# Patient Record
Sex: FEMALE | Race: BLACK OR AFRICAN AMERICAN | Employment: FULL TIME | URBAN - METROPOLITAN AREA
[De-identification: names, ages, dates, MRNs, and addresses within clinical notes are randomized per-mention and may not be internally consistent; named-entity substitution may affect disease eponyms.]

---

## 2017-03-15 NOTE — PATIENT DISCUSSION
(H16.222) Keratoconjunct sicca, not specified as Sjogren's, left eye - Assesment : Examination revealed Dry Eye Syndrome WITH IRRITATION DUE TO SHAMPOO GETTING IN EYE TODAY - Plan : SYSTANE BALANCE OS TID / PRN

## 2017-03-15 NOTE — PATIENT DISCUSSION
(H92.208) Vitreous degeneration, bilateral - Assesment : Examination revealed PVD - Plan : Monitor for changes. Advised patient to call our office with decreased vision or an increase in flashes and/or floaters.

## 2017-03-15 NOTE — PATIENT DISCUSSION
(H35.373) Puckering of macula, bilateral - Assesment : Examination revealed ERM OD>OS. - Plan : Monitor. Advised patient to call our office with decreased vision or increased symptoms.

## 2017-03-15 NOTE — PATIENT DISCUSSION
(H52.856) Regular astigmatism, bilateral - Assesment : Refractive testing reveals astigmatism.  - Plan : OBSERVATION

## 2017-03-15 NOTE — PATIENT DISCUSSION
(H25.13) Age-related nuclear cataract, bilateral - Assesment : Examination revealed cataract. **H/O 8 INCISION RK WITH AK OU. - Plan : Monitor for changes. Advised patient to call our office with decreased vision or increased symptoms.  UPDATE MRX  PATIENT TO CALL IF WISHES TO PROCEED WITH CAT SX BEFORE 1 YEAR FOLLOW UP  1 YEAR EXAM

## 2017-12-01 ENCOUNTER — OFFICE VISIT (OUTPATIENT)
Dept: ONCOLOGY | Age: 32
End: 2017-12-01

## 2017-12-01 ENCOUNTER — HOSPITAL ENCOUNTER (OUTPATIENT)
Dept: ONCOLOGY | Age: 32
Discharge: HOME | End: 2017-12-01

## 2017-12-01 VITALS
DIASTOLIC BLOOD PRESSURE: 62 MMHG | WEIGHT: 144.4 LBS | BODY MASS INDEX: 27.28 KG/M2 | HEART RATE: 59 BPM | SYSTOLIC BLOOD PRESSURE: 100 MMHG | TEMPERATURE: 98.1 F

## 2017-12-01 DIAGNOSIS — D75.1 ERYTHROCYTOSIS: ICD-10-CM

## 2017-12-01 DIAGNOSIS — D72.9 NEUTROPHILIC LEUKOCYTOSIS: ICD-10-CM

## 2017-12-01 DIAGNOSIS — D72.9 NEUTROPHILIC LEUKOCYTOSIS: Primary | ICD-10-CM

## 2017-12-01 LAB
BASO+EOS+MONOS # BLD AUTO: 0.6 K/UL (ref 0–2.3)
BASO+EOS+MONOS # BLD AUTO: 9 % (ref 0.1–17)
DIFFERENTIAL METHOD BLD: ABNORMAL
ERYTHROCYTE [DISTWIDTH] IN BLOOD BY AUTOMATED COUNT: 13.7 % (ref 11.5–14.5)
HCT VFR BLD AUTO: 42 % (ref 36–48)
HGB BLD-MCNC: 12.9 G/DL (ref 12–16)
LYMPHOCYTES # BLD: 2.9 K/UL (ref 1.1–5.9)
LYMPHOCYTES NFR BLD: 43 % (ref 14–44)
MCH RBC QN AUTO: 22.8 PG (ref 25–35)
MCHC RBC AUTO-ENTMCNC: 30.7 G/DL (ref 31–37)
MCV RBC AUTO: 74.2 FL (ref 78–102)
NEUTS SEG # BLD: 3.3 K/UL (ref 1.8–9.5)
NEUTS SEG NFR BLD: 48 % (ref 40–70)
PLATELET # BLD AUTO: 293 K/UL (ref 140–440)
RBC # BLD AUTO: 5.66 M/UL (ref 4.1–5.1)
WBC # BLD AUTO: 6.8 K/UL (ref 4.5–13)

## 2017-12-01 RX ORDER — MELOXICAM 15 MG/1
TABLET ORAL
Refills: 1 | COMMUNITY
Start: 2017-09-18 | End: 2021-02-09

## 2017-12-01 NOTE — PROGRESS NOTES
Hematology/Oncology Consultation Note    Name: Eduardo Christie  Date: 2017  : 1985    PCP: Lonnie Mckeon MD       Ms. Jose George  is a 27 y.o. -American woman who is referred for an evaluation of an elevated RBC count    Subjective:     Chief complaint: Elevated red blood cell count    History of present illness:  Ms. Jose George is a 75-year-old -American woman who states that recently she was seen by her new primary care physician who did blood tests. The CBC revealed that she had an elevated RBC count of 5.68 on 2017. Her WBC count was normal at 8.2 with a hemoglobin of 13 g/dL, hematocrit of 41.5%, and a platelet count of 630,938. On reviewing her clinical record on 10/29/2014 she had an elevated WBC count of 16 and was mildly anemic with a hemoglobin of 11.2, hematocrit of 35.1, and a normal platelet of 775,518. The patient has other complaints of some weakness in her right leg. She is being seen by both neurologist and a rheumatologist for these problems. She is here primarily for assessment of her abnormal RBC count. Past Medical History:   Diagnosis Date    Anemia     Hemorrhoids     Joint pain     Muscle pain        No Known Allergies    Past Surgical History:   Procedure Laterality Date    HX GYN      iud inserted and removed    HX HYSTERECTOMY      HX OTHER SURGICAL  Lap in     HX TUBAL LIGATION         Social History     Social History    Marital status: UNKNOWN     Spouse name: N/A    Number of children: N/A    Years of education: N/A     Occupational History    Not on file.      Social History Main Topics    Smoking status: Never Smoker    Smokeless tobacco: Never Used    Alcohol use No    Drug use: No    Sexual activity: Yes     Partners: Male     Other Topics Concern    Not on file     Social History Narrative       Family History   Problem Relation Age of Onset    Diabetes Mother     Hypertension Mother     Stroke Maternal Grandfather     Breast Cancer Paternal Aunt     Hypertension Father     Depression Father        Current Outpatient Prescriptions   Medication Sig Dispense Refill    meloxicam (MOBIC) 15 mg tablet TAKE 1 TAB BY MOUTH DAILY AS NEEDED FOR PAIN. TAKE WITH FOOD  1    Cholecalciferol, Vitamin D3, (VITAMIN D3) 2,000 unit cap capsule Take  by mouth two (2) times a day.  oxycodone-acetaminophen (PERCOCET) 5-325 mg per tablet Take 1-2 tablets by mouth every six (6) hours as needed for Pain. 40 tablet 0    ibuprofen (MOTRIN) 600 mg tablet Take  by mouth. Indications: PAIN       Review of Systems    General ROS:The patient has no complaints and there is no physical distress evident. Psychological ROS: patient denies having any psychological symptoms such as hallucinations, depression or anxiety. Ophthalmic ROS:the patient denies having any visual impairment or eye discomfort. ENT ROS: there are no abnormalities reported. Allergy and Immunology ROS:the patient denies having any seasonal allergies or allergies to medications other than those already outlined above. Hematological and Lymphatic ROS: the patient denies having any bruising, bleeding or lymphadenopathy. Endocrine ROS: the patient denies having any heat or cold intolerance. There is no history of diabetes or thyroid disorders. Breast ROS: the patient denies having any history of breast mass, nipple discharge, or lumps. Respiratory ROS:the patient denies having any cough, shortness of breath, or dyspnea on exertion. Cardiovascular ROS: there are no complaints of chest pain, palpitations, chest pounding, or dyspnea on exertion. Gastrointestinal ROS: the patient denies having nausea, emesis, diarrhea, constipation, or blood in the stool. Genito-Urinary ROS: the patient denies having urinary urgency, frequency, or dysuria. Musculoskeletal ROS: The patient has some complaints of weakness in the right leg.   Neurological ROS: the patient denies having any numbness, tingling, or neurologic deficits. Dermatological ROS:patient denies having any unexplained rash, skin ulcerations, or hives. Objective:     Visit Vitals    /62 (BP 1 Location: Left arm, BP Patient Position: Sitting)    Pulse (!) 59    Temp 98.1 °F (36.7 °C) (Oral)    Wt 65.5 kg (144 lb 6.4 oz)    LMP 10/06/2014    BMI 27.28 kg/m2        Physical Exam:   Gen. Appearance: the patient is in no acute distress. Skin: There is no evidence of bruise or rash. HEENT: The head is normocephalic and atraumatic. The conjunctiva and sclera are clear. Pupils are equal, round, reactive to light, and accommodation. The extraocular movements are intact. ENT reveals no oral mucosal lesions or ulcerations. Neck: Supple without lymphadenopathy or thyromegaly. Lungs: Clear to auscultation and percussion; there are no wheezes or rhonchi. Heart: Regular rate and rhythm; there are no murmurs, gallops, or rubs. Abdomen: Bowel sounds are present and normal.  There is no guarding, tenderness, or hepatosplenomegaly. Extremities: There is no clubbing, cyanosis, or edema. Neurologic: There are no focal neurologic deficits. Lymphatics: There is no palpable peripheral lymphadenopathy. Lab data: The CBC dated 7/21/2017 showed a WBC count of 8.2, hemoglobin 13 g/dL, hematocrit 41.5%, and her platelet count was 036,122. The CBC dated 10/29/2014 showed a WBC count of 16, hemoglobin 11.2 g/dL, hematocrit 35.1%, and the platelet count was 932,162. Assessment:   Erythrocytosis: Have explained to the patient that she likely has a reactive erythrocytosis versus early evidence of an evolving myeloproliferative disorder primarily affecting erythrocyte production. Neutrophilic leukocytosis: The CBC dated October 29, 2014 showed that she had an unexplained neutrophilic leukocytosis. As of July 2017 her WBC count was normal.      Plan:   Erythrocytosis:  At this time I will order a comprehensive metabolic panel, CBC, and JA K2 mutation analysis to assess for any evidence of a possible evolving myeloproliferative disorder. Neutrophilic leukocytosis: The patient does have an antecedent history of elevated leukocytes on multiple occasions. Flow cytometry will be obtained to rule out any evidence of a slowly evolving immunophenotypic abnormality which could potentially affect leukocyte populations and erythroid populations. Follow-up in 2 weeks to review test results and to discuss options of management. Orders Placed This Encounter    METABOLIC PANEL, COMPREHENSIVE     Standing Status:   Future     Number of Occurrences:   1     Standing Expiration Date:   12/2/2018    IMMUNOPHENOTYPING PROFILE     Standing Status:   Future     Number of Occurrences:   1     Standing Expiration Date:   12/2/2018     Order Specific Question:   Specimen type     Answer:   Blood [2]    JAK2 MUTATION ANALYSIS     Standing Status:   Future     Number of Occurrences:   1     Standing Expiration Date:   12/2/2018    meloxicam (MOBIC) 15 mg tablet     Sig: TAKE 1 TAB BY MOUTH DAILY AS NEEDED FOR PAIN. TAKE WITH FOOD     Refill:  1906 Yaniv Hopkins MD  12/1/2017      Please note: This document has been produced using voice recognition software. Unrecognized errors in transcription may be present.

## 2017-12-01 NOTE — PATIENT INSTRUCTIONS
Complete Blood Count (CBC): About This Test  What is it? A complete blood count (CBC) is a blood test that gives important information about your blood cells, especially red blood cells, white blood cells, and platelets. Why is this test done? A CBC may be done as part of a regular physical exam. There are many other reasons that a doctor may want this blood test, including to:  · Find the cause of symptoms such as fatigue, weakness, fever, bruising, or weight loss. · Find anemia or an infection. · See how much blood has been lost if there is bleeding. · Diagnose diseases of the blood, such as leukemia or polycythemia. How can you prepare for the test?  You do not need to do anything before having this test.  What happens during the test?  The health professional taking a sample of your blood will:  · Wrap an elastic band around your upper arm. This makes the veins below the band larger so it is easier to put a needle into the vein. · Clean the needle site with alcohol. · Put the needle into the vein. · Attach a tube to the needle to fill it with blood. · Remove the band from your arm when enough blood is collected. · Put a gauze pad or cotton ball over the needle site as the needle is removed. · Put pressure on the site and then put on a bandage. If this blood test is done on a baby, a heel stick may be done instead of a blood draw from a vein. What happens after the test?  · You will probably be able to go home right away. · You can go back to your usual activities right away. Follow-up care is a key part of your treatment and safety. Be sure to make and go to all appointments, and call your doctor if you are having problems. It's also a good idea to keep a list of the medicines you take. Ask your doctor when you can expect to have your test results. Where can you learn more? Go to http://maxi-dominic.info/.   Enter K536 in the search box to learn more about \"Complete Blood Count (CBC): About This Test.\"  Current as of: October 14, 2016  Content Version: 11.4  © 8302-2239 Healthwise, Incorporated. Care instructions adapted under license by [employer] (which disclaims liability or warranty for this information). If you have questions about a medical condition or this instruction, always ask your healthcare professional. [place] [address] any warranty or liability for your use of this information.

## 2017-12-01 NOTE — MR AVS SNAPSHOT
Visit Information Date & Time Provider Department Dept. Phone Encounter #  
 12/1/2017  9:30 AM Janie London MD [facility] Office [phone] [id number] Follow-up Instructions Return in about 2 weeks (around 12/15/2017). Your Appointments 12/14/2017 11:45 AM  
Office Visit with Janie London MD  
[facility] ([facility]) Appt Note: 2 wk fu  
 [address] [facility] [address] [address] [phone] [facility] [address] [address] Upcoming Health Maintenance Date Due DTaP/Tdap/Td series (1 - Tdap) 2/12/2004 PAP AKA CERVICAL CYTOLOGY 8/17/2014 Influenza Age 5 to Adult 8/1/2017 Allergies as of 12/1/2017  Review Complete On: 12/1/2017 By: Aj Arguelles  
 No Known Allergies Current Immunizations  Never Reviewed No immunizations on file. Not reviewed this visit You Were Diagnosed With   
  
 Codes Comments Neutrophilic leukocytosis    -  Primary ICD-10-CM: D72.9 ICD-9-CM: 288.8 Erythrocytosis     ICD-10-CM: D75.1 ICD-9-CM: 289. 0 Vitals BP Pulse Temp Weight(growth percentile) LMP BMI  
 100/62 (BP 1 Location: Left arm, BP Patient Position: Sitting) (!) 59 98.1 °F (36.7 °C) (Oral) 144 lb 6.4 oz (65.5 kg) 10/06/2014 27.28 kg/m2 OB Status Smoking Status Hysterectomy Never Smoker BMI and BSA Data Body Mass Index Body Surface Area  
 27.28 kg/m 2 1.68 m 2 Preferred Pharmacy Pharmacy Name Phone [id number], [address] [facility] [phone] Your Updated Medication List  
  
   
This list is accurate as of: 12/1/17 10:23 AM.  Always use your most recent med list.  
  
  
  
  
 Cholecalciferol (Vitamin D3) 2,000 unit Cap capsule Commonly known as:  VITAMIN D3 Take  by mouth two (2) times a day. ibuprofen 600 mg tablet Commonly known as:  MOTRIN  
 Take  by mouth. Indications: PAIN  
  
 meloxicam 15 mg tablet Commonly known as:  MOBIC  
TAKE 1 TAB BY MOUTH DAILY AS NEEDED FOR PAIN. TAKE WITH FOOD  
  
 oxyCODONE-acetaminophen 5-325 mg per tablet Commonly known as:  PERCOCET Take 1-2 tablets by mouth every six (6) hours as needed for Pain. Follow-up Instructions Return in about 2 weeks (around 12/15/2017). To-Do List   
 12/01/2017 Lab:  IMMUNOPHENOTYPING PROFILE   
  
 12/01/2017 Lab:  JAK2 MUTATION ANALYSIS   
  
 12/01/2017 Lab:  METABOLIC PANEL, COMPREHENSIVE Patient Instructions Complete Blood Count (CBC): About This Test 
What is it? A complete blood count (CBC) is a blood test that gives important information about your blood cells, especially red blood cells, white blood cells, and platelets. Why is this test done? A CBC may be done as part of a regular physical exam. There are many other reasons that a doctor may want this blood test, including to: · Find the cause of symptoms such as fatigue, weakness, fever, bruising, or weight loss. · Find anemia or an infection. · See how much blood has been lost if there is bleeding. · Diagnose diseases of the blood, such as leukemia or polycythemia. How can you prepare for the test? 
You do not need to do anything before having this test. 
What happens during the test? 
The health professional taking a sample of your blood will: · Wrap an elastic band around your upper arm. This makes the veins below the band larger so it is easier to put a needle into the vein. · Clean the needle site with alcohol. · Put the needle into the vein. · Attach a tube to the needle to fill it with blood. · Remove the band from your arm when enough blood is collected. · Put a gauze pad or cotton ball over the needle site as the needle is removed. · Put pressure on the site and then put on a bandage. If this blood test is done on a baby, a heel stick may be done instead of a blood draw from a vein. What happens after the test? 
· You will probably be able to go home right away. · You can go back to your usual activities right away. Follow-up care is a key part of your treatment and safety. Be sure to make and go to all appointments, and call your doctor if you are having problems. It's also a good idea to keep a list of the medicines you take. Ask your doctor when you can expect to have your test results. Where can you learn more? Go to http://maxi-dominic.info/. Enter K946 in the search box to learn more about \"Complete Blood Count (CBC): About This Test.\" Current as of: October 14, 2016 Content Version: 11.4 © 4152-9090 Healthwise, Incorporated. Care instructions adapted under license by [employer] (which disclaims liability or warranty for this information). If you have questions about a medical condition or this instruction, always ask your healthcare professional. [address] any warranty or liability for your use of this information. Introducing \[facility]\"" & HEALTH SERVICES! Dear Lillie Figueroa: Thank you for requesting a Dropcam account. Our records indicate that you already have an active Dropcam account. You can access your account anytime at [email or web address] Did you know that you can access your hospital and ER discharge instructions at any time in Dropcam? You can also review all of your test results from your hospital stay or ER visit. Additional Information If you have questions, please visit the Frequently Asked Questions section of the Dropcam website at [email or web address] Remember, Dropcam is NOT to be used for urgent needs. For medical emergencies, dial 911. Now available from your iPhone and Android! Please provide this summary of care documentation to your next provider. Your primary care clinician is listed as Jocelyne Espinal. If you have any questions after today's visit, please call [phone].

## 2017-12-02 LAB
A-G RATIO,AGRAT: 1.6 RATIO (ref 1.1–2.6)
ALBUMIN SERPL-MCNC: 4.5 G/DL (ref 3.5–5)
ALP SERPL-CCNC: 75 U/L (ref 25–115)
ALT SERPL-CCNC: 14 U/L (ref 5–40)
ANION GAP SERPL CALC-SCNC: 14 MMOL/L
AST SERPL W P-5'-P-CCNC: 11 U/L (ref 10–37)
BILIRUB SERPL-MCNC: 0.7 MG/DL (ref 0.2–1.2)
BUN SERPL-MCNC: 8 MG/DL (ref 6–22)
CALCIUM SERPL-MCNC: 9.3 MG/DL (ref 8.4–10.5)
CHLORIDE SERPL-SCNC: 97 MMOL/L (ref 98–110)
CO2 SERPL-SCNC: 23 MMOL/L (ref 20–32)
CREAT SERPL-MCNC: 0.7 MG/DL (ref 0.5–1.2)
GFRAA, 66117: >60
GFRNA, 66118: >60
GLOBULIN,GLOB: 2.9 G/DL (ref 2–4)
GLUCOSE SERPL-MCNC: 100 MG/DL (ref 70–99)
POTASSIUM SERPL-SCNC: 4.2 MMOL/L (ref 3.5–5.5)
PROT SERPL-MCNC: 7.4 G/DL (ref 6.4–8.3)
SODIUM SERPL-SCNC: 134 MMOL/L (ref 133–145)

## 2017-12-05 LAB
CLINICAL INFORMATION, 18161: NORMAL
COMMENT FLOW, 438: NORMAL
FLOW CYTOMETRY REPORT, 67174: NORMAL
FLOW INTERPRETATION: NORMAL
IMMUNOMARKER INTERPRETATION, 441: NORMAL
IMMUNOMARKERS OBTAINED, 440: NORMAL
LDT STATEMENT: NORMAL
MORPHOLOGIC DESCRIPTION: NORMAL

## 2017-12-07 LAB — JAK2 MUTATION: NEGATIVE

## 2017-12-14 ENCOUNTER — OFFICE VISIT (OUTPATIENT)
Dept: ONCOLOGY | Age: 32
End: 2017-12-14

## 2017-12-14 VITALS
BODY MASS INDEX: 28.15 KG/M2 | HEART RATE: 82 BPM | SYSTOLIC BLOOD PRESSURE: 119 MMHG | DIASTOLIC BLOOD PRESSURE: 72 MMHG | WEIGHT: 149 LBS | TEMPERATURE: 98.7 F

## 2017-12-14 DIAGNOSIS — D75.1 ERYTHROCYTOSIS: ICD-10-CM

## 2017-12-14 DIAGNOSIS — D72.9 NEUTROPHILIC LEUKOCYTOSIS: Primary | ICD-10-CM

## 2017-12-14 NOTE — PROGRESS NOTES
Hematology/medical oncology progress note    12/14/2017  Ronda Degroot  YOB: 1985    PCP: Dr. Minoo Coronado    Diagnosis: Reactive erythrocytosis and neutrophilic leukocytosis    Ms. Tho Degroot is a 69-year-old woman who is referred for an evaluation of unexplained erythrocytosis and she was also found to have an elevation of her neutrophilic leukocytes. I have explained to the patient that the [address] [place] [address] mutation analysis for myeloproliferative disorder was negative. Additionally flow cytometry of her peripheral blood revealed no definitive immunophenotypic evidence of a myeloid neoplasia or lymphoproliferative disorder. Therefore the slight elevation in her erythrocytes and neutrophils appeared to be reactive process. There was no evidence of leukemia, lymphoma, malignancy on the flow cytometry. No additional tests are required are recommended. The patient had her questions answered to her satisfaction. I will recheck her cell count again in about 6 months. Total time 25 minutes, greater than 50% of the time was in counseling and coordination of care. Klaus Boo MD, [address]

## 2017-12-14 NOTE — PATIENT INSTRUCTIONS
Complete Blood Count (CBC): About This Test  What is it? A complete blood count (CBC) is a blood test that gives important information about your blood cells, especially red blood cells, white blood cells, and platelets. Why is this test done? A CBC may be done as part of a regular physical exam. There are many other reasons that a doctor may want this blood test, including to:  · Find the cause of symptoms such as fatigue, weakness, fever, bruising, or weight loss. · Find anemia or an infection. · See how much blood has been lost if there is bleeding. · Diagnose diseases of the blood, such as leukemia or polycythemia. How can you prepare for the test?  You do not need to do anything before having this test.  What happens during the test?  The health professional taking a sample of your blood will:  · Wrap an elastic band around your upper arm. This makes the veins below the band larger so it is easier to put a needle into the vein. · Clean the needle site with alcohol. · Put the needle into the vein. · Attach a tube to the needle to fill it with blood. · Remove the band from your arm when enough blood is collected. · Put a gauze pad or cotton ball over the needle site as the needle is removed. · Put pressure on the site and then put on a bandage. If this blood test is done on a baby, a heel stick may be done instead of a blood draw from a vein. What happens after the test?  · You will probably be able to go home right away. · You can go back to your usual activities right away. Follow-up care is a key part of your treatment and safety. Be sure to make and go to all appointments, and call your doctor if you are having problems. It's also a good idea to keep a list of the medicines you take. Ask your doctor when you can expect to have your test results. Where can you learn more? Go to http://maxi-dominic.info/.   Enter E988 in the search box to learn more about \"Complete Blood Count (CBC): About This Test.\"  Current as of: October 14, 2016  Content Version: 11.4  © 3625-0582 Healthwise, Incorporated. Care instructions adapted under license by [employer] (which disclaims liability or warranty for this information). If you have questions about a medical condition or this instruction, always ask your healthcare professional. [place] [address] any warranty or liability for your use of this information.

## 2017-12-14 NOTE — MR AVS SNAPSHOT
Visit Information Date & Time Provider Department Dept. Phone Encounter #  
 12/14/2017 12:15 PM Mariano Snyder MD [facility] Office [phone] [id number] Follow-up Instructions Return in about 6 months (around 6/14/2018). Your Appointments 6/14/2018 10:00 AM  
Office Visit with Mariano Snyder MD  
[facility] [address] [facility]) Appt Note: 6 MO  [facility] [address] [address]  
[address]  
  
   
 640 [facility] [address] [address] Upcoming Health Maintenance Date Due Pneumococcal 19-64 Highest Risk (1 of 3 - PCV13) 2/12/2002 DTaP/Tdap/Td series (1 - Tdap) 2/12/2004 PAP AKA CERVICAL CYTOLOGY 8/17/2014 Influenza Age 5 to Adult 8/1/2017 Allergies as of 12/14/2017  Review Complete On: 12/1/2017 By: Lori Arguelles  
 No Known Allergies Current Immunizations  Never Reviewed No immunizations on file. Not reviewed this visit You Were Diagnosed With   
  
 Codes Comments Neutrophilic leukocytosis    -  Primary ICD-10-CM: D72.9 ICD-9-CM: 288.8 Erythrocytosis     ICD-10-CM: D75.1 ICD-9-CM: 593. 0 Vitals BP Pulse Temp Weight(growth percentile) LMP BMI  
 119/72 82 98.7 °F (37.1 °C) 149 lb (67.6 kg) 10/06/2014 28.15 kg/m2 OB Status Smoking Status Hysterectomy Never Smoker BMI and BSA Data Body Mass Index Body Surface Area  
 28.15 kg/m 2 1.71 m 2 Preferred Pharmacy Pharmacy Name Phone [id number] [place] [place], [address] [facility] [phone] Your Updated Medication List  
  
   
This list is accurate as of: 12/14/17 12:29 PM.  Always use your most recent med list.  
  
  
  
  
 Cholecalciferol (Vitamin D3) 2,000 unit Cap capsule Commonly known as:  VITAMIN D3 Take  by mouth two (2) times a day. ibuprofen 600 mg tablet Commonly known as:  MOTRIN  
 Take  by mouth. Indications: PAIN  
  
 meloxicam 15 mg tablet Commonly known as:  MOBIC  
TAKE 1 TAB BY MOUTH DAILY AS NEEDED FOR PAIN. TAKE WITH FOOD  
  
 oxyCODONE-acetaminophen 5-325 mg per tablet Commonly known as:  PERCOCET Take 1-2 tablets by mouth every six (6) hours as needed for Pain. Follow-up Instructions Return in about 6 months (around 6/14/2018). Patient Instructions Complete Blood Count (CBC): About This Test 
What is it? A complete blood count (CBC) is a blood test that gives important information about your blood cells, especially red blood cells, white blood cells, and platelets. Why is this test done? A CBC may be done as part of a regular physical exam. There are many other reasons that a doctor may want this blood test, including to: · Find the cause of symptoms such as fatigue, weakness, fever, bruising, or weight loss. · Find anemia or an infection. · See how much blood has been lost if there is bleeding. · Diagnose diseases of the blood, such as leukemia or polycythemia. How can you prepare for the test? 
You do not need to do anything before having this test. 
What happens during the test? 
The health professional taking a sample of your blood will: · Wrap an elastic band around your upper arm. This makes the veins below the band larger so it is easier to put a needle into the vein. · Clean the needle site with alcohol. · Put the needle into the vein. · Attach a tube to the needle to fill it with blood. · Remove the band from your arm when enough blood is collected. · Put a gauze pad or cotton ball over the needle site as the needle is removed. · Put pressure on the site and then put on a bandage. If this blood test is done on a baby, a heel stick may be done instead of a blood draw from a vein. What happens after the test? 
· You will probably be able to go home right away. · You can go back to your usual activities right away. Follow-up care is a key part of your treatment and safety. Be sure to make and go to all appointments, and call your doctor if you are having problems. It's also a good idea to keep a list of the medicines you take. Ask your doctor when you can expect to have your test results. Where can you learn more? Go to http://maxi-dominic.info/. Enter U164 in the search box to learn more about \"Complete Blood Count (CBC): About This Test.\" Current as of: October 14, 2016 Content Version: 11.4 © 0583-9849 [employer]. Care instructions adapted under license by [employer] (which disclaims liability or warranty for this information). If you have questions about a medical condition or this instruction, always ask your healthcare professional. [address] any warranty or liability for your use of this information. Introducing [facility] & HE[facility]! Dear Deandre Hernández: Thank you for requesting a [employer] account. Our records indicate that you already have an active [employer] account. You can access your account anytime at [email or web address]„¢[email or web address] Did you know that you can access your hospital and ER discharge instructions at any time in [employer]Link? You can also review all of your test results from your hospital stay or ER visit. Additional Information If you have questions, please visit the Frequently Asked Questions section of the [employer] website at [email or web address] Remember, TraceLink is NOT to be used for urgent needs. For medical emergencies, dial 911. Now available from your iPhone and Android! Please provide this summary of care documentation to your next provider. Your primary care clinician is listed as Jocelyne Espinal. If you have any questions after today's visit, please call [phone].

## 2018-07-25 ENCOUNTER — HOSPITAL ENCOUNTER (OUTPATIENT)
Dept: ONCOLOGY | Age: 33
Discharge: HOME | End: 2018-07-25

## 2018-07-25 ENCOUNTER — OFFICE VISIT (OUTPATIENT)
Dept: ONCOLOGY | Age: 33
End: 2018-07-25

## 2018-07-25 VITALS
RESPIRATION RATE: 16 BRPM | HEART RATE: 70 BPM | SYSTOLIC BLOOD PRESSURE: 113 MMHG | TEMPERATURE: 98.8 F | WEIGHT: 145 LBS | DIASTOLIC BLOOD PRESSURE: 71 MMHG | BODY MASS INDEX: 27.4 KG/M2

## 2018-07-25 DIAGNOSIS — D75.1 ERYTHROCYTOSIS: ICD-10-CM

## 2018-07-25 DIAGNOSIS — D72.9 NEUTROPHILIC LEUKOCYTOSIS: ICD-10-CM

## 2018-07-25 DIAGNOSIS — D72.9 NEUTROPHILIC LEUKOCYTOSIS: Primary | ICD-10-CM

## 2018-07-25 LAB
BASO+EOS+MONOS # BLD AUTO: 0.6 K/UL (ref 0–2.3)
BASO+EOS+MONOS # BLD AUTO: 9 % (ref 0.1–17)
DIFFERENTIAL METHOD BLD: ABNORMAL
ERYTHROCYTE [DISTWIDTH] IN BLOOD BY AUTOMATED COUNT: 13.9 % (ref 11.5–14.5)
HCT VFR BLD AUTO: 41.2 % (ref 36–48)
HGB BLD-MCNC: 12.4 G/DL (ref 12–16)
LYMPHOCYTES # BLD: 2.8 K/UL (ref 1.1–5.9)
LYMPHOCYTES NFR BLD: 38 % (ref 14–44)
MCH RBC QN AUTO: 22.9 PG (ref 25–35)
MCHC RBC AUTO-ENTMCNC: 30.1 G/DL (ref 31–37)
MCV RBC AUTO: 76 FL (ref 78–102)
NEUTS SEG # BLD: 4 K/UL (ref 1.8–9.5)
NEUTS SEG NFR BLD: 53 % (ref 40–70)
PLATELET # BLD AUTO: 312 K/UL (ref 140–440)
RBC # BLD AUTO: 5.42 M/UL (ref 4.1–5.1)
WBC # BLD AUTO: 7.4 K/UL (ref 4.5–13)

## 2018-07-25 NOTE — PATIENT INSTRUCTIONS
Polycythemia: Care Instructions  Your Care Instructions    Polycythemia (say \"paw-olya-sy-THEE-marialuisa-uh) is an abnormal increase in red blood cells. It happens when the tissue inside your bones (bone marrow) makes too much blood. It also can occur if your blood does not have enough liquid, or plasma. This can make the number of red blood cells seem higher than normal. The extra red blood cells make your blood thicker than normal. This may raise your risk for blood clots that can cause heart attacks or strokes. Clots can form in the deep veins of the body, a condition called deep vein thrombosis. Or, a clot can travel through the blood to a lung (a pulmonary embolism). Your doctor may treat you by taking out some of your blood (phlebotomy). The process is like donating blood. Your doctor may even recommend that you donate blood. You may take pills to stop your body from making red blood cells. You also will get treatment for any other conditions that may cause your body to make too many red blood cells. Follow-up care is a key part of your treatment and safety. Be sure to make and go to all appointments, and call your doctor if you are having problems. It's also a good idea to know your test results and keep a list of the medicines you take. How can you care for yourself at home? · Be safe with medicines. Take your medicines exactly as prescribed. Call your doctor if you think you are having a problem with your medicine. · Drink plenty of fluids, enough so that your urine is light yellow or clear like water, before and after you have blood removed. If you have kidney, heart, or liver disease and have to limit fluids, talk with your doctor before you increase the amount of fluids you drink. · Take it easy after you have had blood removed. Do not do vigorous exercise. · If your doctor recommends aspirin, take it exactly as prescribed.  Call your doctor if you think you are having a problem with your medicine. · Do not smoke. Smoking increases the risk of blood clots and may reduce the amount of oxygen in your blood. If you need help quitting, talk to your doctor about stop-smoking programs and medicines. These can increase your chances of quitting for good. · Take an antihistamine, such as a nondrowsy one like loratadine (Claritin) or one that might make you sleepy like diphenhydramine (Benadryl), if your skin is itchy. Some people who have this condition have itching. · Wear medical alert jewelry that lists your clotting problem. You can buy this at most drugstores. When should you call for help? Call 911 anytime you think you may need emergency care. For example, call if:    · You have sudden chest pain and shortness of breath, or you cough up blood.     · You have symptoms of a stroke. These may include:  ¨ Sudden numbness, tingling, weakness, or loss of movement in your face, arm, or leg, especially on only one side of your body. ¨ Sudden vision changes. ¨ Sudden trouble speaking. ¨ Sudden confusion or trouble understanding simple statements. ¨ Sudden problems with walking or balance. ¨ A sudden, severe headache that is different from past headaches.     · You have symptoms of a heart attack. These may include:  ¨ Chest pain or pressure, or a strange feeling in the chest.  ¨ Sweating. ¨ Shortness of breath. ¨ Nausea or vomiting. ¨ Pain, pressure, or a strange feeling in the back, neck, jaw, or upper belly or in one or both shoulders or arms. ¨ Lightheadedness or sudden weakness. ¨ A fast or irregular heartbeat. After you call 911, the  may tell you to chew 1 adult-strength or 2 to 4 low-dose aspirin. Wait for an ambulance. Do not try to drive yourself.    Call your doctor now or seek immediate medical care if:    · You have signs of a blood clot, such as:  ¨ Pain in your calf, back of knee, thigh, or groin.   ¨ Redness and swelling in your leg or groin.    Watch closely for changes in your health, and be sure to contact your doctor if you have any problems. Where can you learn more? Go to http://maxi-dominic.info/. Enter T940 in the search box to learn more about \"Polycythemia: Care Instructions. \"  Current as of: October 9, 2017  Content Version: 11.7  © 0527-2188 [employer]. Care instructions adapted under license by [employer] (which disclaims liability or warranty for this information). If you have questions about a medical condition or this instruction, always ask your healthcare professional. [address] any warranty or liability for your use of this information.

## 2018-07-25 NOTE — MR AVS SNAPSHOT
[facility] 
 
 
 640 [facility] [address] [address] 
[phone] Patient: Bernie Hernandez MRN: [id number] :1985 Visit Information Date & Time Provider Department Dept. Phone Encounter #  
 2018  1:30 PM MD [facility] 115 [phone] Your Appointments 10/18/2018 11:15 AM  
Office Visit with MD [facility] 115 ([facility]) Appt Note: [address]  [facility] [address] [address]  
[phone]  
  
   
 640 [facility] [address] [address] Upcoming Health Maintenance Date Due Pneumococcal 19-64 Highest Risk (1 of 3 - PCV13) 2002 DTaP/Tdap/Td series (1 - Tdap) 2004 PAP AKA CERVICAL CYTOLOGY 2014 Influenza Age 5 to Adult 2018 Allergies as of 2018  Review Complete On: 2018 By: Shruthi Ritchie MD  
 No Known Allergies Current Immunizations  Never Reviewed No immunizations on file. Not reviewed this visit You Were Diagnosed With   
  
 Codes Comments Neutrophilic leukocytosis    -  Primary ICD-10-CM: D72.9 ICD-9-CM: 288.8 Erythrocytosis     ICD-10-CM: D75.1 ICD-9-CM: 927. 0 Vitals BP Pulse Temp Resp Weight(growth percentile) LMP  
 113/71 (BP 1 Location: Left arm, BP Patient Position: Sitting) 70 98.8 °F (37.1 °C) (Oral) 16 145 lb (65.8 kg) 10/06/2014 BMI OB Status Smoking Status 27.4 kg/m2 Hysterectomy Never Smoker BMI and BSA Data Body Mass Index Body Surface Area  
 27.4 kg/m 2 1.68 m 2 Preferred Pharmacy Pharmacy Name Phone [id number] [address], [address] [place] [address] [phone] Your Updated Medication List  
  
   
 This list is accurate as of 7/25/18  2:26 PM.  Always use your most recent med list.  
  
  
  
  
 Cholecalciferol (Vitamin D3) 2,000 unit Cap capsule Commonly known as:  VITAMIN D3 Take  by mouth two (2) times a day. ibuprofen 600 mg tablet Commonly known as:  MOTRIN Take  by mouth. Indications: PAIN  
  
 meloxicam 15 mg tablet Commonly known as:  MOBIC  
TAKE 1 TAB BY MOUTH DAILY AS NEEDED FOR PAIN. TAKE WITH FOOD  
  
 oxyCODONE-acetaminophen 5-325 mg per tablet Commonly known as:  PERCOCET Take 1-2 tablets by mouth every six (6) hours as needed for Pain. We Performed the Following COMPLETE CBC & AUTO DIFF WBC [94015 CPT(R)] METABOLIC PANEL, COMPREHENSIVE [05782 CPT(R)] VITAMIN B12 & FOLATE [71087 CPT(R)] VITAMIN D, 25 HYDROXY [id number] CPT(R)] To-Do List   
 07/25/2018 Lab:  CBC WITH 3 PART DIFF   
  
 07/25/2018 Lab:  VITAMIN B12 & FOLATE   
  
 07/25/2018 Lab:  VITAMIN D, 25 HYDROXY   
  
 07/26/2018 Lab:  METABOLIC PANEL, COMPREHENSIVE Introducing [facility] & HEALTH SERVICES! Dear Iraida Rader: Thank you for requesting a [facility] account. Our records indicate that you already have an active [facility] account. You can access your account anytime at [email or web address] Did you know that you can access your hospital and ER discharge instructions at any time in [facility]? You can also review all of your test results from your hospital stay or ER visit. Additional Information If you have questions, please visit the Frequently Asked Questions section of the [facility] website at [email or web address] Remember, [facility] is NOT to be used for urgent needs. For medical emergencies, dial 911. Now available from your iPhone and Android! Please provide this summary of care documentation to your next provider. Your primary care clinician is listed as Jocelyne Espinal.  If you have any questions after today's visit, please call [phone].

## 2018-07-25 NOTE — PROGRESS NOTES
Hematology/Oncology  Progress Note    Name: Mayra Mccall  Date: 2018  : 1985    Madi Lucas MD     Ms. Javier Manley is a 26y.o. year old female who was seen for erythrocytosis. Subjective:     Mrs. Javier Manley was recently evaluated for elevated RBC's. There was no abnormalities found in the work up. She is here today for a 6 month f/u. Patient reports that she is doing well. She has no complaints to report. Patient states that she takes a oral Vitamin D weekly, but denies the use of any other vitamin supplementation. Patient states that she lives an active lifestyle and follows a well balanced diet. She denies any know history of hematologic disorders. Past medical history, family history, and social history: these were reviewed and remains unchanged. Past Medical History:   Diagnosis Date    Anemia     Hemorrhoids     Joint pain     Muscle pain      Past Surgical History:   Procedure Laterality Date    HX GYN      iud inserted and removed    HX HYSTERECTOMY      HX OTHER SURGICAL  Lap in     HX TUBAL LIGATION       Social History     Social History    Marital status: UNKNOWN     Spouse name: N/A    Number of children: N/A    Years of education: N/A     Occupational History    Not on file. Social History Main Topics    Smoking status: Never Smoker    Smokeless tobacco: Never Used    Alcohol use No    Drug use: No    Sexual activity: Yes     Partners: Male     Other Topics Concern    Not on file     Social History Narrative     Family History   Problem Relation Age of Onset    Diabetes Mother     Hypertension Mother     Stroke Maternal Grandfather     Breast Cancer Paternal Aunt     Hypertension Father     Depression Father      Current Outpatient Prescriptions   Medication Sig Dispense Refill    meloxicam (MOBIC) 15 mg tablet TAKE 1 TAB BY MOUTH DAILY AS NEEDED FOR PAIN.  TAKE WITH FOOD  1    Cholecalciferol, Vitamin D3, (VITAMIN D3) 2,000 unit cap capsule Take  by mouth two (2) times a day.  oxycodone-acetaminophen (PERCOCET) 5-325 mg per tablet Take 1-2 tablets by mouth every six (6) hours as needed for Pain. 40 tablet 0    ibuprofen (MOTRIN) 600 mg tablet Take  by mouth. Indications: PAIN         Review of Systems  Constitutional: The patient has no acute distress or discomfort. HEENT: The patient denies recent head trauma, eye pain, blurred vision,  hearing deficit, oropharyngeal mucosal pain or lesions, and the patient denies throat pain or discomfort. Lymphatics: The patient denies palpable peripheral lymphadenopathy. Hematologic: The patient denies having bruising, bleeding, or progressive fatigue. Respiratory: Patient denies having shortness of breath, cough, sputum production, fever, or dyspnea on exertion. Cardiovascular: The patient denies having leg pain, leg swelling, heart palpitations, chest permit, chest pain, or lightheadedness. The patient denies having dyspnea on exertion. Gastrointestinal: The patient denies having nausea, emesis, or diarrhea. The patient denies having any hematemesis or blood in the stool. Genitourinary: Patient denies having urinary urgency, frequency, or dysuria. The patient denies having blood in the urine. Psychological: The patient denies having symptoms of nervousness, anxiety, depression, or thoughts of harming himself some of this. Skin: Patient denies having skin rashes, skin, ulcerations, or unexplained itching or pruritus. Musculoskeletal: The patient denies having pain in the joints or bones. Objective:     Visit Vitals    /71 (BP 1 Location: Left arm, BP Patient Position: Sitting)    Pulse 70    Temp 98.8 °F (37.1 °C) (Oral)    Resp 16    Wt 65.8 kg (145 lb)    LMP 10/06/2014    BMI 27.4 kg/m2     ECOG PS=0  Physical Exam:   Gen. Appearance: The patient is in no acute distress. Skin: There is no bruise or rash. HEENT: The exam is unremarkable.   Neck: Supple without lymphadenopathy or thyromegaly. Lungs: Clear to auscultation and percussion; there are no wheezes or rhonchi. Heart: Regular rate and rhythm; there are no murmurs, gallops, or rubs. Abdomen: Bowel sounds are present and normal.  There is no guarding, tenderness, or hepatosplenomegaly. Extremities: There is no clubbing, cyanosis, or edema. Neurologic: There are no focal neurologic deficits. Lymphatics: There is no palpable peripheral lymphadenopathy. Musculoskeletal: The patient has full range of motion at all joints. There is no evidence of joint deformity or effusions. There is no focal joint tenderness. Psychological/psychiatric: There is no clinical evidence of anxiety, depression, or melancholy. Lab data:      Results for orders placed or performed during the hospital encounter of 07/25/18   CBC WITH 3 PART DIFF     Status: Abnormal   Result Value Ref Range Status    WBC 7.4 4.5 - 13.0 K/uL Final    RBC 5.42 (H) 4.10 - 5.10 M/uL Final    HGB 12.4 12.0 - 16.0 g/dL Final    HCT 41.2 36 - 48 % Final    MCV 76.0 (L) 78 - 102 FL Final    MCH 22.9 (L) 25.0 - 35.0 PG Final    MCHC 30.1 (L) 31 - 37 g/dL Final    RDW 13.9 11.5 - 14.5 % Final    PLATELET 244 638 - 209 K/uL Final    NEUTROPHILS 53 40 - 70 % Final    MIXED CELLS 9 0.1 - 17 % Final    LYMPHOCYTES 38 14 - 44 % Final    ABS. NEUTROPHILS 4.0 1.8 - 9.5 K/UL Final    ABS. MIXED CELLS 0.6 0.0 - 2.3 K/uL Final    ABS. LYMPHOCYTES 2.8 1.1 - 5.9 K/UL Final     Comment: Test performed at [place] [address] Location. Results Reviewed by Medical Director. DF AUTOMATED   Final           Assessment:     1. Neutrophilic leukocytosis    2. Erythrocytosis          Plan:     Neutrophilic Leukocytosis: CBC from today revealed normal WBC of 7.4. ANC was also normal at 4.0. Total neutrophil population was 53%. We will continue to monitor these values at 3-6 month intervals. A CMP will also be orderd.      Erythrocytosis: Total RBC;s today was 5.42, which showed at slight decrease from 5 months ago which was 5.66. Her H/H was normal at 12.4 and 41.2. Her MCV was declined at 76.0. At this time an CMP, Vitamin D, and B12, and folate will be obtained. The patient was advised to avoid any iron supplements at this time. Return to clinic in 12 weeks or sooner if indicated. Orders Placed This Encounter    COMPLETE CBC & AUTO DIFF WBC    InHouse CBC ([employer])     Standing Status:   Future     Number of Occurrences:   1     Standing Expiration Date:   8/1/2018    VITAMIN B12 & FOLATE     Standing Status:   Future     Number of Occurrences:   1     Standing Expiration Date:   7/26/2019    VITAMIN D, 25 HYDROXY     Standing Status:   Future     Number of Occurrences:   1     Standing Expiration Date:   6/76/7414    METABOLIC PANEL, COMPREHENSIVE     Standing Status:   Future     Number of Occurrences:   1     Standing Expiration Date:   7/26/2019       Jessica Nurse, NP  7/25/2018       I have assessed the patient independently and  agree with the full assessment as outlined. Laurita Price MD, FACP      Please note: This document has been produced using voice recognition software. Unrecognized errors in transcription may be present.

## 2018-07-26 LAB
25(OH)D3 SERPL-MCNC: 24.5 NG/ML (ref 32–100)
A-G RATIO,AGRAT: 1.4 RATIO (ref 1.1–2.6)
ALBUMIN SERPL-MCNC: 4.4 G/DL (ref 3.5–5)
ALP SERPL-CCNC: 82 U/L (ref 25–115)
ALT SERPL-CCNC: 18 U/L (ref 5–40)
ANION GAP SERPL CALC-SCNC: 19 MMOL/L
AST SERPL W P-5'-P-CCNC: 10 U/L (ref 10–37)
BILIRUB SERPL-MCNC: 0.5 MG/DL (ref 0.2–1.2)
BUN SERPL-MCNC: 8 MG/DL (ref 6–22)
CALCIUM SERPL-MCNC: 9.7 MG/DL (ref 8.4–10.5)
CHLORIDE SERPL-SCNC: 97 MMOL/L (ref 98–110)
CO2 SERPL-SCNC: 23 MMOL/L (ref 20–32)
CREAT SERPL-MCNC: 0.7 MG/DL (ref 0.5–1.2)
FOLATE,FOL: 12.73 NG/ML
GFRAA, 66117: >60
GFRNA, 66118: >60
GLOBULIN,GLOB: 3.1 G/DL (ref 2–4)
GLUCOSE SERPL-MCNC: 86 MG/DL (ref 70–99)
POTASSIUM SERPL-SCNC: 4.2 MMOL/L (ref 3.5–5.5)
PROT SERPL-MCNC: 7.5 G/DL (ref 6.4–8.3)
SODIUM SERPL-SCNC: 139 MMOL/L (ref 133–145)
VIT B12 SERPL-MCNC: 415 PG/ML (ref 211–911)

## 2018-07-27 DIAGNOSIS — E55.9 VITAMIN D DEFICIENCY: Primary | ICD-10-CM

## 2018-07-27 RX ORDER — ERGOCALCIFEROL 1.25 MG/1
50000 CAPSULE ORAL
Qty: 12 CAP | Refills: 0 | Status: SHIPPED | OUTPATIENT
Start: 2018-07-27 | End: 2019-03-08 | Stop reason: SDUPTHER

## 2018-10-25 ENCOUNTER — HOSPITAL ENCOUNTER (OUTPATIENT)
Dept: ONCOLOGY | Age: 33
Discharge: HOME | End: 2018-10-25

## 2018-10-25 ENCOUNTER — OFFICE VISIT (OUTPATIENT)
Dept: ONCOLOGY | Age: 33
End: 2018-10-25

## 2018-10-25 VITALS
HEART RATE: 74 BPM | TEMPERATURE: 98.2 F | DIASTOLIC BLOOD PRESSURE: 58 MMHG | BODY MASS INDEX: 27.6 KG/M2 | HEIGHT: 61 IN | WEIGHT: 146.2 LBS | SYSTOLIC BLOOD PRESSURE: 98 MMHG | RESPIRATION RATE: 18 BRPM

## 2018-10-25 DIAGNOSIS — D72.9 NEUTROPHILIC LEUKOCYTOSIS: ICD-10-CM

## 2018-10-25 DIAGNOSIS — D72.9 NEUTROPHILIC LEUKOCYTOSIS: Primary | ICD-10-CM

## 2018-10-25 DIAGNOSIS — D75.1 ERYTHROCYTOSIS: ICD-10-CM

## 2018-10-25 LAB
BASO+EOS+MONOS # BLD AUTO: 0.6 K/UL (ref 0–2.3)
BASO+EOS+MONOS # BLD AUTO: 7 % (ref 0.1–17)
DIFFERENTIAL METHOD BLD: ABNORMAL
ERYTHROCYTE [DISTWIDTH] IN BLOOD BY AUTOMATED COUNT: 14.9 % (ref 11.5–14.5)
HCT VFR BLD AUTO: 41.3 % (ref 36–48)
HGB BLD-MCNC: 13 G/DL (ref 12–16)
LYMPHOCYTES # BLD: 3.6 K/UL (ref 1.1–5.9)
LYMPHOCYTES NFR BLD: 43 % (ref 14–44)
MCH RBC QN AUTO: 23.6 PG (ref 25–35)
MCHC RBC AUTO-ENTMCNC: 31.5 G/DL (ref 31–37)
MCV RBC AUTO: 75 FL (ref 78–102)
NEUTS SEG # BLD: 4.3 K/UL (ref 1.8–9.5)
NEUTS SEG NFR BLD: 51 % (ref 40–70)
PLATELET # BLD AUTO: 314 K/UL (ref 140–440)
RBC # BLD AUTO: 5.51 M/UL (ref 4.1–5.1)
WBC # BLD AUTO: 8.5 K/UL (ref 4.5–13)

## 2018-10-25 NOTE — PATIENT INSTRUCTIONS
Complete Blood Count (CBC): About This Test  What is it? A complete blood count (CBC) is a blood test that gives important information about your blood cells, especially red blood cells, white blood cells, and platelets. Why is this test done? A CBC may be done as part of a regular physical exam. There are many other reasons that a doctor may want this blood test, including to:  · Find the cause of symptoms such as fatigue, weakness, fever, bruising, or weight loss. · Find anemia or an infection. · See how much blood has been lost if there is bleeding. · Diagnose diseases of the blood, such as leukemia or polycythemia. How can you prepare for the test?  You do not need to do anything before having this test.  What happens during the test?  The health professional taking a sample of your blood will:  · Wrap an elastic band around your upper arm. This makes the veins below the band larger so it is easier to put a needle into the vein. · Clean the needle site with alcohol. · Put the needle into the vein. · Attach a tube to the needle to fill it with blood. · Remove the band from your arm when enough blood is collected. · Put a gauze pad or cotton ball over the needle site as the needle is removed. · Put pressure on the site and then put on a bandage. If this blood test is done on a baby, a heel stick may be done instead of a blood draw from a vein. What happens after the test?  · You will probably be able to go home right away. · You can go back to your usual activities right away. Follow-up care is a key part of your treatment and safety. Be sure to make and go to all appointments, and call your doctor if you are having problems. It's also a good idea to keep a list of the medicines you take. Ask your doctor when you can expect to have your test results. Where can you learn more? Go to http://maxi-dominic.info/.   Enter U883 in the search box to learn more about \"Complete Blood Count (CBC): About This Test.\"  Current as of: June 26, 2018  Content Version: 11.8  © 7351-2252 [employer]wise[employer]. Care instructions adapted under license by [employer] (which disclaims liability or warranty for this information). If you have questions about a medical condition or this instruction, always ask your healthcare professional. [address] any warranty or liability for your use of this information.

## 2018-10-25 NOTE — PROGRESS NOTES
Hematology/Oncology  Progress Note    Name: Denise Kaur  Date: 10/25/2018  : 1985    PCP: Myra Aly MD     Ms. Micah Stover is a 26 y.o.  female who was seen for erythrocytosis. Subjective:     Mrs. Micah Stover is a 76-year-old -American woman who was previously evaluated for elevated RBC's. There was no abnormalities found in the work up. She is here today for a 6 month f/u. Patient reports that she is doing well. She has no complaints to report. Patient states that she takes a oral Vitamin D weekly, but denies the use of any other vitamin supplementation. Patient states that she lives an active lifestyle and follows a well balanced diet. She denies any know history of hematologic disorders. Past medical history, family history, and social history: these were reviewed and remains unchanged.     Past Medical History:   Diagnosis Date    Anemia     Hemorrhoids     Joint pain     Muscle pain      Past Surgical History:   Procedure Laterality Date    HX GYN      iud inserted and removed    HX HYSTERECTOMY      HX OTHER SURGICAL  Lap in     HX TUBAL LIGATION       Social History     Socioeconomic History    Marital status: UNKNOWN     Spouse name: Not on file    Number of children: Not on file    Years of education: Not on file    Highest education level: Not on file   Social Needs    Financial resource strain: Not on file    Food insecurity - worry: Not on file    Food insecurity - inability: Not on file   [employer] needs - medical: Not on file   [employer] needs - non-medical: Not on file   Occupational History    Not on file   Tobacco Use    Smoking status: Never Smoker    Smokeless tobacco: Never Used   Substance and Sexual Activity    Alcohol use: No    Drug use: No    Sexual activity: Yes     Partners: Male   Other Topics Concern    Not on file   Social History Narrative    Not on file     Family History   Problem Relation Age of Onset    Diabetes Mother     Hypertension Mother     Stroke Maternal Grandfather     Breast Cancer Paternal Aunt     Hypertension Father     Depression Father      Current Outpatient Medications   Medication Sig Dispense Refill    ergocalciferol (ERGOCALCIFEROL) 50,000 unit capsule Take 1 Cap by mouth every seven (7) days. 12 Cap 0    meloxicam (MOBIC) 15 mg tablet TAKE 1 TAB BY MOUTH DAILY AS NEEDED FOR PAIN. TAKE WITH FOOD  1    Cholecalciferol, Vitamin D3, (VITAMIN D3) 2,000 unit cap capsule Take  by mouth two (2) times a day.  oxycodone-acetaminophen (PERCOCET) 5-325 mg per tablet Take 1-2 tablets by mouth every six (6) hours as needed for Pain. 40 tablet 0    ibuprofen (MOTRIN) 600 mg tablet Take  by mouth. Indications: PAIN         Review of Systems  Constitutional: The patient has no acute distress or discomfort. HEENT: The patient denies recent head trauma, eye pain, blurred vision,  hearing deficit, oropharyngeal mucosal pain or lesions, and the patient denies throat pain or discomfort. Lymphatics: The patient denies palpable peripheral lymphadenopathy. Hematologic: The patient denies having bruising, bleeding, or progressive fatigue. Respiratory: Patient denies having shortness of breath, cough, sputum production, fever, or dyspnea on exertion. Cardiovascular: The patient denies having leg pain, leg swelling, heart palpitations, chest permit, chest pain, or lightheadedness. The patient denies having dyspnea on exertion. Gastrointestinal: The patient denies having nausea, emesis, or diarrhea. The patient denies having any hematemesis or blood in the stool. Genitourinary: Patient denies having urinary urgency, frequency, or dysuria. The patient denies having blood in the urine. Psychological: The patient denies having symptoms of nervousness, anxiety, depression, or thoughts of harming himself some of this.   Skin: Patient denies having skin rashes, skin, ulcerations, or unexplained itching or pruritus. Musculoskeletal: The patient denies having pain in the joints or bones. Objective:     Visit Vitals  BP 98/58 (BP 1 Location: Left arm, BP Patient Position: Sitting)   Pulse 74   Temp 98.2 °F (36.8 °C) (Oral)   Resp 18   Ht 5' 1\" (1.549 m)   Wt 66.3 kg (146 lb 3.2 oz)   LMP 10/06/2014   BMI 27.62 kg/m²     ECOG PS=0  Physical Exam:   Gen. Appearance: The patient is in no acute distress. Skin: There is no bruise or rash. HEENT: The exam is unremarkable. Neck: Supple without lymphadenopathy or thyromegaly. Lungs: Clear to auscultation and percussion; there are no wheezes or rhonchi. Heart: Regular rate and rhythm; there are no murmurs, gallops, or rubs. Abdomen: Bowel sounds are present and normal.  There is no guarding, tenderness, or hepatosplenomegaly. Extremities: There is no clubbing, cyanosis, or edema. Neurologic: There are no focal neurologic deficits. Lymphatics: There is no palpable peripheral lymphadenopathy. Musculoskeletal: The patient has full range of motion at all joints. There is no evidence of joint deformity or effusions. There is no focal joint tenderness. Psychological/psychiatric: There is no clinical evidence of anxiety, depression, or melancholy. Lab data:      Results for orders placed or performed during the hospital encounter of 10/25/18   CBC WITH 3 PART DIFF     Status: Abnormal   Result Value Ref Range Status    WBC 8.5 4.5 - 13.0 K/uL Final    RBC 5.51 (H) 4.10 - 5.10 M/uL Final    HGB 13.0 12.0 - 16.0 g/dL Final    HCT 41.3 36 - 48 % Final    MCV 75.0 (L) 78 - 102 FL Final    MCH 23.6 (L) 25.0 - 35.0 PG Final    MCHC 31.5 31 - 37 g/dL Final    RDW 14.9 (H) 11.5 - 14.5 % Final    PLATELET 276 587 - 380 K/uL Final    NEUTROPHILS 51 40 - 70 % Final    MIXED CELLS 7 0.1 - 17 % Final    LYMPHOCYTES 43 14 - 44 % Final    ABS. NEUTROPHILS 4.3 1.8 - 9.5 K/UL Final    ABS. MIXED CELLS 0.6 0.0 - 2.3 K/uL Final    ABS.  LYMPHOCYTES 3.6 1.1 - 5.9 K/UL Final Comment: Test performed at [place] [address] Location. Results Reviewed by Medical Director. DF AUTOMATED   Final           Assessment:     1. Neutrophilic leukocytosis    2. Erythrocytosis          Plan:     Neutrophilic Leukocytosis: CBC from today revealed normal WBC of 8.5, the absolute neutrophil count is 4.3 and the absolute lymphocyte count is 3.6. Neutrophils represent 51% of the total WBC count. We will continue to monitor these values at 3-6 month intervals. A CMP will also be orderd. Erythrocytosis: Total RBC;s today was 5.51, which showed at slight decrease from 5 months ago which was 5.66. Her H/H was normal at 13 g/dL and 41.3% respectively. Her MCV was declined at 75 .0. At this time an CMP, Vitamin D, and B12, and folate will be obtained. The patient was advised to avoid any iron supplements at this time. Return to clinic in 12 weeks or sooner if indicated. Orders Placed This Encounter    COMPLETE CBC & AUTO DIFF WBC    InHouse CBC ([employer])     Standing Status:   Future     Number of Occurrences:   1     Standing Expiration Date:   11/1/2018       Saurabh Coleman MD  10/25/2018       I have assessed the patient independently and  agree with the full assessment as outlined. Violeta Xiong MD, FACP      Please note: This document has been produced using voice recognition software. Unrecognized errors in transcription may be present.

## 2018-10-26 LAB
A-G RATIO,AGRAT: 1.4 RATIO (ref 1.1–2.6)
ALBUMIN SERPL-MCNC: 4.5 G/DL (ref 3.5–5)
ALP SERPL-CCNC: 79 U/L (ref 25–115)
ALT SERPL-CCNC: 15 U/L (ref 5–40)
ANION GAP SERPL CALC-SCNC: 21 MMOL/L
AST SERPL W P-5'-P-CCNC: 13 U/L (ref 10–37)
BILIRUB SERPL-MCNC: 0.7 MG/DL (ref 0.2–1.2)
BUN SERPL-MCNC: 8 MG/DL (ref 6–22)
CALCIUM SERPL-MCNC: 9.3 MG/DL (ref 8.4–10.5)
CHLORIDE SERPL-SCNC: 100 MMOL/L (ref 98–110)
CO2 SERPL-SCNC: 20 MMOL/L (ref 20–32)
CREAT SERPL-MCNC: 0.6 MG/DL (ref 0.5–1.2)
GFRAA, 66117: >60
GFRNA, 66118: >60
GLOBULIN,GLOB: 3.3 G/DL (ref 2–4)
GLUCOSE SERPL-MCNC: 124 MG/DL (ref 70–99)
POTASSIUM SERPL-SCNC: 4 MMOL/L (ref 3.5–5.5)
PROT SERPL-MCNC: 7.8 G/DL (ref 6.4–8.3)
SODIUM SERPL-SCNC: 141 MMOL/L (ref 133–145)

## 2018-12-12 ENCOUNTER — OFFICE VISIT (OUTPATIENT)
Dept: ONCOLOGY | Age: 33
End: 2018-12-12

## 2018-12-12 ENCOUNTER — HOSPITAL ENCOUNTER (OUTPATIENT)
Dept: ONCOLOGY | Age: 33
Discharge: HOME | End: 2018-12-12

## 2018-12-12 VITALS
TEMPERATURE: 97.6 F | SYSTOLIC BLOOD PRESSURE: 119 MMHG | RESPIRATION RATE: 16 BRPM | HEART RATE: 76 BPM | BODY MASS INDEX: 27.78 KG/M2 | WEIGHT: 147 LBS | DIASTOLIC BLOOD PRESSURE: 70 MMHG

## 2018-12-12 DIAGNOSIS — D75.1 ERYTHROCYTOSIS: ICD-10-CM

## 2018-12-12 DIAGNOSIS — D72.9 NEUTROPHILIC LEUKOCYTOSIS: ICD-10-CM

## 2018-12-12 DIAGNOSIS — D72.9 NEUTROPHILIC LEUKOCYTOSIS: Primary | ICD-10-CM

## 2018-12-12 LAB
BASO+EOS+MONOS # BLD AUTO: 0.6 K/UL (ref 0–2.3)
BASO+EOS+MONOS # BLD AUTO: 7 % (ref 0.1–17)
DIFFERENTIAL METHOD BLD: ABNORMAL
ERYTHROCYTE [DISTWIDTH] IN BLOOD BY AUTOMATED COUNT: 14.2 % (ref 11.5–14.5)
HCT VFR BLD AUTO: 40.8 % (ref 36–48)
HGB BLD-MCNC: 12.5 G/DL (ref 12–16)
LYMPHOCYTES # BLD: 3.8 K/UL (ref 1.1–5.9)
LYMPHOCYTES NFR BLD: 45 % (ref 14–44)
MCH RBC QN AUTO: 23.1 PG (ref 25–35)
MCHC RBC AUTO-ENTMCNC: 30.6 G/DL (ref 31–37)
MCV RBC AUTO: 75.3 FL (ref 78–102)
NEUTS SEG # BLD: 3.9 K/UL (ref 1.8–9.5)
NEUTS SEG NFR BLD: 48 % (ref 40–70)
PLATELET # BLD AUTO: 333 K/UL (ref 140–440)
RBC # BLD AUTO: 5.42 M/UL (ref 4.1–5.1)
WBC # BLD AUTO: 8.3 K/UL (ref 4.5–13)

## 2018-12-12 NOTE — PROGRESS NOTES
Hematology/Oncology  Progress Note    Name: Dinesh Lees  Date: 2018  : 1985    PCP: Angeline Bowen MD     Ms. Cintia Almanzar is a 26 y.o.  female who was seen for erythrocytosis. Current therapy: Active surveillance; therapeutic phlebotomy will be provided if the hematocrit exceeds 45%. Subjective:     Mrs. Cintia Almanzar is a 68-year-old -American woman who was previously evaluated for elevated RBC's. There was no abnormalities found in the work up. She is here today for a 6 month f/u. Patient reports that she is doing well. She has no complaints to report. Patient states that she takes a oral Vitamin D weekly, but denies the use of any other vitamin supplementation. Patient states that she lives an active lifestyle and follows a well balanced diet. She denies any know history of hematologic disorders. Past medical history, family history, and social history: these were reviewed and remains unchanged.     Past Medical History:   Diagnosis Date    Anemia     Hemorrhoids     Joint pain     Muscle pain      Past Surgical History:   Procedure Laterality Date    HX GYN      iud inserted and removed    HX HYSTERECTOMY      HX OTHER SURGICAL  Lap in     HX TUBAL LIGATION       Social History     Socioeconomic History    Marital status: UNKNOWN     Spouse name: Not on file    Number of children: Not on file    Years of education: Not on file    Highest education level: Not on file   Social Needs    Financial resource strain: Not on file    Food insecurity - worry: Not on file    Food insecurity - inability: Not on file   [employer] needs - medical: Not on file   [employer] needs - non-medical: Not on file   Occupational History    Not on file   Tobacco Use    Smoking status: Never Smoker    Smokeless tobacco: Never Used   Substance and Sexual Activity    Alcohol use: No    Drug use: No    Sexual activity: Yes     Partners: Male   Other Topics Concern    Not on file   Social History Narrative    Not on file     Family History   Problem Relation Age of Onset    Diabetes Mother     Hypertension Mother     Stroke Maternal Grandfather     Breast Cancer Paternal Aunt     Hypertension Father     Depression Father      Current Outpatient Medications   Medication Sig Dispense Refill    ergocalciferol (ERGOCALCIFEROL) 50,000 unit capsule Take 1 Cap by mouth every seven (7) days. 12 Cap 0    meloxicam (MOBIC) 15 mg tablet TAKE 1 TAB BY MOUTH DAILY AS NEEDED FOR PAIN. TAKE WITH FOOD  1    Cholecalciferol, Vitamin D3, (VITAMIN D3) 2,000 unit cap capsule Take  by mouth two (2) times a day.  oxycodone-acetaminophen (PERCOCET) 5-325 mg per tablet Take 1-2 tablets by mouth every six (6) hours as needed for Pain. 40 tablet 0    ibuprofen (MOTRIN) 600 mg tablet Take  by mouth. Indications: PAIN         Review of Systems  Constitutional: The patient has no acute distress or discomfort. HEENT: The patient denies recent head trauma, eye pain, blurred vision,  hearing deficit, oropharyngeal mucosal pain or lesions, and the patient denies throat pain or discomfort. Lymphatics: The patient denies palpable peripheral lymphadenopathy. Hematologic: The patient denies having bruising, bleeding, or progressive fatigue. Respiratory: Patient denies having shortness of breath, cough, sputum production, fever, or dyspnea on exertion. Cardiovascular: The patient denies having leg pain, leg swelling, heart palpitations, chest permit, chest pain, or lightheadedness. The patient denies having dyspnea on exertion. Gastrointestinal: The patient denies having nausea, emesis, or diarrhea. The patient denies having any hematemesis or blood in the stool. Genitourinary: Patient denies having urinary urgency, frequency, or dysuria. The patient denies having blood in the urine.   Psychological: The patient denies having symptoms of nervousness, anxiety, depression, or thoughts of harming himself some of this. Skin: Patient denies having skin rashes, skin, ulcerations, or unexplained itching or pruritus. Musculoskeletal: The patient denies having pain in the joints or bones. Objective:     Visit Vitals  /70   Pulse 76   Temp 97.6 °F (36.4 °C) (Oral)   Resp 16   Wt 66.7 kg (147 lb)   LMP 10/06/2014   BMI 27.78 kg/m²     ECOG PS=0  Physical Exam:   Gen. Appearance: The patient is in no acute distress. Skin: There is no bruise or rash. HEENT: The exam is unremarkable. Neck: Supple without lymphadenopathy or thyromegaly. Lungs: Clear to auscultation and percussion; there are no wheezes or rhonchi. Heart: Regular rate and rhythm; there are no murmurs, gallops, or rubs. Abdomen: Bowel sounds are present and normal.  There is no guarding, tenderness, or hepatosplenomegaly. Extremities: There is no clubbing, cyanosis, or edema. Neurologic: There are no focal neurologic deficits. Lymphatics: There is no palpable peripheral lymphadenopathy. Musculoskeletal: The patient has full range of motion at all joints. There is no evidence of joint deformity or effusions. There is no focal joint tenderness. Psychological/psychiatric: There is no clinical evidence of anxiety, depression, or melancholy. Lab data:      Results for orders placed or performed during the hospital encounter of 12/12/18   CBC WITH 3 PART DIFF     Status: Abnormal   Result Value Ref Range Status    WBC 8.3 4.5 - 13.0 K/uL Final    RBC 5.42 (H) 4.10 - 5.10 M/uL Final    HGB 12.5 12.0 - 16.0 g/dL Final    HCT 40.8 36 - 48 % Final    MCV 75.3 (L) 78 - 102 FL Final    MCH 23.1 (L) 25.0 - 35.0 PG Final    MCHC 30.6 (L) 31 - 37 g/dL Final    RDW 14.2 11.5 - 14.5 % Final    PLATELET 775 451 - 025 K/uL Final    NEUTROPHILS 48 40 - 70 % Final    MIXED CELLS 7 0.1 - 17 % Final    LYMPHOCYTES 45 (H) 14 - 44 % Final    ABS. NEUTROPHILS 3.9 1.8 - 9.5 K/UL Final    ABS. MIXED CELLS 0.6 0.0 - 2.3 K/uL Final    ABS.  LYMPHOCYTES 3.8 1.1 - 5.9 K/UL Final     Comment: Test performed at [place] [address] Location. Results Reviewed by Medical Director. DF AUTOMATED   Final           Assessment:     1. Neutrophilic leukocytosis    2. Erythrocytosis          Plan:     Neutrophilic Leukocytosis: CBC from today revealed normal WBC of 8.3, the absolute neutrophil count is 3.9 with an absolute lymphocyte count of 3.8. The lymphocytes represent 45% of the total WBC count in the neutrophils of 48% of the total WBC count. We will continue to monitor these values at 3-6 month intervals. A CMP will also be orderd. Erythrocytosis: Total RBC;s today was 5.42, which showed at slight decrease from 5 months ago which was 5.66. Her hemoglobin and hematocrit are normal at 12.5 g/dL and 40.8% respectively. Her MCV was declined at 75 .0. At this time an CMP, Vitamin D, and B12, and folate will be obtained. The patient was advised to avoid any iron supplements at this time. Return to clinic in 12 weeks or sooner if indicated. Orders Placed This Encounter    COMPLETE CBC & AUTO DIFF WBC    InHouse CBC ([employer])     Standing Status:   Future     Number of Occurrences:   1     Standing Expiration Date:   85/56/0851    METABOLIC PANEL, COMPREHENSIVE     Standing Status:   Future     Number of Occurrences:   1     Standing Expiration Date:   12/13/2019    IRON PROFILE     Standing Status:   Future     Number of Occurrences:   1     Standing Expiration Date:   12/13/2019    FERRITIN     Standing Status:   Future     Number of Occurrences:   1     Standing Expiration Date:   12/13/2019       Reinier Isidro MD  12/12/2018       I have assessed the patient independently and  agree with the full assessment as outlined. Henrik Dugan MD, FACP      Please note: This document has been produced using voice recognition software. Unrecognized errors in transcription may be present.

## 2018-12-12 NOTE — PATIENT INSTRUCTIONS
Polycythemia: Care Instructions  Your Care Instructions    Polycythemia (say \"paw-olya-sy-THEE-marialuisa-uh) is an abnormal increase in red blood cells. It happens when the tissue inside your bones (bone marrow) makes too much blood. It also can occur if your blood does not have enough liquid, or plasma. This can make the number of red blood cells seem higher than normal. The extra red blood cells make your blood thicker than normal. This may raise your risk for blood clots that can cause heart attacks or strokes. Clots can form in the deep veins of the body, a condition called deep vein thrombosis. Or, a clot can travel through the blood to a lung (a pulmonary embolism). Your doctor may treat you by taking out some of your blood (phlebotomy). The process is like donating blood. Your doctor may even recommend that you donate blood. You may take pills to stop your body from making red blood cells. You also will get treatment for any other conditions that may cause your body to make too many red blood cells. Follow-up care is a key part of your treatment and safety. Be sure to make and go to all appointments, and call your doctor if you are having problems. It's also a good idea to know your test results and keep a list of the medicines you take. How can you care for yourself at home? · Be safe with medicines. Take your medicines exactly as prescribed. Call your doctor if you think you are having a problem with your medicine. · Drink plenty of fluids, enough so that your urine is light yellow or clear like water, before and after you have blood removed. If you have kidney, heart, or liver disease and have to limit fluids, talk with your doctor before you increase the amount of fluids you drink. · Take it easy after you have had blood removed. Do not do vigorous exercise. · If your doctor recommends aspirin, take it exactly as prescribed.  Call your doctor if you think you are having a problem with your medicine. · Do not smoke. Smoking increases the risk of blood clots and may reduce the amount of oxygen in your blood. If you need help quitting, talk to your doctor about stop-smoking programs and medicines. These can increase your chances of quitting for good. · Take an antihistamine, such as a nondrowsy one like loratadine (Claritin) or one that might make you sleepy like diphenhydramine (Benadryl), if your skin is itchy. Some people who have this condition have itching. · Wear medical alert jewelry that lists your clotting problem. You can buy this at most [employer]. When should you call for help? Call 911 anytime you think you may need emergency care. For example, call if:    · You have sudden chest pain and shortness of breath, or you cough up blood.     · You have symptoms of a stroke. These may include:  ? Sudden numbness, tingling, weakness, or loss of movement in your face, arm, or leg, especially on only one side of your body. ? Sudden vision changes. ? Sudden trouble speaking. ? Sudden confusion or trouble understanding simple statements. ? Sudden problems with walking or balance. ? A sudden, severe headache that is different from past headaches.     · You have symptoms of a heart attack. These may include:  ? Chest pain or pressure, or a strange feeling in the chest.  ? Sweating. ? Shortness of breath. ? Nausea or vomiting. ? Pain, pressure, or a strange feeling in the back, neck, jaw, or upper belly or in one or both shoulders or arms. ? Lightheadedness or sudden weakness. ? A fast or irregular heartbeat. After you call 911, the  may tell you to chew 1 adult-strength or 2 to 4 low-dose aspirin. Wait for an ambulance. Do not try to drive yourself.    Call your doctor now or seek immediate medical care if:    · You have signs of a blood clot, such as:  ? Pain in your calf, back of knee, thigh, or groin. ?  Redness and swelling in your leg or groin.    Watch closely for changes in your health, and be sure to contact your doctor if you have any problems. Where can you learn more? Go to http://maxi-dominic.info/. Enter Z331 in the search box to learn more about \"Polycythemia: Care Instructions. \"  Current as of: May 7, 2018  Content Version: 11.8  © 7064-1824 [employer]. Care instructions adapted under license by [employer] (which disclaims liability or warranty for this information). If you have questions about a medical condition or this instruction, always ask your healthcare professional. [address] any warranty or liability for your use of this information.

## 2018-12-13 LAB
A-G RATIO,AGRAT: 1.3 RATIO (ref 1.1–2.6)
ALBUMIN SERPL-MCNC: 4.4 G/DL (ref 3.5–5)
ALP SERPL-CCNC: 85 U/L (ref 25–115)
ALT SERPL-CCNC: 11 U/L (ref 5–40)
ANION GAP SERPL CALC-SCNC: 15 MMOL/L
AST SERPL W P-5'-P-CCNC: 10 U/L (ref 10–37)
BILIRUB SERPL-MCNC: 0.4 MG/DL (ref 0.2–1.2)
BUN SERPL-MCNC: 8 MG/DL (ref 6–22)
CALCIUM SERPL-MCNC: 9.6 MG/DL (ref 8.4–10.5)
CHLORIDE SERPL-SCNC: 99 MMOL/L (ref 98–110)
CO2 SERPL-SCNC: 25 MMOL/L (ref 20–32)
CREAT SERPL-MCNC: 0.7 MG/DL (ref 0.5–1.2)
FE % SATURATION,PSAT: 22 % (ref 20–50)
FERRITIN SERPL-MCNC: 176 NG/ML (ref 10–291)
GFRAA, 66117: >60
GFRNA, 66118: >60
GLOBULIN,GLOB: 3.4 G/DL (ref 2–4)
GLUCOSE SERPL-MCNC: 126 MG/DL (ref 70–99)
IRON,IRN: 69 MCG/DL (ref 30–160)
POTASSIUM SERPL-SCNC: 4.1 MMOL/L (ref 3.5–5.5)
PROT SERPL-MCNC: 7.8 G/DL (ref 6.4–8.3)
SODIUM SERPL-SCNC: 139 MMOL/L (ref 133–145)
TIBC,TIBC: 315 MCG/DL (ref 228–428)
UIBC SERPL-MCNC: 246 MCG/DL (ref 110–370)

## 2019-03-06 ENCOUNTER — OFFICE VISIT (OUTPATIENT)
Dept: ONCOLOGY | Age: 34
End: 2019-03-06

## 2019-03-06 ENCOUNTER — HOSPITAL ENCOUNTER (OUTPATIENT)
Dept: ONCOLOGY | Age: 34
Discharge: HOME | End: 2019-03-06

## 2019-03-06 VITALS
TEMPERATURE: 97.7 F | HEART RATE: 63 BPM | OXYGEN SATURATION: 100 % | SYSTOLIC BLOOD PRESSURE: 119 MMHG | WEIGHT: 141 LBS | BODY MASS INDEX: 26.62 KG/M2 | RESPIRATION RATE: 16 BRPM | DIASTOLIC BLOOD PRESSURE: 80 MMHG | HEIGHT: 61 IN

## 2019-03-06 DIAGNOSIS — D72.9 NEUTROPHILIC LEUKOCYTOSIS: ICD-10-CM

## 2019-03-06 DIAGNOSIS — D75.1 ERYTHROCYTOSIS: ICD-10-CM

## 2019-03-06 DIAGNOSIS — D72.9 NEUTROPHILIC LEUKOCYTOSIS: Primary | ICD-10-CM

## 2019-03-06 LAB
BASO+EOS+MONOS # BLD AUTO: 0.8 K/UL (ref 0–2.3)
BASO+EOS+MONOS NFR BLD AUTO: 9 % (ref 0.1–17)
DIFFERENTIAL METHOD BLD: ABNORMAL
ERYTHROCYTE [DISTWIDTH] IN BLOOD BY AUTOMATED COUNT: 14.2 % (ref 11.5–14.5)
HCT VFR BLD AUTO: 40.6 % (ref 36–48)
HGB BLD-MCNC: 12.8 G/DL (ref 12–16)
LYMPHOCYTES # BLD: 3.2 K/UL (ref 1.1–5.9)
LYMPHOCYTES NFR BLD: 35 % (ref 14–44)
MCH RBC QN AUTO: 24 PG (ref 25–35)
MCHC RBC AUTO-ENTMCNC: 31.5 G/DL (ref 31–37)
MCV RBC AUTO: 76.2 FL (ref 78–102)
NEUTS SEG # BLD: 5.2 K/UL (ref 1.8–9.5)
NEUTS SEG NFR BLD: 56 % (ref 40–70)
PLATELET # BLD AUTO: 329 K/UL (ref 140–440)
RBC # BLD AUTO: 5.33 M/UL (ref 4.1–5.1)
WBC # BLD AUTO: 9.2 K/UL (ref 4.5–13)

## 2019-03-06 NOTE — PATIENT INSTRUCTIONS
Polycythemia: Care Instructions  Your Care Instructions    Polycythemia (say \"paw-olya-sy-THEE-marialuisa-uh) is an abnormal increase in red blood cells. It happens when the tissue inside your bones (bone marrow) makes too much blood. It also can occur if your blood does not have enough liquid, or plasma. This can make the number of red blood cells seem higher than normal. The extra red blood cells make your blood thicker than normal. This may raise your risk for blood clots that can cause heart attacks or strokes. Clots can form in the deep veins of the body, a condition called deep vein thrombosis. Or, a clot can travel through the blood to a lung (a pulmonary embolism). Your doctor may treat you by taking out some of your blood (phlebotomy). The process is like donating blood. Your doctor may even recommend that you donate blood. You may take pills to stop your body from making red blood cells. You also will get treatment for any other conditions that may cause your body to make too many red blood cells. Follow-up care is a key part of your treatment and safety. Be sure to make and go to all appointments, and call your doctor if you are having problems. It's also a good idea to know your test results and keep a list of the medicines you take. How can you care for yourself at home? · Be safe with medicines. Take your medicines exactly as prescribed. Call your doctor if you think you are having a problem with your medicine. · Drink plenty of fluids, enough so that your urine is light yellow or clear like water, before and after you have blood removed. If you have kidney, heart, or liver disease and have to limit fluids, talk with your doctor before you increase the amount of fluids you drink. · Take it easy after you have had blood removed. Do not do vigorous exercise. · If your doctor recommends aspirin, take it exactly as prescribed.  Call your doctor if you think you are having a problem with your medicine. · Do not smoke. Smoking increases the risk of blood clots and may reduce the amount of oxygen in your blood. If you need help quitting, talk to your doctor about stop-smoking programs and medicines. These can increase your chances of quitting for good. · Take an antihistamine, such as a nondrowsy one like loratadine (Claritin) or one that might make you sleepy like diphenhydramine (Benadryl), if your skin is itchy. Some people who have this condition have itching. · Wear medical alert jewelry that lists your clotting problem. You can buy this at most [employer]. When should you call for help? Call 911 anytime you think you may need emergency care. For example, call if:    · You have sudden chest pain and shortness of breath, or you cough up blood.     · You have symptoms of a stroke. These may include:  ? Sudden numbness, tingling, weakness, or loss of movement in your face, arm, or leg, especially on only one side of your body. ? Sudden vision changes. ? Sudden trouble speaking. ? Sudden confusion or trouble understanding simple statements. ? Sudden problems with walking or balance. ? A sudden, severe headache that is different from past headaches.     · You have symptoms of a heart attack. These may include:  ? Chest pain or pressure, or a strange feeling in the chest.  ? Sweating. ? Shortness of breath. ? Nausea or vomiting. ? Pain, pressure, or a strange feeling in the back, neck, jaw, or upper belly or in one or both shoulders or arms. ? Lightheadedness or sudden weakness. ? A fast or irregular heartbeat. After you call 911, the  may tell you to chew 1 adult-strength or 2 to 4 low-dose aspirin. Wait for an ambulance. Do not try to drive yourself.    Call your doctor now or seek immediate medical care if:    · You have signs of a blood clot, such as:  ? Pain in your calf, back of knee, thigh, or groin. ?  Redness and swelling in your leg or groin.    Watch closely for changes in your health, and be sure to contact your doctor if you have any problems. Where can you learn more? Go to http://maxi-dominic.info/. Enter M404 in the search box to learn more about \"Polycythemia: Care Instructions. \"  Current as of: May 6, 2018  Content Version: 11.9  © 4589-8676 [employer], [employer]. Care instructions adapted under license by [employer] (which disclaims liability or warranty for this information). If you have questions about a medical condition or this instruction, always ask your healthcare professional. [address] any warranty or liability for your use of this information.

## 2019-03-06 NOTE — PROGRESS NOTES
Hematology/Oncology  Progress Note    Name: Francisco Mathew  Date: 3/6/2019  : 1985    PCP: Elizabeth Washington MD     Ms. Susan Dewey is a 37 y.o.  female who was seen for erythrocytosis. Current therapy: Active surveillance; therapeutic phlebotomy will be provided if the hematocrit exceeds 45%. Subjective:     Mrs. Susan Dewey is a 38-year-old -American woman who was previously evaluated for elevated RBC's. There was no abnormalities found in the work up. She is here today for a 6 month f/u. Patient reports that she is doing well. She has no complaints to report. Patient states that she takes a oral Vitamin D weekly, but denies the use of any other vitamin supplementation. Patient states that she lives an active lifestyle and follows a well balanced diet. She denies any know history of hematologic disorders. Past medical history, family history, and social history: these were reviewed and remains unchanged.     Past Medical History:   Diagnosis Date    Anemia     Hemorrhoids     Joint pain     Muscle pain      Past Surgical History:   Procedure Laterality Date    HX GYN      iud inserted and removed    HX HYSTERECTOMY      HX OTHER SURGICAL  Lap in     HX TUBAL LIGATION       Social History     Socioeconomic History    Marital status: UNKNOWN     Spouse name: Not on file    Number of children: Not on file    Years of education: Not on file    Highest education level: Not on file   Social Needs    Financial resource strain: Not on file    Food insecurity - worry: Not on file    Food insecurity - inability: Not on file   [employer] needs - medical: Not on file   [employer] needs - non-medical: Not on file   Occupational History    Not on file   Tobacco Use    Smoking status: Never Smoker    Smokeless tobacco: Never Used   Substance and Sexual Activity    Alcohol use: No    Drug use: No    Sexual activity: Yes     Partners: Male   Other Topics Concern    Not on file   Social History Narrative    Not on file     Family History   Problem Relation Age of Onset    Diabetes Mother     Hypertension Mother     Stroke Maternal Grandfather     Breast Cancer Paternal Aunt     Hypertension Father     Depression Father      Current Outpatient Medications   Medication Sig Dispense Refill    ergocalciferol (ERGOCALCIFEROL) 50,000 unit capsule Take 1 Cap by mouth every seven (7) days. 12 Cap 0    meloxicam (MOBIC) 15 mg tablet TAKE 1 TAB BY MOUTH DAILY AS NEEDED FOR PAIN. TAKE WITH FOOD  1    Cholecalciferol, Vitamin D3, (VITAMIN D3) 2,000 unit cap capsule Take  by mouth two (2) times a day.  oxycodone-acetaminophen (PERCOCET) 5-325 mg per tablet Take 1-2 tablets by mouth every six (6) hours as needed for Pain. 40 tablet 0    ibuprofen (MOTRIN) 600 mg tablet Take  by mouth. Indications: PAIN         Review of Systems  Constitutional: The patient has no acute distress or discomfort. HEENT: The patient denies recent head trauma, eye pain, blurred vision,  hearing deficit, oropharyngeal mucosal pain or lesions, and the patient denies throat pain or discomfort. Lymphatics: The patient denies palpable peripheral lymphadenopathy. Hematologic: The patient denies having bruising, bleeding, or progressive fatigue. Respiratory: Patient denies having shortness of breath, cough, sputum production, fever, or dyspnea on exertion. Cardiovascular: The patient denies having leg pain, leg swelling, heart palpitations, chest permit, chest pain, or lightheadedness. The patient denies having dyspnea on exertion. Gastrointestinal: The patient denies having nausea, emesis, or diarrhea. The patient denies having any hematemesis or blood in the stool. Genitourinary: Patient denies having urinary urgency, frequency, or dysuria. The patient denies having blood in the urine.   Psychological: The patient denies having symptoms of nervousness, anxiety, depression, or thoughts of harming himself some of this. Skin: Patient denies having skin rashes, skin, ulcerations, or unexplained itching or pruritus. Musculoskeletal: The patient denies having pain in the joints or bones. Objective:     Visit Vitals  /80   Pulse 63   Temp 97.7 °F (36.5 °C) (Oral)   Resp 16   Ht 5' 1\" (1.549 m)   Wt 64 kg (141 lb)   LMP 10/06/2014   SpO2 100%   BMI 26.64 kg/m²     ECOG PS=0  Physical Exam:   Gen. Appearance: The patient is in no acute distress. Skin: There is no bruise or rash. HEENT: The exam is unremarkable. Neck: Supple without lymphadenopathy or thyromegaly. Lungs: Clear to auscultation and percussion; there are no wheezes or rhonchi. Heart: Regular rate and rhythm; there are no murmurs, gallops, or rubs. Abdomen: Bowel sounds are present and normal.  There is no guarding, tenderness, or hepatosplenomegaly. Extremities: There is no clubbing, cyanosis, or edema. Neurologic: There are no focal neurologic deficits. Lymphatics: There is no palpable peripheral lymphadenopathy. Musculoskeletal: The patient has full range of motion at all joints. There is no evidence of joint deformity or effusions. There is no focal joint tenderness. Psychological/psychiatric: There is no clinical evidence of anxiety, depression, or melancholy. Lab data:      Results for orders placed or performed during the hospital encounter of 03/06/19   CBC WITH 3 PART DIFF     Status: Abnormal   Result Value Ref Range Status    WBC 9.2 4.5 - 13.0 K/uL Final    RBC 5.33 (H) 4.10 - 5.10 M/uL Final    HGB 12.8 12.0 - 16.0 g/dL Final    HCT 40.6 36 - 48 % Final    MCV 76.2 (L) 78 - 102 FL Final    MCH 24.0 (L) 25.0 - 35.0 PG Final    MCHC 31.5 31 - 37 g/dL Final    RDW 14.2 11.5 - 14.5 % Final    PLATELET 987 463 - 033 K/uL Final    NEUTROPHILS 56 40 - 70 % Final    MIXED CELLS 9 0.1 - 17 % Final    LYMPHOCYTES 35 14 - 44 % Final    ABS. NEUTROPHILS 5.2 1.8 - 9.5 K/UL Final    ABS.  MIXED CELLS 0.8 0.0 - 2.3 K/uL Final ABS. LYMPHOCYTES 3.2 1.1 - 5.9 K/UL Final     Comment: Test performed at [address] [place] [address] or Outpatient Infusion Center Location. Reviewed by Medical Director. DF AUTOMATED   Final           Assessment:     1. Neutrophilic leukocytosis    2. Erythrocytosis          Plan:     Neutrophilic Leukocytosis: CBC from today revealed normal WBC of 9.2, the absolute neutrophil count is 5.2 and the neutrophils represent 56% of the total WBC count. We will continue to monitor these values at 3-6 month intervals. A CMP will also be orderd. Erythrocytosis: Total RBC;s today was 5.33, which showed at slight decrease from 5 months ago which was 5.66. Her hemoglobin and hematocrit are normal at 12.8 g/dL and 40.6% respectively. Her MCV is currently 76.2. At this time an CMP, Vitamin D, and B12, and folate will be obtained. The patient was advised to avoid any iron supplements at this time. Return to clinic in 12 weeks. Orders Placed This Encounter    COMPLETE CBC & AUTO DIFF WBC    InHouse CBC ([email or web address])     Standing Status:   Future     Number of Occurrences:   1     Standing Expiration Date:   2/21/0009    METABOLIC PANEL, COMPREHENSIVE     Standing Status:   Future     Standing Expiration Date:   3/6/2020    IRON PROFILE     Standing Status:   Future     Standing Expiration Date:   3/6/2020    FERRITIN     Standing Status:   Future     Standing Expiration Date:   3/6/2020       Augie Medina MD  3/6/2019       I have assessed the patient independently and  agree with the full assessment as outlined. Marc Kinsey MD, FACP      Please note: This document has been produced using voice recognition software. Unrecognized errors in transcription may be present.

## 2019-03-07 LAB
25(OH)D3 SERPL-MCNC: 19.9 NG/ML (ref 32–100)
A-G RATIO,AGRAT: 1.5 RATIO (ref 1.1–2.6)
ALBUMIN SERPL-MCNC: 4.4 G/DL (ref 3.5–5)
ALP SERPL-CCNC: 77 U/L (ref 25–115)
ALT SERPL-CCNC: 13 U/L (ref 5–40)
ANION GAP SERPL CALC-SCNC: 16 MMOL/L
AST SERPL W P-5'-P-CCNC: 9 U/L (ref 10–37)
BILIRUB SERPL-MCNC: 0.3 MG/DL (ref 0.2–1.2)
BUN SERPL-MCNC: 7 MG/DL (ref 6–22)
CALCIUM SERPL-MCNC: 9.7 MG/DL (ref 8.4–10.5)
CHLORIDE SERPL-SCNC: 98 MMOL/L (ref 98–110)
CO2 SERPL-SCNC: 25 MMOL/L (ref 20–32)
CREAT SERPL-MCNC: 0.6 MG/DL (ref 0.5–1.2)
FE % SATURATION,PSAT: 25 % (ref 20–50)
FERRITIN SERPL-MCNC: 128 NG/ML (ref 10–291)
FOLATE,FOL: 8.19 NG/ML
GFRAA, 66117: >60
GFRNA, 66118: >60
GLOBULIN,GLOB: 2.9 G/DL (ref 2–4)
GLUCOSE SERPL-MCNC: 84 MG/DL (ref 70–99)
IRON,IRN: 74 MCG/DL (ref 30–160)
POTASSIUM SERPL-SCNC: 4.4 MMOL/L (ref 3.5–5.5)
PROT SERPL-MCNC: 7.3 G/DL (ref 6.4–8.3)
SODIUM SERPL-SCNC: 139 MMOL/L (ref 133–145)
TIBC,TIBC: 292 MCG/DL (ref 228–428)
UIBC SERPL-MCNC: 218 MCG/DL (ref 110–370)
VIT B12 SERPL-MCNC: 313 PG/ML (ref 211–911)

## 2019-03-08 DIAGNOSIS — E55.9 VITAMIN D DEFICIENCY: ICD-10-CM

## 2019-03-08 RX ORDER — ERGOCALCIFEROL 1.25 MG/1
50000 CAPSULE ORAL
Qty: 12 CAP | Refills: 0 | Status: SHIPPED | OUTPATIENT
Start: 2019-03-08

## 2019-05-30 ENCOUNTER — OFFICE VISIT (OUTPATIENT)
Dept: ONCOLOGY | Age: 34
End: 2019-05-30

## 2019-05-30 ENCOUNTER — HOSPITAL ENCOUNTER (OUTPATIENT)
Dept: ONCOLOGY | Age: 34
Discharge: HOME | End: 2019-05-30

## 2019-05-30 DIAGNOSIS — D72.9 NEUTROPHILIC LEUKOCYTOSIS: ICD-10-CM

## 2019-05-30 DIAGNOSIS — D72.9 NEUTROPHILIC LEUKOCYTOSIS: Primary | ICD-10-CM

## 2019-06-05 RX ORDER — SERTRALINE HYDROCHLORIDE 50 MG/1
25 TABLET, FILM COATED ORAL DAILY
COMMUNITY
End: 2021-01-05

## 2019-06-11 ENCOUNTER — HOSPITAL ENCOUNTER (OUTPATIENT)
Age: 34
Setting detail: OUTPATIENT SURGERY
Discharge: HOME | End: 2019-06-11
Attending: INTERNAL MEDICINE | Admitting: INTERNAL MEDICINE
Payer: MEDICAID

## 2019-06-11 VITALS
HEIGHT: 61 IN | TEMPERATURE: 98.3 F | RESPIRATION RATE: 17 BRPM | WEIGHT: 134 LBS | DIASTOLIC BLOOD PRESSURE: 52 MMHG | SYSTOLIC BLOOD PRESSURE: 98 MMHG | OXYGEN SATURATION: 100 % | BODY MASS INDEX: 25.3 KG/M2 | HEART RATE: 74 BPM

## 2019-06-11 PROCEDURE — 76040000019: Performed by: INTERNAL MEDICINE

## 2019-06-11 RX ORDER — EPINEPHRINE 0.1 MG/ML
1 INJECTION INTRACARDIAC; INTRAVENOUS
Status: DISCONTINUED | OUTPATIENT
Start: 2019-06-11 | End: 2019-06-11

## 2019-06-11 RX ORDER — NALOXONE HYDROCHLORIDE 0.4 MG/ML
0.4 INJECTION, SOLUTION INTRAMUSCULAR; INTRAVENOUS; SUBCUTANEOUS
Status: DISCONTINUED | OUTPATIENT
Start: 2019-06-11 | End: 2019-06-11

## 2019-06-11 RX ORDER — SODIUM CHLORIDE, SODIUM LACTATE, POTASSIUM CHLORIDE, CALCIUM CHLORIDE 600; 310; 30; 20 MG/100ML; MG/100ML; MG/100ML; MG/100ML
75 INJECTION, SOLUTION INTRAVENOUS CONTINUOUS
Status: DISCONTINUED | OUTPATIENT
Start: 2019-06-12 | End: 2019-06-11

## 2019-06-11 RX ORDER — SODIUM CHLORIDE 0.9 % (FLUSH) 0.9 %
5-40 SYRINGE (ML) INJECTION EVERY 8 HOURS
Status: DISCONTINUED | OUTPATIENT
Start: 2019-06-11 | End: 2019-06-11 | Stop reason: HOSPADM

## 2019-06-11 RX ORDER — DEXTROMETHORPHAN/PSEUDOEPHED 2.5-7.5/.8
1.2 DROPS ORAL
Status: DISCONTINUED | OUTPATIENT
Start: 2019-06-11 | End: 2019-06-11

## 2019-06-11 RX ORDER — ATROPINE SULFATE 0.1 MG/ML
0.5 INJECTION INTRAVENOUS
Status: DISCONTINUED | OUTPATIENT
Start: 2019-06-11 | End: 2019-06-11

## 2019-06-11 RX ORDER — FLUMAZENIL 0.1 MG/ML
0.2 INJECTION INTRAVENOUS
Status: DISCONTINUED | OUTPATIENT
Start: 2019-06-11 | End: 2019-06-11

## 2019-06-11 RX ORDER — SODIUM CHLORIDE 0.9 % (FLUSH) 0.9 %
5-40 SYRINGE (ML) INJECTION AS NEEDED
Status: DISCONTINUED | OUTPATIENT
Start: 2019-06-11 | End: 2019-06-11 | Stop reason: HOSPADM

## 2019-06-11 NOTE — PROCEDURES
[facility]  [facility], [address]      Brief Procedure Note    Beth Zamora  1985  [id number]    Date of Procedure: 6/11/2019    Preoperative diagnosis: 789.04 - R10.32,  Left lower quadrant pain  564.09 - K59.00,  Constipation, chronic  569.3 - K62.5,  Rectal bleeding    Postoperative diagnosis:  Normal    Type of Anesthesia: MAC (monitered anesthesia care)    Description of Findings: same as post op dx    Procedure: Procedure(s):  SIGMOIDOSCOPY FLEXIBLE    :  Dr. Dayron Hayward MD    Assistant(s): [unfilled]    Type of Anesthesia:MAC     EBL:None    Specimens: * No specimens in log *    Findings: See printed and scanned procedure note    Complications: None    Dr. Dayrno Hayward MD  6/11/2019  3:31 PM

## 2019-06-11 NOTE — H&P
[facility]   [email or web address]      Impression:   1. LLQ pain  2. Rectal bleeding      Plan:     1. Flex sig with MAC      Chief Complaint: Rectal bleeding      HPI:  Mayra Mccall is a 37 y.o. female who is being seen preop for rectal bleeding and 2 months of LLQ pain.     PMH:   Past Medical History:   Diagnosis Date    Anemia     Hemorrhoids     Joint pain     Muscle pain        PSH:   Past Surgical History:   Procedure Laterality Date    HX GYN      iud inserted and removed    HX HYSTERECTOMY  2014    HX OTHER SURGICAL  Lap in 2000    HX TUBAL LIGATION         Social HX:   Social History     Socioeconomic History    Marital status: UNKNOWN     Spouse name: Not on file    Number of children: Not on file    Years of education: Not on file    Highest education level: Not on file   Occupational History    Not on file   Social Needs    Financial resource strain: Not on file    Food insecurity:     Worry: Not on file     Inability: Not on file    Transportation needs:     Medical: Not on file     Non-medical: Not on file   Tobacco Use    Smoking status: Never Smoker    Smokeless tobacco: Never Used   Substance and Sexual Activity    Alcohol use: No    Drug use: No    Sexual activity: Yes     Partners: Male   Lifestyle    Physical activity:     Days per week: Not on file     Minutes per session: Not on file    Stress: Not on file   Relationships    Social connections:     Talks on phone: Not on file     Gets together: Not on file     Attends [employer] service: Not on file     Active member of club or organization: Not on file     Attends meetings of clubs or organizations: Not on file     Relationship status: Not on file    Intimate partner violence:     Fear of current or ex partner: Not on file     Emotionally abused: Not on file     Physically abused: Not on file     Forced sexual activity: Not on file   Other Topics Concern    Not on file   Social History Narrative    Not on file       FHX:   Family History   Problem Relation Age of Onset    Diabetes Mother     Hypertension Mother     Stroke Maternal Grandfather     Breast Cancer Paternal Aunt     Hypertension Father     Depression Father        Allergy:   No Known Allergies    Home Medications:     Medications Prior to Admission   Medication Sig    sertraline (ZOLOFT) 50 mg tablet Take 25 mg by mouth daily.  ergocalciferol (ERGOCALCIFEROL) 50,000 unit capsule Take 1 Cap by mouth every seven (7) days.  meloxicam (MOBIC) 15 mg tablet TAKE 1 TAB BY MOUTH DAILY AS NEEDED FOR PAIN. TAKE WITH FOOD    Cholecalciferol, Vitamin D3, (VITAMIN D3) 2,000 unit cap capsule Take  by mouth two (2) times a day.  oxycodone-acetaminophen (PERCOCET) 5-325 mg per tablet Take 1-2 tablets by mouth every six (6) hours as needed for Pain.  ibuprofen (MOTRIN) 600 mg tablet Take  by mouth. Indications: PAIN       Review of Systems:     Constitutional: No fevers, chills, weight loss, fatigue. Cardiovascular: No chest pain, heart palpitations. Respiratory: No cough, SOB, wheezing, chest discomfort, orthopnea. Gastrointestinal: LLQ pain and rectal blood. Musculoskeletal: No weakness, arthralgias, wasting. Allergies: As noted. Visit Vitals  Ht 5' 1\" (1.549 m)   Wt 63 kg (139 lb)   LMP 10/06/2014   BMI 26.26 kg/m²       Physical Assessment:     constitutional: appearance: well developed, well nourished, normal habitus, no deformities, in no acute distress. ENMT: mouth: normal oral mucosa,lips and gums; good dentition. oropharynx: normal tongue, hard and soft palate; posterior pharynx without erithema, exudate or lesions. respiratory: effort: normal chest excursion; no intercostal retraction or accessory muscle use. cardiovascular: abdominal aorta: normal size and position; no bruits.  palpation: PMI of normal size and position; normal rhythm; no thrill or murmurs. abdominal: abdomen: normal consistency; no tenderness or masses. hernias: no hernias appreciated. liver: normal size and consistency. spleen: not palpable. rectal: hemoccult/guaiac: not performed. musculoskeletal: digits and nails: no clubbing, cyanosis, petechiae or other inflammatory conditions. psychiatric: orientation: oriented to time, space and person. Karla Wheatley MD, MMamieD. [facility], [address] [place] [address]  Pager [phone]  [email or web address]

## 2019-06-11 NOTE — DISCHARGE INSTRUCTIONS
Patient Education        Sigmoidoscopy: What to Expect at [address]    A sigmoidoscopy lets your doctor look inside the lower part of your large intestine. This is also called the colon. The doctor uses a lighted tube called a sigmoidoscope (or scope). This test let the doctor look for small growths (called polyps), cancer, bleeding, hemorrhoids, or other problems. The doctor also may have used the scope to remove polyps. Or he or she may have used it to take tissue samples that need to be tested. You shouldn't have any pain after the procedure. But it is normal to pass gas. You may have mild discomfort from having gas. If your doctor removed polyps, you will likely need to schedule a colonoscopy to look at the whole colon. This care sheet gives you a general idea about how long it will take for you to recover. But each person recovers at a different pace. Follow the steps below to get better as quickly as possible. How can you care for yourself at home? Activity    · Most people are able to return to work right away unless they have had a sedative during the procedure.     · You may need someone to drive you home if you have had a sedative. In most cases, you can drive yourself home. Diet    · You can eat your normal diet. If your stomach is upset, try bland, low-fat foods like plain rice, broiled chicken, toast, and yogurt.     · Be sure to drink plenty of liquids to replace those you have lost during the preparation for the procedure. Exercise    · You can return to normal exercise right away.    Medicine    · Your doctor will tell you if and when you can restart your medicines. He or she will also give you instructions about taking any new medicines.     · If you take blood thinners, such as warfarin (Coumadin), clopidogrel (Plavix), or aspirin, be sure to talk to your doctor. He or she will tell you if and when to start taking those medicines again.  Make sure that you understand exactly what your doctor wants you to do. Follow-up care is a key part of your treatment and safety. Be sure to make and go to all appointments, and call your doctor if you are having problems. It's also a good idea to know your test results and keep a list of the medicines you take. When should you call for help? Call your doctor now or seek immediate medical care if:    · You have new or worse belly pain.     · You have blood in your stools.    Watch closely for changes in your health, and be sure to contact your doctor if you have any problems. Where can you learn more? Go to http://maxi-[facility].info/. Enter Y026 in the search box to learn more about \"Sigmoidoscopy: What to Expect at Home. \"  Current as of: March 27, 2018  Content Version: 11.9  © 4325-1820 [employer]. Care instructions adapted under license by [employer] (which disclaims liability or warranty for this information). If you have questions about a medical condition or this instruction, always ask your healthcare professional. [address] any warranty or liability for your use of this information.

## 2019-06-13 ENCOUNTER — OFFICE VISIT (OUTPATIENT)
Dept: ONCOLOGY | Age: 34
End: 2019-06-13

## 2019-06-13 ENCOUNTER — HOSPITAL ENCOUNTER (OUTPATIENT)
Dept: ONCOLOGY | Age: 34
Discharge: HOME | End: 2019-06-13

## 2019-06-13 VITALS
SYSTOLIC BLOOD PRESSURE: 109 MMHG | DIASTOLIC BLOOD PRESSURE: 68 MMHG | HEIGHT: 61 IN | RESPIRATION RATE: 18 BRPM | WEIGHT: 138 LBS | HEART RATE: 66 BPM | BODY MASS INDEX: 26.06 KG/M2 | TEMPERATURE: 99.1 F | OXYGEN SATURATION: 99 %

## 2019-06-13 DIAGNOSIS — D72.9 NEUTROPHILIC LEUKOCYTOSIS: ICD-10-CM

## 2019-06-13 DIAGNOSIS — D72.9 NEUTROPHILIC LEUKOCYTOSIS: Primary | ICD-10-CM

## 2019-06-13 DIAGNOSIS — D75.1 ERYTHROCYTOSIS: ICD-10-CM

## 2019-06-13 LAB
BASO+EOS+MONOS # BLD AUTO: 0.7 K/UL (ref 0–2.3)
BASO+EOS+MONOS NFR BLD AUTO: 10 % (ref 0.1–17)
DIFFERENTIAL METHOD BLD: ABNORMAL
ERYTHROCYTE [DISTWIDTH] IN BLOOD BY AUTOMATED COUNT: 14.2 % (ref 11.5–14.5)
HCT VFR BLD AUTO: 40 % (ref 36–48)
HGB BLD-MCNC: 12.4 G/DL (ref 12–16)
LYMPHOCYTES # BLD: 3.1 K/UL (ref 1.1–5.9)
LYMPHOCYTES NFR BLD: 45 % (ref 14–44)
MCH RBC QN AUTO: 23.4 PG (ref 25–35)
MCHC RBC AUTO-ENTMCNC: 31 G/DL (ref 31–37)
MCV RBC AUTO: 75.3 FL (ref 78–102)
NEUTS SEG # BLD: 3.1 K/UL (ref 1.8–9.5)
NEUTS SEG NFR BLD: 46 % (ref 40–70)
PLATELET # BLD AUTO: 319 K/UL (ref 140–440)
RBC # BLD AUTO: 5.31 M/UL (ref 4.1–5.1)
WBC # BLD AUTO: 6.9 K/UL (ref 4.5–13)

## 2019-06-13 NOTE — PROGRESS NOTES
Hematology/Oncology  Progress Note    Name: Jerel Li  Date: 2019  : 1985    PCP: Margarito Schroeder MD     Ms. Eric Lara is a 37 y.o.  female who was seen for erythrocytosis. Current therapy: Active surveillance; therapeutic phlebotomy will be provided if the hematocrit exceeds 45%. Subjective:     Mrs. Eric Lara is a 47-year-old -American woman who was previously evaluated for elevated RBC's. There was no abnormalities found in the work up. She is here today for a 6 month f/u. The patient reports that she is doing well. She has no complaints to report. Patient states that she takes a oral Vitamin D weekly, but denies the use of any other vitamin supplementation. Patient states that she lives an active lifestyle and follows a well balanced diet. She denies any know history of hematologic disorders. Past medical history, family history, and social history: these were reviewed and remains unchanged.     Past Medical History:   Diagnosis Date    Anemia     Hemorrhoids     Joint pain     Muscle pain      Past Surgical History:   Procedure Laterality Date    FLEXIBLE SIGMOIDOSCOPY N/A 2019    SIGMOIDOSCOPY FLEXIBLE performed by Joyce Cee MD at [facility] HX GYN      iud inserted and removed    HX HYSTERECTOMY      HX OTHER SURGICAL  Lap in     HX TUBAL LIGATION       Social History     Socioeconomic History    Marital status: UNKNOWN     Spouse name: Not on file    Number of children: Not on file    Years of education: Not on file    Highest education level: Not on file   Occupational History    Not on file   Social Needs    Financial resource strain: Not on file    Food insecurity:     Worry: Not on file     Inability: Not on file    Transportation needs:     Medical: Not on file     Non-medical: Not on file   Tobacco Use    Smoking status: Never Smoker    Smokeless tobacco: Never Used   Substance and Sexual Activity    Alcohol use: No    Drug use: No    Sexual activity: Yes     Partners: Male   Lifestyle    Physical activity:     Days per week: Not on file     Minutes per session: Not on file    Stress: Not on file   Relationships    Social connections:     Talks on phone: Not on file     Gets together: Not on file     Attends [employer] service: Not on file     Active member of club or organization: Not on file     Attends meetings of clubs or organizations: Not on file     Relationship status: Not on file    Intimate partner violence:     Fear of current or ex partner: Not on file     Emotionally abused: Not on file     Physically abused: Not on file     Forced sexual activity: Not on file   Other Topics Concern    Not on file   Social History Narrative    Not on file     Family History   Problem Relation Age of Onset    Diabetes Mother     Hypertension Mother     Stroke Maternal Grandfather     Breast Cancer Paternal Aunt     Hypertension Father     Depression Father      Current Outpatient Medications   Medication Sig Dispense Refill    sertraline (ZOLOFT) 50 mg tablet Take 25 mg by mouth daily.  ergocalciferol (ERGOCALCIFEROL) 50,000 unit capsule Take 1 Cap by mouth every seven (7) days. 12 Cap 0    meloxicam (MOBIC) 15 mg tablet TAKE 1 TAB BY MOUTH DAILY AS NEEDED FOR PAIN. TAKE WITH FOOD  1    Cholecalciferol, Vitamin D3, (VITAMIN D3) 2,000 unit cap capsule Take  by mouth two (2) times a day.  oxycodone-acetaminophen (PERCOCET) 5-325 mg per tablet Take 1-2 tablets by mouth every six (6) hours as needed for Pain. 40 tablet 0    ibuprofen (MOTRIN) 600 mg tablet Take  by mouth. Indications: PAIN         Review of Systems  Constitutional: The patient has no acute distress or discomfort. HEENT: The patient denies recent head trauma, eye pain, blurred vision,  hearing deficit, oropharyngeal mucosal pain or lesions, and the patient denies throat pain or discomfort. Lymphatics:  The patient denies palpable peripheral lymphadenopathy. Hematologic: The patient denies having bruising, bleeding, or progressive fatigue. Respiratory: Patient denies having shortness of breath, cough, sputum production, fever, or dyspnea on exertion. Cardiovascular: The patient denies having leg pain, leg swelling, heart palpitations, chest permit, chest pain, or lightheadedness. The patient denies having dyspnea on exertion. Gastrointestinal: The patient denies having nausea, emesis, or diarrhea. The patient denies having any hematemesis or blood in the stool. Genitourinary: Patient denies having urinary urgency, frequency, or dysuria. The patient denies having blood in the urine. Psychological: The patient denies having symptoms of nervousness, anxiety, depression, or thoughts of harming himself some of this. Skin: Patient denies having skin rashes, skin, ulcerations, or unexplained itching or pruritus. Musculoskeletal: The patient denies having pain in the joints or bones. Objective:     Visit Vitals  /68   Pulse 66   Temp 99.1 °F (37.3 °C) (Oral)   Resp 18   Ht 5' 1\" (1.549 m)   Wt 62.6 kg (138 lb)   LMP 10/06/2014   SpO2 99%   BMI 26.07 kg/m²     ECOG PS=0  Physical Exam:   Gen. Appearance: The patient is in no acute distress. Skin: There is no bruise or rash. HEENT: The exam is unremarkable. Neck: Supple without lymphadenopathy or thyromegaly. Lungs: Clear to auscultation and percussion; there are no wheezes or rhonchi. Heart: Regular rate and rhythm; there are no murmurs, gallops, or rubs. Abdomen: Bowel sounds are present and normal.  There is no guarding, tenderness, or hepatosplenomegaly. Extremities: There is no clubbing, cyanosis, or edema. Neurologic: There are no focal neurologic deficits. Lymphatics: There is no palpable peripheral lymphadenopathy. Musculoskeletal: The patient has full range of motion at all joints. There is no evidence of joint deformity or effusions.   There is no focal joint tenderness. Psychological/psychiatric: There is no clinical evidence of anxiety, depression, or melancholy. Lab data:      Results for orders placed or performed during the hospital encounter of 06/13/19   CBC WITH 3 PART DIFF     Status: Abnormal   Result Value Ref Range Status    WBC 6.9 4.5 - 13.0 K/uL Final    RBC 5.31 (H) 4.10 - 5.10 M/uL Final    HGB 12.4 12.0 - 16.0 g/dL Final    HCT 40.0 36 - 48 % Final    MCV 75.3 (L) 78 - 102 FL Final    MCH 23.4 (L) 25.0 - 35.0 PG Final    MCHC 31.0 31 - 37 g/dL Final    RDW 14.2 11.5 - 14.5 % Final    PLATELET 541 501 - 128 K/uL Final    NEUTROPHILS 46 40 - 70 % Final    MIXED CELLS 10 0.1 - 17 % Final    LYMPHOCYTES 45 (H) 14 - 44 % Final    ABS. NEUTROPHILS 3.1 1.8 - 9.5 K/UL Final    ABS. MIXED CELLS 0.7 0.0 - 2.3 K/uL Final    ABS. LYMPHOCYTES 3.1 1.1 - 5.9 K/UL Final     Comment: Test performed at [address] [place] [address] or Outpatient Infusion Center Location. Reviewed by Medical Director. DF AUTOMATED   Final           Assessment:     1. Neutrophilic leukocytosis    2. Erythrocytosis          Plan:     Neutrophilic Leukocytosis: CBC from today revealed normal WBC of 6.9, the absolute neutrophil count is 3.1 and the neutrophils represent 45 % of the total WBC count. We will continue to monitor these values at 3-6 month intervals. A CMP will also be orderd. Erythrocytosis: Total RBC;s today was 5.31, which showed at slight decrease from 5 months ago which was 5.66. Her hemoglobin and hematocrit are normal at 12.4 g/dL with hematocrit of 40%  respectively. Her MCV is currently 75.3. At this time an CMP, Vitamin D, and B12, and folate will be obtained. The patient was advised to avoid any iron supplements at this time. Return to clinic in 12 weeks.   Orders Placed This Encounter    COMPLETE CBC & AUTO DIFF WBC    InHouse CBC ([employer])     Standing Status:   Future     Number of Occurrences:   1     Standing Expiration Date:   2/11/0292    METABOLIC PANEL, COMPREHENSIVE     Standing Status:   Future     Standing Expiration Date:   6/13/2020    IRON PROFILE     Standing Status:   Future     Standing Expiration Date:   6/13/2020    FERRITIN     Standing Status:   Future     Standing Expiration Date:   6/13/2020       Merlin Grandchild, MD  6/13/2019             Please note: This document has been produced using voice recognition software. Unrecognized errors in transcription may be present.

## 2019-06-13 NOTE — PATIENT INSTRUCTIONS
Polycythemia: Care Instructions Your Care Instructions Polycythemia (say \"paw-olya-sy-THEE-marialuisa-uh) is an abnormal increase in red blood cells. It happens when the tissue inside your bones (bone marrow) makes too much blood. It also can occur if your blood does not have enough liquid, or plasma. This can make the number of red blood cells seem higher than normal. The extra red blood cells make your blood thicker than normal. This may raise your risk for blood clots that can cause heart attacks or strokes. Clots can form in the deep veins of the body, a condition called deep vein thrombosis. Or, a clot can travel through the blood to a lung (a pulmonary embolism). Your doctor may treat you by taking out some of your blood (phlebotomy). The process is like donating blood. Your doctor may even recommend that you donate blood. You may take pills to stop your body from making red blood cells. You also will get treatment for any other conditions that may cause your body to make too many red blood cells. Follow-up care is a key part of your treatment and safety. Be sure to make and go to all appointments, and call your doctor if you are having problems. It's also a good idea to know your test results and keep a list of the medicines you take. How can you care for yourself at home? · Be safe with medicines. Take your medicines exactly as prescribed. Call your doctor if you think you are having a problem with your medicine. · Drink plenty of fluids, enough so that your urine is light yellow or clear like water, before and after you have blood removed. If you have kidney, heart, or liver disease and have to limit fluids, talk with your doctor before you increase the amount of fluids you drink. · Take it easy after you have had blood removed. Do not do vigorous exercise. · If your doctor recommends aspirin, take it exactly as prescribed. Call your doctor if you think you are having a problem with your medicine. · Do not smoke. Smoking increases the risk of blood clots and may reduce the amount of oxygen in your blood. If you need help quitting, talk to your doctor about stop-smoking programs and medicines. These can increase your chances of quitting for good. · Take an antihistamine, such as a nondrowsy one like loratadine (Claritin) or one that might make you sleepy like diphenhydramine (Benadryl), if your skin is itchy. Some people who have this condition have itching. · Wear medical alert jewelry that lists your clotting problem. You can buy this at most [employer]. When should you call for help? Call 911 anytime you think you may need emergency care. For example, call if: 
  · You have sudden chest pain and shortness of breath, or you cough up blood.  
  · You have symptoms of a stroke. These may include: 
? Sudden numbness, tingling, weakness, or loss of movement in your face, arm, or leg, especially on only one side of your body. ? Sudden vision changes. ? Sudden trouble speaking. ? Sudden confusion or trouble understanding simple statements. ? Sudden problems with walking or balance. ? A sudden, severe headache that is different from past headaches.  
  · You have symptoms of a heart attack. These may include: 
? Chest pain or pressure, or a strange feeling in the chest. 
? Sweating. ? Shortness of breath. ? Nausea or vomiting. ? Pain, pressure, or a strange feeling in the back, neck, jaw, or upper belly or in one or both shoulders or arms. ? Lightheadedness or sudden weakness. ? A fast or irregular heartbeat. After you call 911, the  may tell you to chew 1 adult-strength or 2 to 4 low-dose aspirin. Wait for an ambulance. Do not try to drive yourself.  
 Call your doctor now or seek immediate medical care if: 
  · You have signs of a blood clot, such as: 
? Pain in your calf, back of knee, thigh, or groin. ? Redness and swelling in your leg or groin.  Watch closely for changes in your health, and be sure to contact your doctor if you have any problems. Where can you learn more? Go to http://maxi-dominic.info/. Enter W581 in the search box to learn more about \"Polycythemia: Care Instructions. \" Current as of: May 6, 2018 Content Version: 11.9 © 7687-6684 [employer]. Care instructions adapted under license by [employer] (which disclaims liability or warranty for this information). If you have questions about a medical condition or this instruction, always ask your healthcare professional. [address] any warranty or liability for your use of this information.

## 2019-06-14 LAB
A-G RATIO,AGRAT: 1.3 RATIO (ref 1.1–2.6)
ALBUMIN SERPL-MCNC: 4.1 G/DL (ref 3.5–5)
ALP SERPL-CCNC: 70 U/L (ref 25–115)
ALT SERPL-CCNC: 9 U/L (ref 5–40)
ANION GAP SERPL CALC-SCNC: 14 MMOL/L
AST SERPL W P-5'-P-CCNC: 9 U/L (ref 10–37)
BILIRUB SERPL-MCNC: 0.4 MG/DL (ref 0.2–1.2)
BUN SERPL-MCNC: 9 MG/DL (ref 6–22)
CALCIUM SERPL-MCNC: 9.6 MG/DL (ref 8.4–10.5)
CHLORIDE SERPL-SCNC: 101 MMOL/L (ref 98–110)
CO2 SERPL-SCNC: 22 MMOL/L (ref 20–32)
CREAT SERPL-MCNC: 0.8 MG/DL (ref 0.5–1.2)
FE % SATURATION,PSAT: 27 % (ref 20–50)
FERRITIN SERPL-MCNC: 135 NG/ML (ref 10–291)
GFRAA, 66117: >60
GFRNA, 66118: >60
GLOBULIN,GLOB: 3.2 G/DL (ref 2–4)
GLUCOSE SERPL-MCNC: 115 MG/DL (ref 70–99)
IRON,IRN: 82 MCG/DL (ref 30–160)
POTASSIUM SERPL-SCNC: 4.5 MMOL/L (ref 3.5–5.5)
PROT SERPL-MCNC: 7.3 G/DL (ref 6.4–8.3)
SODIUM SERPL-SCNC: 137 MMOL/L (ref 133–145)
TIBC,TIBC: 300 MCG/DL (ref 228–428)
UIBC SERPL-MCNC: 218 MCG/DL (ref 110–370)

## 2019-09-19 ENCOUNTER — OFFICE VISIT (OUTPATIENT)
Dept: ONCOLOGY | Age: 34
End: 2019-09-19

## 2019-09-19 VITALS
DIASTOLIC BLOOD PRESSURE: 74 MMHG | RESPIRATION RATE: 16 BRPM | SYSTOLIC BLOOD PRESSURE: 110 MMHG | OXYGEN SATURATION: 98 % | TEMPERATURE: 98.8 F | WEIGHT: 136 LBS | HEART RATE: 74 BPM | HEIGHT: 61 IN | BODY MASS INDEX: 25.68 KG/M2

## 2019-09-19 DIAGNOSIS — D75.1 ERYTHROCYTOSIS: ICD-10-CM

## 2019-09-19 DIAGNOSIS — D72.9 NEUTROPHILIC LEUKOCYTOSIS: Primary | ICD-10-CM

## 2019-09-19 NOTE — PROGRESS NOTES
Hematology/Oncology  Progress Note    Name: Steph Eckert  Date: 2019  : 1985    PCP: Saud Barboza MD     Ms. Damion Turner is a 37 y.o.  female who was seen for erythrocytosis. Current therapy: Active surveillance; therapeutic phlebotomy will be provided if the hematocrit exceeds 45%. Subjective:     Mrs. Damion Turner is a 29-year-old -American woman who was previously evaluated for elevated RBC's. There was no abnormalities found in the work up. She is here today for a 3 month follow up . The patient reports that she is doing well. She has no physical concerns or complaints to report today. Patient states that she takes a oral Vitamin D weekly. Patient states that she lives an active lifestyle and follows a well balanced diet. She denies any know history of hematologic disorders. Past medical history, family history, and social history: these were reviewed and remains unchanged.     Past Medical History:   Diagnosis Date    Anemia     Hemorrhoids     Joint pain     Muscle pain      Past Surgical History:   Procedure Laterality Date    FLEXIBLE SIGMOIDOSCOPY N/A 2019    SIGMOIDOSCOPY FLEXIBLE performed by Verenice Covarrubias MD at [facility]      iud inserted and removed    HX HYSTERECTOMY      HX OTHER SURGICAL  Lap in     HX TUBAL LIGATION       Social History     Socioeconomic History    Marital status: UNKNOWN     Spouse name: Not on file    Number of children: Not on file    Years of education: Not on file    Highest education level: Not on file   Occupational History    Not on file   Social Needs    Financial resource strain: Not on file    Food insecurity:     Worry: Not on file     Inability: Not on file    Transportation needs:     Medical: Not on file     Non-medical: Not on file   Tobacco Use    Smoking status: Never Smoker    Smokeless tobacco: Never Used   Substance and Sexual Activity    Alcohol use: No    Drug use: No    Sexual activity: Yes     Partners: Male   Lifestyle    Physical activity:     Days per week: Not on file     Minutes per session: Not on file    Stress: Not on file   Relationships    Social connections:     Talks on phone: Not on file     Gets together: Not on file     Attends Jehovah's witness service: Not on file     Active member of club or organization: Not on file     Attends meetings of clubs or organizations: Not on file     Relationship status: Not on file    Intimate partner violence:     Fear of current or ex partner: Not on file     Emotionally abused: Not on file     Physically abused: Not on file     Forced sexual activity: Not on file   Other Topics Concern    Not on file   Social History Narrative    Not on file     Family History   Problem Relation Age of Onset    Diabetes Mother     Hypertension Mother     Stroke Maternal Grandfather     Breast Cancer Paternal Aunt     Hypertension Father     Depression Father      Current Outpatient Medications   Medication Sig Dispense Refill    sertraline (ZOLOFT) 50 mg tablet Take 25 mg by mouth daily.  ergocalciferol (ERGOCALCIFEROL) 50,000 unit capsule Take 1 Cap by mouth every seven (7) days. 12 Cap 0    meloxicam (MOBIC) 15 mg tablet TAKE 1 TAB BY MOUTH DAILY AS NEEDED FOR PAIN. TAKE WITH FOOD  1    Cholecalciferol, Vitamin D3, (VITAMIN D3) 2,000 unit cap capsule Take  by mouth two (2) times a day.  oxycodone-acetaminophen (PERCOCET) 5-325 mg per tablet Take 1-2 tablets by mouth every six (6) hours as needed for Pain. 40 tablet 0    ibuprofen (MOTRIN) 600 mg tablet Take  by mouth. Indications: PAIN         Review of Systems  Constitutional: The patient has no acute distress or discomfort. HEENT: The patient denies recent head trauma, eye pain, blurred vision,  hearing deficit, oropharyngeal mucosal pain or lesions, and the patient denies throat pain or discomfort. Lymphatics:  The patient denies palpable peripheral lymphadenopathy. Hematologic: The patient denies having bruising, bleeding, or progressive fatigue. Respiratory: Patient denies having shortness of breath, cough, sputum production, fever, or dyspnea on exertion. Cardiovascular: The patient denies having leg pain, leg swelling, heart palpitations, chest permit, chest pain, or lightheadedness. The patient denies having dyspnea on exertion. Gastrointestinal: The patient denies having nausea, emesis, or diarrhea. The patient denies having any hematemesis or blood in the stool. Genitourinary: Patient denies having urinary urgency, frequency, or dysuria. The patient denies having blood in the urine. Psychological: The patient denies having symptoms of nervousness, anxiety, depression, or thoughts of harming himself some of this. Skin: Patient denies having skin rashes, skin, ulcerations, or unexplained itching or pruritus. Musculoskeletal: The patient denies having pain in the joints or bones. Objective:     Visit Vitals  Blood Pressure 110/74   Pulse 74   Temperature 98.8 °F (37.1 °C) (Oral)   Respiration 16   Height 5' 1\" (1.549 m)   Weight 61.7 kg (136 lb)   Last Menstrual Period 10/06/2014   Oxygen Saturation 98%   Body Mass Index 25.70 kg/m²     ECOG PS=0, pain= 0/10  Physical Exam:   Gen. Appearance: The patient is in no acute distress. Skin: There is no bruise or rash. HEENT: The exam is unremarkable. Neck: Supple without lymphadenopathy or thyromegaly. Lungs: Clear to auscultation and percussion; there are no wheezes or rhonchi. Heart: Regular rate and rhythm; there are no murmurs, gallops, or rubs. Abdomen: Bowel sounds are present and normal.  There is no guarding, tenderness, or hepatosplenomegaly. Extremities: There is no clubbing, cyanosis, or edema. Neurologic: There are no focal neurologic deficits. Lymphatics: There is no palpable peripheral lymphadenopathy. Musculoskeletal: The patient has full range of motion at all joints. There is no evidence of joint deformity or effusions. There is no focal joint tenderness. Psychological/psychiatric: There is no clinical evidence of anxiety, depression, or melancholy. Lab data:      Results for orders placed or performed during the hospital encounter of 06/13/19   CBC WITH 3 PART DIFF     Status: Abnormal   Result Value Ref Range Status    WBC 6.9 4.5 - 13.0 K/uL Final    RBC 5.31 (H) 4.10 - 5.10 M/uL Final    HGB 12.4 12.0 - 16.0 g/dL Final    HCT 40.0 36 - 48 % Final    MCV 75.3 (L) 78 - 102 FL Final    MCH 23.4 (L) 25.0 - 35.0 PG Final    MCHC 31.0 31 - 37 g/dL Final    RDW 14.2 11.5 - 14.5 % Final    PLATELET 206 239 - 120 K/uL Final    NEUTROPHILS 46 40 - 70 % Final    MIXED CELLS 10 0.1 - 17 % Final    LYMPHOCYTES 45 (H) 14 - 44 % Final    ABS. NEUTROPHILS 3.1 1.8 - 9.5 K/UL Final    ABS. MIXED CELLS 0.7 0.0 - 2.3 K/uL Final    ABS. LYMPHOCYTES 3.1 1.1 - 5.9 K/UL Final     Comment: Test performed at [address] [place] [address] or [facility]patient [facility]fusion [facility] Location. Reviewed by Medical Director. DF AUTOMATED   Final           Assessment:     1. Neutrophilic leukocytosis    2. Erythrocytosis          Plan:     Neutrophilic Leukocytosis: CBC from 6/13/2019 revealed normal WBC of 6.9, the absolute neutrophil count is 3.1 and the neutrophils represent 45 % of the total WBC count. We will continue to monitor these values at 3-6 month intervals. A CMP will also be orderd. Erythrocytosis: Total RBC;s from 6/13/2019 was 5.31, which showed at slight decrease from 5 months ago which was 5.66. Her hemoglobin and hematocrit are normal at 12.4 g/dL with hematocrit of 40%  respectively. Her MCV is currently 75.3. At this time an CMP, will be obtained. The patient was advised to avoid any iron supplements at this time. Return to clinic in 12 weeks.   Orders Placed This Encounter    CBC WITH AUTOMATED DIFF     Standing Status:   Future     Number of Occurrences:   1 Standing Expiration Date:   4/19/9773    METABOLIC PANEL, COMPREHENSIVE     Standing Status:   Future     Number of Occurrences:   1     Standing Expiration Date:   9/19/2020       Isabel Steele NP  9/19/2019     I have assessed the patient independently and  agree with the full assessment as outlined. Romayne Like, MD, FACP            Please note: This document has been produced using voice recognition software. Unrecognized errors in transcription may be present.

## 2019-09-20 LAB
A-G RATIO,AGRAT: 1.5 RATIO (ref 1.1–2.6)
ABSOLUTE LYMPHOCYTE COUNT, 10803: 2.6 K/UL (ref 1–4.8)
ALBUMIN SERPL-MCNC: 4.3 G/DL (ref 3.5–5)
ALP SERPL-CCNC: 76 U/L (ref 25–115)
ALT SERPL-CCNC: 13 U/L (ref 5–40)
ANION GAP SERPL CALC-SCNC: 13 MMOL/L
AST SERPL W P-5'-P-CCNC: 13 U/L (ref 10–37)
BASOPHILS # BLD: 0.1 K/UL (ref 0–0.2)
BASOPHILS NFR BLD: 1 % (ref 0–2)
BILIRUB SERPL-MCNC: 1.1 MG/DL (ref 0.2–1.2)
BUN SERPL-MCNC: 8 MG/DL (ref 6–22)
CALCIUM SERPL-MCNC: 9.5 MG/DL (ref 8.4–10.5)
CHLORIDE SERPL-SCNC: 102 MMOL/L (ref 98–110)
CO2 SERPL-SCNC: 23 MMOL/L (ref 20–32)
CREAT SERPL-MCNC: 0.7 MG/DL (ref 0.5–1.2)
EOSINOPHIL # BLD: 0.1 K/UL (ref 0–0.5)
EOSINOPHIL NFR BLD: 1 % (ref 0–6)
ERYTHROCYTE [DISTWIDTH] IN BLOOD BY AUTOMATED COUNT: 15.3 % (ref 10–15.5)
GFRAA, 66117: >60
GFRNA, 66118: >60
GLOBULIN,GLOB: 2.9 G/DL (ref 2–4)
GLUCOSE SERPL-MCNC: 120 MG/DL (ref 70–99)
GRANULOCYTES,GRANS: 58 % (ref 40–75)
HCT VFR BLD AUTO: 43.4 % (ref 35.1–46.5)
HGB BLD-MCNC: 12.8 G/DL (ref 11.7–15.5)
LYMPHOCYTES, LYMLT: 32 % (ref 20–45)
MCH RBC QN AUTO: 23 PG (ref 26–34)
MCHC RBC AUTO-ENTMCNC: 30 G/DL (ref 31–36)
MCV RBC AUTO: 78 FL (ref 80–95)
MONOCYTES # BLD: 0.6 K/UL (ref 0.1–1)
MONOCYTES NFR BLD: 8 % (ref 3–12)
NEUTROPHILS # BLD AUTO: 4.7 K/UL (ref 1.8–7.7)
PLATELET # BLD AUTO: 317 K/UL (ref 140–440)
PMV BLD AUTO: 10.2 FL (ref 9–13)
POTASSIUM SERPL-SCNC: 4.2 MMOL/L (ref 3.5–5.5)
PROT SERPL-MCNC: 7.2 G/DL (ref 6.4–8.3)
RBC # BLD AUTO: 5.57 M/UL (ref 3.8–5.2)
SODIUM SERPL-SCNC: 138 MMOL/L (ref 133–145)
WBC # BLD AUTO: 8.1 K/UL (ref 4–11)

## 2019-12-19 ENCOUNTER — HOSPITAL ENCOUNTER (OUTPATIENT)
Dept: ONCOLOGY | Age: 34
Discharge: HOME | End: 2019-12-19

## 2019-12-19 ENCOUNTER — OFFICE VISIT (OUTPATIENT)
Dept: ONCOLOGY | Age: 34
End: 2019-12-19

## 2019-12-19 VITALS
RESPIRATION RATE: 16 BRPM | BODY MASS INDEX: 24.92 KG/M2 | HEIGHT: 61 IN | TEMPERATURE: 98.7 F | SYSTOLIC BLOOD PRESSURE: 96 MMHG | HEART RATE: 65 BPM | WEIGHT: 132 LBS | DIASTOLIC BLOOD PRESSURE: 59 MMHG | OXYGEN SATURATION: 92 %

## 2019-12-19 DIAGNOSIS — D72.9 NEUTROPHILIC LEUKOCYTOSIS: ICD-10-CM

## 2019-12-19 DIAGNOSIS — D75.1 ERYTHROCYTOSIS: ICD-10-CM

## 2019-12-19 DIAGNOSIS — D72.9 NEUTROPHILIC LEUKOCYTOSIS: Primary | ICD-10-CM

## 2019-12-19 LAB
BASO+EOS+MONOS # BLD AUTO: 0.5 K/UL (ref 0–2.3)
BASO+EOS+MONOS NFR BLD AUTO: 8 % (ref 0.1–17)
DIFFERENTIAL METHOD BLD: ABNORMAL
ERYTHROCYTE [DISTWIDTH] IN BLOOD BY AUTOMATED COUNT: 14.2 % (ref 11.5–14.5)
HCT VFR BLD AUTO: 41.4 % (ref 36–48)
HGB BLD-MCNC: 12.8 G/DL (ref 12–16)
LYMPHOCYTES # BLD: 2.7 K/UL (ref 1.1–5.9)
LYMPHOCYTES NFR BLD: 41 % (ref 14–44)
MCH RBC QN AUTO: 23.4 PG (ref 25–35)
MCHC RBC AUTO-ENTMCNC: 30.9 G/DL (ref 31–37)
MCV RBC AUTO: 75.8 FL (ref 78–102)
NEUTS SEG # BLD: 3.5 K/UL (ref 1.8–9.5)
NEUTS SEG NFR BLD: 52 % (ref 40–70)
PLATELET # BLD AUTO: 319 K/UL (ref 140–440)
RBC # BLD AUTO: 5.46 M/UL (ref 4.1–5.1)
WBC # BLD AUTO: 6.7 K/UL (ref 4.5–13)

## 2019-12-19 NOTE — PATIENT INSTRUCTIONS
Learning About Abnormal Lab Results  Your Care Instructions    Your lab test result is only one piece of information about your health. Your doctor considers many things when looking at a test result. These things may include your symptoms, age, weight, physical exam, and family history. That's why it's important to talk to your doctor. He or she can give you a clear sense of what your result means for you. Before you do, you may also find it helpful to learn a little about lab results in general.  What can help you understand your test result? Lab test results are expressed in different ways. A test result can be:  · Positive when something is present. One example is the hormone that is a sign of pregnancy. · Negative when something isn't present. An example is a negative strep test.  · Inside or outside of the reference range of what is most common for that test. A reference range is just a guide. It is set by testing large groups of healthy people. It's also possible for a test result to be false-positive or false-negative. · A false-positive result is one that appears to detect something when in fact it is not present. · A false-negative result is one that does not detect what is being tested for even though it is present. What if your result is different than the reference range? It is possible to have a result that is outside the reference range even though nothing is wrong with you. Your doctor may want to repeat the test or order another test to check. Sometimes certain things can affect your test results. Examples include:  · Pregnancy. · A medicine you are taking. · Fasting or eating just before a test.  · Smoking. · Being under stress. Making sense of your lab test involves more than just knowing the numbers. Your doctor can tell you what your test results mean for you and your health. Follow-up care is a key part of your treatment and safety.  Be sure to make and go to all appointments, and call your doctor if you are having problems. It's also a good idea to know your test results and keep a list of the medicines you take. Where can you learn more? Go to http://maxi-dominic.info/. Enter F140 in the search box to learn more about \"Learning About Abnormal Lab Results. \"  Current as of: March 28, 2019  Content Version: 12.2  © 3034-2702 [employer]. Care instructions adapted under license by [employer] (which disclaims liability or warranty for this information). If you have questions about a medical condition or this instruction, always ask your healthcare professional. [address] any warranty or liability for your use of this information.

## 2019-12-19 NOTE — PROGRESS NOTES
Hematology/Oncology  Progress Note    Name: Rhys Torrez  Date: 2019  : 1985    PCP: Fabian Serrano MD     Ms. Swathi Cho is a 37 y.o.  female who was seen for erythrocytosis. Current therapy: Active surveillance; therapeutic phlebotomy will be provided if the hematocrit exceeds 45%. Subjective:     Mrs. Swathi Cho is a 38-year-old -American woman who was previously evaluated for elevated RBC's. There was no abnormalities found in the work up. She is here today for a 3 month follow up . The patient reports that she is doing well. She has no physical concerns or complaints to report today. Patient states that she takes a oral Vitamin D weekly. Patient states that she lives an active lifestyle and follows a well balanced diet. She denies any know history of hematologic disorders. Past medical history, family history, and social history: these were reviewed and remains unchanged.     Past Medical History:   Diagnosis Date    Anemia     Hemorrhoids     Joint pain     Muscle pain      Past Surgical History:   Procedure Laterality Date    FLEXIBLE SIGMOIDOSCOPY N/A 2019    SIGMOIDOSCOPY FLEXIBLE performed by Baltazar Valdivia MD at [facility] HX GYN      iud inserted and removed    HX HYSTERECTOMY      HX OTHER SURGICAL  Lap in     HX TUBAL LIGATION       Social History     Socioeconomic History    Marital status: UNKNOWN     Spouse name: Not on file    Number of children: Not on file    Years of education: Not on file    Highest education level: Not on file   Occupational History    Not on file   Social Needs    Financial resource strain: Not on file    Food insecurity:     Worry: Not on file     Inability: Not on file    Transportation needs:     Medical: Not on file     Non-medical: Not on file   Tobacco Use    Smoking status: Never Smoker    Smokeless tobacco: Never Used   Substance and Sexual Activity    Alcohol use: No    Drug use: No    Sexual activity: Yes     Partners: Male   Lifestyle    Physical activity:     Days per week: Not on file     Minutes per session: Not on file    Stress: Not on file   Relationships    Social connections:     Talks on phone: Not on file     Gets together: Not on file     Attends Nondenominational service: Not on file     Active member of club or organization: Not on file     Attends meetings of clubs or organizations: Not on file     Relationship status: Not on file    Intimate partner violence:     Fear of current or ex partner: Not on file     Emotionally abused: Not on file     Physically abused: Not on file     Forced sexual activity: Not on file   Other Topics Concern    Not on file   Social History Narrative    Not on file     Family History   Problem Relation Age of Onset    Diabetes Mother     Hypertension Mother     Stroke Maternal Grandfather     Breast Cancer Paternal Aunt     Hypertension Father     Depression Father      Current Outpatient Medications   Medication Sig Dispense Refill    sertraline (ZOLOFT) 50 mg tablet Take 25 mg by mouth daily.  ergocalciferol (ERGOCALCIFEROL) 50,000 unit capsule Take 1 Cap by mouth every seven (7) days. 12 Cap 0    meloxicam (MOBIC) 15 mg tablet TAKE 1 TAB BY MOUTH DAILY AS NEEDED FOR PAIN. TAKE WITH FOOD  1    Cholecalciferol, Vitamin D3, (VITAMIN D3) 2,000 unit cap capsule Take  by mouth two (2) times a day.  oxycodone-acetaminophen (PERCOCET) 5-325 mg per tablet Take 1-2 tablets by mouth every six (6) hours as needed for Pain. 40 tablet 0    ibuprofen (MOTRIN) 600 mg tablet Take  by mouth. Indications: PAIN         Review of Systems  Constitutional: The patient has no acute distress or discomfort. HEENT: The patient denies recent head trauma, eye pain, blurred vision,  hearing deficit, oropharyngeal mucosal pain or lesions, and the patient denies throat pain or discomfort. Lymphatics:  The patient denies palpable peripheral lymphadenopathy. Hematologic: The patient denies having bruising, bleeding, or progressive fatigue. Respiratory: Patient denies having shortness of breath, cough, sputum production, fever, or dyspnea on exertion. Cardiovascular: The patient denies having leg pain, leg swelling, heart palpitations, chest permit, chest pain, or lightheadedness. The patient denies having dyspnea on exertion. Gastrointestinal: The patient denies having nausea, emesis, or diarrhea. The patient denies having any hematemesis or blood in the stool. Genitourinary: Patient denies having urinary urgency, frequency, or dysuria. The patient denies having blood in the urine. Psychological: The patient denies having symptoms of nervousness, anxiety, depression, or thoughts of harming himself some of this. Skin: Patient denies having skin rashes, skin, ulcerations, or unexplained itching or pruritus. Musculoskeletal: The patient denies having pain in the joints or bones. Objective:     Visit Vitals  Resp 16    5' 1\" (1.549 m)   [facility] 10/06/2014   BMI 25.70 kg/m²     ECOG PS=0, pain= 0/10  Physical Exam:   Gen. Appearance: The patient is in no acute distress. Skin: There is no bruise or rash. HEENT: The exam is unremarkable. Neck: Supple without lymphadenopathy or thyromegaly. Lungs: Clear to auscultation and percussion; there are no wheezes or rhonchi. Heart: Regular rate and rhythm; there are no murmurs, gallops, or rubs. Abdomen: Bowel sounds are present and normal.  There is no guarding, tenderness, or hepatosplenomegaly. Extremities: There is no clubbing, cyanosis, or edema. Neurologic: There are no focal neurologic deficits. Lymphatics: There is no palpable peripheral lymphadenopathy. Musculoskeletal: The patient has full range of motion at all joints. There is no evidence of joint deformity or effusions. There is no focal joint tenderness.   Psychological/psychiatric: There is no clinical evidence of anxiety, depression, or melancholy. Lab data:      Results for orders placed or performed during the hospital encounter of 12/19/19   CBC WITH 3 PART DIFF     Status: Abnormal   Result Value Ref Range Status    WBC 6.7 4.5 - 13.0 K/uL Final    RBC 5.46 (H) 4.10 - 5.10 M/uL Final    HGB 12.8 12.0 - 16.0 g/dL Final    HCT 41.4 36 - 48 % Final    MCV 75.8 (L) 78 - 102 FL Final    MCH 23.4 (L) 25.0 - 35.0 PG Final    MCHC 30.9 (L) 31 - 37 g/dL Final    RDW 14.2 11.5 - 14.5 % Final    PLATELET 421 736 - 729 K/uL Final    NEUTROPHILS 52 40 - 70 % Final    MIXED CELLS 8 0.1 - 17 % Final    LYMPHOCYTES 41 14 - 44 % Final    ABS. NEUTROPHILS 3.5 1.8 - 9.5 K/UL Final    ABS. MIXED CELLS 0.5 0.0 - 2.3 K/uL Final    ABS. LYMPHOCYTES 2.7 1.1 - 5.9 K/UL Final     Comment: Test performed at [address] [place] [address] or Outpatient [facility]fusion Center Location. Reviewed by Medical Director. DF AUTOMATED   Final           Assessment:     1. Neutrophilic leukocytosis          Plan:     Neutrophilic Leukocytosis: CBC from today, 12/19/2019, shows a WBC count of 6.7 with an absolute neutrophil count of 3.5 and an absolute leukocyte count of 2.7. The neutrophils represent 52% of the total WBC count. We will continue to monitor these values at 3-6 month intervals. A CMP will also be orderd. Erythrocytosis: Total RBC;s from today was 5.46. Her hemoglobin and hematocrit are normal at 12.8 g/dL and 41.4% respectively. Her MCV is currently 75.8. At this time an CMP, will be obtained. The patient was advised to avoid any iron supplements at this time. Return to clinic in 12 weeks. Orders Placed This Encounter    COMPLETE CBC & AUTO DIFF WBC    InHouse CBC ([employer])     Standing Status:   Future     Number of Occurrences:   1     Standing Expiration Date:   12/26/2019       Therese Borjas MD  12/19/2019       Please note: This document has been produced using voice recognition software.   Unrecognized errors in transcription may be present.

## 2019-12-20 LAB
A-G RATIO,AGRAT: 1.3 RATIO (ref 1.1–2.6)
ALBUMIN SERPL-MCNC: 4.4 G/DL (ref 3.5–5)
ALP SERPL-CCNC: 78 U/L (ref 25–115)
ALT SERPL-CCNC: 10 U/L (ref 5–40)
ANION GAP SERPL CALC-SCNC: 14 MMOL/L
AST SERPL W P-5'-P-CCNC: 10 U/L (ref 10–37)
BILIRUB SERPL-MCNC: 1 MG/DL (ref 0.2–1.2)
BUN SERPL-MCNC: 7 MG/DL (ref 6–22)
CALCIUM SERPL-MCNC: 9.4 MG/DL (ref 8.4–10.5)
CHLORIDE SERPL-SCNC: 101 MMOL/L (ref 98–110)
CO2 SERPL-SCNC: 24 MMOL/L (ref 20–32)
CREAT SERPL-MCNC: 0.8 MG/DL (ref 0.5–1.2)
FE % SATURATION,PSAT: 46 % (ref 20–50)
FERRITIN SERPL-MCNC: 176 NG/ML (ref 10–291)
GFRAA, 66117: >60
GFRNA, 66118: >60
GLOBULIN,GLOB: 3.3 G/DL (ref 2–4)
GLUCOSE SERPL-MCNC: 87 MG/DL (ref 70–99)
IRON,IRN: 127 MCG/DL (ref 30–160)
POTASSIUM SERPL-SCNC: 4.4 MMOL/L (ref 3.5–5.5)
PROT SERPL-MCNC: 7.7 G/DL (ref 6.4–8.3)
SODIUM SERPL-SCNC: 139 MMOL/L (ref 133–145)
TIBC,TIBC: 275 MCG/DL (ref 228–428)
UIBC SERPL-MCNC: 148 MCG/DL (ref 110–370)

## 2020-04-08 ENCOUNTER — HOSPITAL ENCOUNTER (OUTPATIENT)
Dept: ONCOLOGY | Age: 35
Discharge: HOME | End: 2020-04-08

## 2020-04-08 ENCOUNTER — OFFICE VISIT (OUTPATIENT)
Dept: ONCOLOGY | Age: 35
End: 2020-04-08

## 2020-04-08 VITALS
BODY MASS INDEX: 24.94 KG/M2 | HEART RATE: 88 BPM | TEMPERATURE: 99.5 F | OXYGEN SATURATION: 99 % | RESPIRATION RATE: 16 BRPM | SYSTOLIC BLOOD PRESSURE: 98 MMHG | HEIGHT: 61 IN | DIASTOLIC BLOOD PRESSURE: 66 MMHG

## 2020-04-08 DIAGNOSIS — D75.1 ERYTHROCYTOSIS: ICD-10-CM

## 2020-04-08 DIAGNOSIS — D72.9 NEUTROPHILIC LEUKOCYTOSIS: ICD-10-CM

## 2020-04-08 DIAGNOSIS — D72.9 NEUTROPHILIC LEUKOCYTOSIS: Primary | ICD-10-CM

## 2020-04-08 LAB
A-G RATIO,AGRAT: 1.5 RATIO (ref 1.1–2.6)
ALBUMIN SERPL-MCNC: 4 G/DL (ref 3.5–5)
ALP SERPL-CCNC: 64 U/L (ref 25–115)
ALT SERPL-CCNC: 29 U/L (ref 5–40)
ANION GAP SERPL CALC-SCNC: 12 MMOL/L
AST SERPL W P-5'-P-CCNC: 11 U/L (ref 10–37)
BASO+EOS+MONOS # BLD AUTO: 1.1 K/UL (ref 0–2.3)
BASO+EOS+MONOS NFR BLD AUTO: 9 % (ref 0.1–17)
BILIRUB SERPL-MCNC: 0.4 MG/DL (ref 0.2–1.2)
BUN SERPL-MCNC: 12 MG/DL (ref 6–22)
CALCIUM SERPL-MCNC: 9.3 MG/DL (ref 8.4–10.5)
CHLORIDE SERPL-SCNC: 100 MMOL/L (ref 98–110)
CO2 SERPL-SCNC: 25 MMOL/L (ref 20–32)
CREAT SERPL-MCNC: 0.8 MG/DL (ref 0.5–1.2)
DIFFERENTIAL METHOD BLD: ABNORMAL
ERYTHROCYTE [DISTWIDTH] IN BLOOD BY AUTOMATED COUNT: 14.7 % (ref 11.5–14.5)
GFRAA, 66117: >60
GFRNA, 66118: >60
GLOBULIN,GLOB: 2.6 G/DL (ref 2–4)
GLUCOSE SERPL-MCNC: 85 MG/DL (ref 70–99)
HCT VFR BLD AUTO: 41.1 % (ref 36–48)
HGB BLD-MCNC: 13.1 G/DL (ref 12–16)
LYMPHOCYTES # BLD: 4.2 K/UL (ref 1.1–5.9)
LYMPHOCYTES NFR BLD: 37 % (ref 14–44)
MCH RBC QN AUTO: 24.1 PG (ref 25–35)
MCHC RBC AUTO-ENTMCNC: 31.9 G/DL (ref 31–37)
MCV RBC AUTO: 75.6 FL (ref 78–102)
NEUTS SEG # BLD: 6 K/UL (ref 1.8–9.5)
NEUTS SEG NFR BLD: 54 % (ref 40–70)
PLATELET # BLD AUTO: 312 K/UL (ref 140–440)
POTASSIUM SERPL-SCNC: 4.3 MMOL/L (ref 3.5–5.5)
PROT SERPL-MCNC: 6.6 G/DL (ref 6.4–8.3)
RBC # BLD AUTO: 5.44 M/UL (ref 4.1–5.1)
SODIUM SERPL-SCNC: 137 MMOL/L (ref 133–145)
WBC # BLD AUTO: 11.3 K/UL (ref 4.5–13)

## 2020-04-08 NOTE — PATIENT INSTRUCTIONS
Learning About Abnormal Lab Results  Your Care Instructions    Your lab test result is only one piece of information about your health. Your doctor considers many things when looking at a test result. These things may include your symptoms, age, weight, physical exam, and family history. That's why it's important to talk to your doctor. He or she can give you a clear sense of what your result means for you. Before you do, you may also find it helpful to learn a little about lab results in general.  What can help you understand your test result? Lab test results are expressed in different ways. A test result can be:  · Positive when something is present. One example is the hormone that is a sign of pregnancy. · Negative when something isn't present. An example is a negative strep test.  · Inside or outside of the reference range of what is most common for that test. A reference range is just a guide. It is set by testing large groups of healthy people. It's also possible for a test result to be false-positive or false-negative. · A false-positive result is one that appears to detect something when in fact it is not present. · A false-negative result is one that does not detect what is being tested for even though it is present. What if your result is different than the reference range? It is possible to have a result that is outside the reference range even though nothing is wrong with you. Your doctor may want to repeat the test or order another test to check. Sometimes certain things can affect your test results. Examples include:  · Pregnancy. · A medicine you are taking. · Fasting or eating just before a test.  · Smoking. · Being under stress. Making sense of your lab test involves more than just knowing the numbers. Your doctor can tell you what your test results mean for you and your health. Follow-up care is a key part of your treatment and safety.  Be sure to make and go to all appointments, and call your doctor if you are having problems. It's also a good idea to know your test results and keep a list of the medicines you take. Where can you learn more? Go to http://maxi-dominic.info/  Enter F140 in the search box to learn more about \"Learning About Abnormal Lab Results. \"  Current as of: December 8, 2019Content Version: 12.4  © 9234-6045 Healthwise, Incorporated. Care instructions adapted under license by [employer] (which disclaims liability or warranty for this information). If you have questions about a medical condition or this instruction, always ask your healthcare professional. [address] any warranty or liability for your use of this information.

## 2020-04-09 LAB
ALBUMIN, 001488: 52 % (ref 46.6–62.6)
ALPHA-1-GLOBULIN, 001489: 2.7 % (ref 1.7–4.1)
ALPHA-2-GLOBULIN, 001490: 11.5 % (ref 5.9–13.5)
BETA GLOBULIN, 001491: 15.1 % (ref 10.9–18.9)
GAMMA GLOBULIN, 001492: 18.7 % (ref 11.6–24.4)
PE INTERPRETATION, 107352: NORMAL
PROT SERPL-MCNC: 6.6 G/DL (ref 6.4–8.3)

## 2020-04-22 ENCOUNTER — VIRTUAL VISIT (OUTPATIENT)
Dept: ONCOLOGY | Age: 35
End: 2020-04-22

## 2020-04-22 DIAGNOSIS — D75.1 ERYTHROCYTOSIS: ICD-10-CM

## 2020-04-22 DIAGNOSIS — D72.9 NEUTROPHILIC LEUKOCYTOSIS: Primary | ICD-10-CM

## 2020-04-22 NOTE — PROGRESS NOTES
Hematology/medical oncology progress note    4/22/2020  Chuck Rodrigez is a 38 y.o. female evaluated via telephone on 4/22/2020. YOB: 1985    PCP: Dr. Makayla Serrano    Diagnosis: Neutrophilic leukocytosis and erythrocytosis        Consent:  She and/or health care decision maker is aware that that she may receive a bill for this telephone service, depending on her insurance coverage, and has provided verbal consent to proceed: YES      Documentation:  I communicated with the patient and/or health care decision maker about her ongoing unexplained neutrophilic leukocytosis. I explained to the patient that the immunophenotyping profile, there was ordered on 4/8/2020, showed no evidence of immunophenotypic aberrancy in her leukocyte populations. Additionally on 4 8 her CBC revealed that her WBC count had normalized to 11.3, hemoglobin was 13.1 g/dL, hematocrit was 41.1%, and the platelet count was 271,432. Therefore, I explained to the patient that her elevated leukocyte ptosis was most likely a reactive process. Clearly there was no immunophenotypic aberrancy to suggest a leukemic process, lymphomatous process, lymphoproliferative disorder or myeloproliferative disorder. The patient had her questions answered to her satisfaction. Total time 25 minutes, greater than 50% of the time was in counseling and coordination of care. She will follow-up in the clinic in 8 to 12 weeks for updated lab results. I AFFIRM this is a Patient Initiated Episode with an Established Patient who has not had a related appointment within my department in the past 7 days or scheduled within the next 24 hours. Total Time: Total time 25 minutes, greater than 50% of the time was in counseling and coordination of care. Note: not billable if this call serves to triage the patient into an appointment for the relevant concern      Steve Storey. Stuart Feliciano MD, [address]

## 2020-06-18 ENCOUNTER — HOSPITAL ENCOUNTER (OUTPATIENT)
Dept: ONCOLOGY | Age: 35
Discharge: HOME | End: 2020-06-18

## 2020-06-18 ENCOUNTER — OFFICE VISIT (OUTPATIENT)
Dept: ONCOLOGY | Age: 35
End: 2020-06-18

## 2020-06-18 VITALS
OXYGEN SATURATION: 100 % | TEMPERATURE: 98.7 F | HEART RATE: 87 BPM | SYSTOLIC BLOOD PRESSURE: 111 MMHG | BODY MASS INDEX: 25.15 KG/M2 | DIASTOLIC BLOOD PRESSURE: 72 MMHG | WEIGHT: 133.2 LBS | HEIGHT: 61 IN

## 2020-06-18 DIAGNOSIS — D75.1 ERYTHROCYTOSIS: ICD-10-CM

## 2020-06-18 DIAGNOSIS — D72.9 NEUTROPHILIC LEUKOCYTOSIS: ICD-10-CM

## 2020-06-18 DIAGNOSIS — D72.9 NEUTROPHILIC LEUKOCYTOSIS: Primary | ICD-10-CM

## 2020-06-18 LAB
BASO+EOS+MONOS # BLD AUTO: 0.9 K/UL (ref 0–2.3)
BASO+EOS+MONOS NFR BLD AUTO: 10 % (ref 0.1–17)
DIFFERENTIAL METHOD BLD: ABNORMAL
ERYTHROCYTE [DISTWIDTH] IN BLOOD BY AUTOMATED COUNT: 14.1 % (ref 11.5–14.5)
HCT VFR BLD AUTO: 42.4 % (ref 36–48)
HGB BLD-MCNC: 13.1 G/DL (ref 12–16)
LYMPHOCYTES # BLD: 2.8 K/UL (ref 1.1–5.9)
LYMPHOCYTES NFR BLD: 29 % (ref 14–44)
MCH RBC QN AUTO: 23.2 PG (ref 25–35)
MCHC RBC AUTO-ENTMCNC: 30.9 G/DL (ref 31–37)
MCV RBC AUTO: 75 FL (ref 78–102)
NEUTS SEG # BLD: 5.7 K/UL (ref 1.8–9.5)
NEUTS SEG NFR BLD: 61 % (ref 40–70)
PLATELET # BLD AUTO: 289 K/UL (ref 140–440)
RBC # BLD AUTO: 5.65 M/UL (ref 4.1–5.1)
WBC # BLD AUTO: 9.4 K/UL (ref 4.5–13)

## 2020-06-18 NOTE — PATIENT INSTRUCTIONS
Polycythemia: Care Instructions Your Care Instructions Polycythemia (say \"paw-olya-sy-THEE-marialuisa-uh) is an abnormal increase in red blood cells. It happens when the tissue inside your bones (bone marrow) makes too much blood. It also can occur if your blood does not have enough liquid, or plasma. This can make the number of red blood cells seem higher than normal. The extra red blood cells make your blood thicker than normal. This may raise your risk for blood clots that can cause heart attacks or strokes. Clots can form in the deep veins of the body, a condition called deep vein thrombosis. Or, a clot can travel through the blood to a lung (a pulmonary embolism). Your doctor may treat you by taking out some of your blood (phlebotomy). The process is like donating blood. Your doctor may even recommend that you donate blood. You may take pills to stop your body from making red blood cells. You also will get treatment for any other conditions that may cause your body to make too many red blood cells. Follow-up care is a key part of your treatment and safety. Be sure to make and go to all appointments, and call your doctor if you are having problems. It's also a good idea to know your test results and keep a list of the medicines you take. How can you care for yourself at home? · Be safe with medicines. Take your medicines exactly as prescribed. Call your doctor if you think you are having a problem with your medicine. · Drink plenty of fluids, enough so that your urine is light yellow or clear like water, before and after you have blood removed. If you have kidney, heart, or liver disease and have to limit fluids, talk with your doctor before you increase the amount of fluids you drink. · Take it easy after you have had blood removed. Do not do vigorous exercise. · If your doctor recommends aspirin, take it exactly as prescribed. Call your doctor if you think you are having a problem with your medicine. · Do not smoke. Smoking increases the risk of blood clots and may reduce the amount of oxygen in your blood. If you need help quitting, talk to your doctor about stop-smoking programs and medicines. These can increase your chances of quitting for good. · Take an antihistamine, such as a nondrowsy one like loratadine (Claritin) or one that might make you sleepy like diphenhydramine (Benadryl), if your skin is itchy. Some people who have this condition have itching. · Wear medical alert jewelry that lists your clotting problem. You can buy this at most drugsAegis Petroleum Technologyes. When should you call for help? [id number] anytime you think you may need emergency care. For example, call if: 
· You have sudden chest pain and shortness of breath, or you cough up blood. · You have symptoms of a stroke. These may include: 
? Sudden numbness, tingling, weakness, or loss of movement in your face, arm, or leg, especially on only one side of your body. ? Sudden vision changes. ? Sudden trouble speaking. ? Sudden confusion or trouble understanding simple statements. ? Sudden problems with walking or balance. ? A sudden, severe headache that is different from past headaches. · You have symptoms of a heart attack. These may include: 
? Chest pain or pressure, or a strange feeling in the chest. 
? Sweating. ? Shortness of breath. ? Nausea or vomiting. ? Pain, pressure, or a strange feeling in the back, neck, jaw, or upper belly or in one or both shoulders or arms. ? Lightheadedness or sudden weakness. ? A fast or irregular heartbeat. After you call 911, the  may tell you to chew 1 adult-strength or 2 to 4 low-dose aspirin. Wait for an ambulance. Do not try to drive yourself. Call your doctor now or seek immediate medical care if: 
· You have signs of a blood clot, such as: 
? Pain in your calf, back of knee, thigh, or groin. ? Redness and swelling in your leg or groin. Watch closely for changes in your health, and be sure to contact your doctor if you have any problems. Where can you learn more? Go to http://maxi-dominic.info/ Enter E955 in the search box to learn more about \"Polycythemia: Care Instructions. \" Current as of: November 8, 2019               Content Version: 12.5 © 4014-1275 [employer]. Care instructions adapted under license by [employer] (which disclaims liability or warranty for this information). If you have questions about a medical condition or this instruction, always ask your healthcare professional. [address] any warranty or liability for your use of this information. Complete Blood Count (CBC): About This Test 
What is it? A complete blood count (CBC) is a blood test that gives important information about your blood cells, especially red blood cells, white blood cells, and platelets. Why is this test done? A CBC may be done as part of a regular physical exam. There are many other reasons that a doctor may want this blood test, including to: · Find the cause of symptoms such as fatigue, weakness, fever, bruising, or weight loss. · Check for anemia. · See how much blood has been lost if there is bleeding. · Diagnose polycythemia. · Check for an infection. · Diagnose diseases of the blood, such as leukemia. · Check how the body is dealing with some types of drug or radiation treatment. · Check how abnormal bleeding is affecting the blood cells and counts. · Screen for high and low values before a surgery. · See if there are too many or too few of certain types of cells. This may help find other conditions. For instance, too many eosinophils may be a sign of an allergy or asthma. A blood count can give valuable information about the general state of your health.  
How do you prepare for the test? 
 In general, there's nothing you have to do before this test, unless your doctor tells you to. How is the test done? A health professional uses a needle to take a blood sample, usually from the arm. What happens after the test? 
· You will probably be able to go home right away. · You can go back to your usual activities right away. Follow-up care is a key part of your treatment and safety. Be sure to make and go to all appointments, and call your doctor if you are having problems. It's also a good idea to keep a list of the medicines you take. Ask your doctor when you can expect to have your test results. Where can you learn more? Go to http://maxi-dominic.info/ Enter F337 in the search box to learn more about \"Complete Blood Count (CBC): About This Test.\" Current as of: December 9, 2019               Content Version: 12.5 © 1919-7447 Healthwise, Incorporated. Care instructions adapted under license by [employer] (which disclaims liability or warranty for this information). If you have questions about a medical condition or this instruction, always ask your healthcare professional. [address] any warranty or liability for your use of this information.

## 2020-06-18 NOTE — PROGRESS NOTES
Hematology/Oncology  Progress Note    Name: Mayur Almazan  Date: 2020  : 1985    PCP: Dolores Guerrero MD     Ms. Sammy Holt is a 38 y.o.  female who was seen for erythrocytosis. Current therapy: Active surveillance; therapeutic phlebotomy will be provided if the hematocrit exceeds 45%. Subjective:     Mrs. Sammy Holt is a 13-year-old -American woman who was previously evaluated for elevated RBC's to rule out polycythemia ion 2017. she was also found to have an elevation of her neutrophilic leukocytes. JA K2 mutation analysis for myeloproliferative disorder was negative. Additionally flow cytometry of her peripheral blood revealed no definitive immunophenotypic evidence of a myeloid neoplasia or lymphoproliferative disorder. Therefore the slight elevation in her erythrocytes and neutrophils appeared to be reactive process. There was no evidence of leukemia, lymphoma, malignancy on the flow cytometry. She had been evaluated for influenza and that was ruled out. Additionally she had a test for coronavirus and this was negative as well. She denied fever, chills, night sweat, unintentional weight loss, skin lumps or bumps, acute bleeding or bruising issues. Denied headache, acute vision change, dizziness, chest pain, worsen shortness of breath, palpitation, productive cough, nausea, vomiting, abdominal pain, altered bowel habits, dysuria, new bone pain or back pain, focal numbness or weakness. Independent with ADLs and IADLs. Past medical history, family history, and social history: these were reviewed and remains unchanged.     Past Medical History:   Diagnosis Date    Anemia     Hemorrhoids     Joint pain     Muscle pain      Past Surgical History:   Procedure Laterality Date    FLEXIBLE SIGMOIDOSCOPY N/A 2019    SIGMOIDOSCOPY FLEXIBLE performed by Rufus Nix [facility] at [facility] HX GYN      iud inserted and removed    HX HYSTERECTOMY      HX OTHER SURGICAL  Lap in 2000    HX TUBAL LIGATION       Social History     Socioeconomic History    Marital status: UNKNOWN     Spouse name: Not on file    Number of children: Not on file    Years of education: Not on file    Highest education level: Not on file   Occupational History    Not on file   Social Needs    Financial resource strain: Not on file    Food insecurity     Worry: Not on file     Inability: Not on file    Transportation needs     Medical: Not on file     Non-medical: Not on file   Tobacco Use    Smoking status: Never Smoker    Smokeless tobacco: Never Used   Substance and Sexual Activity    Alcohol use: No    Drug use: No    Sexual activity: Yes     Partners: Male   Lifestyle    Physical activity     Days per week: Not on file     Minutes per session: Not on file    Stress: Not on file   Relationships    Social connections     Talks on phone: Not on file     Gets together: Not on file     Attends Judaism service: Not on file     Active member of club or organization: Not on file     Attends meetings of clubs or organizations: Not on file     Relationship status: Not on file    Intimate partner violence     Fear of current or ex partner: Not on file     Emotionally abused: Not on file     Physically abused: Not on file     Forced sexual activity: Not on file   Other Topics Concern    Not on file   Social History Narrative    Not on file     Family History   Problem Relation Age of Onset    Diabetes Mother     Hypertension Mother     Stroke Maternal Grandfather     Breast Cancer Paternal Aunt     Hypertension Father     Depression Father      Current Outpatient Medications   Medication Sig Dispense Refill    sertraline (ZOLOFT) 50 mg tablet Take 25 mg by mouth daily.  ergocalciferol (ERGOCALCIFEROL) 50,000 unit capsule Take 1 Cap by mouth every seven (7) days. 12 Cap 0    meloxicam (MOBIC) 15 mg tablet TAKE 1 TAB BY MOUTH DAILY AS NEEDED FOR PAIN.  TAKE WITH FOOD  1    Cholecalciferol, Vitamin D3, (VITAMIN D3) 2,000 unit cap capsule Take  by mouth two (2) times a day.  oxycodone-acetaminophen (PERCOCET) 5-325 mg per tablet Take 1-2 tablets by mouth every six (6) hours as needed for Pain. 40 tablet 0    ibuprofen (MOTRIN) 600 mg tablet Take  by mouth. Indications: PAIN         Review of Systems  Constitutional: The patient has no acute distress or discomfort. HEENT: The patient denies recent head trauma, eye pain, blurred vision,  hearing deficit, oropharyngeal mucosal pain or lesions, and the patient denies throat pain or discomfort. Lymphatics: The patient denies palpable peripheral lymphadenopathy. Hematologic: The patient denies having bruising, bleeding, or progressive fatigue. Respiratory: Patient denies having shortness of breath, cough, sputum production, fever, or dyspnea on exertion. Cardiovascular: The patient denies having leg pain, leg swelling, heart palpitations, chest permit, chest pain, or lightheadedness. The patient denies having dyspnea on exertion. Gastrointestinal: The patient denies having nausea, emesis, or diarrhea. The patient denies having any hematemesis or blood in the stool. Genitourinary: Patient denies having urinary urgency, frequency, or dysuria. The patient denies having blood in the urine. Psychological: The patient denies having symptoms of nervousness, anxiety, depression, or thoughts of harming himself some of this. Skin: Patient denies having skin rashes, skin, ulcerations, or unexplained itching or pruritus. Musculoskeletal: The patient denies having pain in the joints or bones. Objective:     Visit Vitals  /72   Pulse 87   Temp 98.7 °F (37.1 °C)   Ht 5' 1\" (1.549 m)   Wt 60.4 kg (133 lb 3.2 oz)   LMP 10/06/2014   SpO2 100%   BMI 25.17 kg/m²     ECOG PS=0, pain= 0/10  Physical Exam:   Gen. Appearance: The patient is in no acute distress. Skin: There is no bruise or rash. HEENT: The exam is unremarkable.   Neck: Supple without lymphadenopathy or thyromegaly. Lungs: Clear to auscultation and percussion; there are no wheezes or rhonchi. Heart: Regular rate and rhythm; there are no murmurs, gallops, or rubs. Abdomen: Bowel sounds are present and normal.  There is no guarding, tenderness, or hepatosplenomegaly. Extremities: There is no clubbing, cyanosis, or edema. Neurologic: There are no focal neurologic deficits. Lymphatics: There is no palpable peripheral lymphadenopathy. Musculoskeletal: The patient has full range of motion at all joints. There is no evidence of joint deformity or effusions. There is no focal joint tenderness. Psychological/psychiatric: There is no clinical evidence of anxiety, depression, or melancholy. Lab data:      Results for orders placed or performed during the hospital encounter of 06/18/20   CBC WITH 3 PART DIFF     Status: Abnormal   Result Value Ref Range Status    WBC 9.4 4.5 - 13.0 K/uL Final    RBC 5.65 (H) 4.10 - 5.10 M/uL Final    HGB 13.1 12.0 - 16 g/dL Final    HCT 42.4 36 - 48 % Final    MCV 75.0 (L) 78 - 102 FL Final    MCH 23.2 (L) 25.0 - 35.0 PG Final    MCHC 30.9 (L) 31 - 37 g/dL Final    RDW 14.1 11.5 - 14.5 % Final    PLATELET 199 256 - 974 K/uL Final    NEUTROPHILS 61 40 - 70 % Final    MIXED CELLS 10 0.1 - 17 % Final    LYMPHOCYTES 29 14 - 44 % Final    ABS. NEUTROPHILS 5.7 1.8 - 9.5 K/UL Final    ABS. MIXED CELLS 0.9 0.0 - 2.3 K/uL Final    ABS. LYMPHOCYTES 2.8 1.1 - 5.9 K/UL Final     Comment: Test performed at [address] [place] [address] or [facility]patient [facility] Location. Reviewed by Medical Director. DF AUTOMATED   Final           Assessment:     1. Neutrophilic leukocytosis    2. Erythrocytosis          Plan:     Neutrophilic Leukocytosis: CBC from today, shows that her WBC count is now back in the normal range at 9.4, the hemoglobin is 13.1 g/dL, hematocrit is 42.4%, and the platelet count is 820,084.  the immunophenotyping profile, on  4/8/2020, showed no evidence of immunophenotypic aberrancy in her leukocyte populations. Therefore her elevated leukocytosis was most likely a reactive process. Clearly there was no immunophenotypic aberrancy to suggest a leukemic process, lymphomatous process, lymphoproliferative disorder or myeloproliferative disorder at the time that her WBC count was 19,000 versus a purely reactive process. Erythrocytosis: Total RBC from today was 5.65. Her hemoglobin and hematocrit are normal at 13.1 g/dL and 42.4% respectively. Her MCV is currently 75.0. At this time an CMP, will be obtained. the iron profile and ferritin levels. Return to clinic in 12 weeks. Orders Placed This Encounter    COMPLETE CBC & AUTO DIFF WBC    InHouse CBC ([employer])     Standing Status:   Future     Number of Occurrences:   1     Standing Expiration Date:   8/60/9540    METABOLIC PANEL, COMPREHENSIVE     Standing Status:   Future     Number of Occurrences:   1     Standing Expiration Date:   6/19/2021    BCR-ABL1, PCR, QT     Standing Status:   Future     Number of Occurrences:   1     Standing Expiration Date:   6/19/2021    IRON PROFILE     Standing Status:   Future     Standing Expiration Date:   6/18/2021    FERRITIN     Standing Status:   Future     Standing Expiration Date:   6/18/2021       Julio Camacho NP  6/18/2020       Please note: This document has been produced using voice recognition software. Unrecognized errors in transcription may be present.

## 2020-09-16 DIAGNOSIS — D72.9 NEUTROPHILIC LEUKOCYTOSIS: Primary | ICD-10-CM

## 2020-09-16 DIAGNOSIS — D75.1 ERYTHROCYTOSIS: ICD-10-CM

## 2020-09-23 ENCOUNTER — OFFICE VISIT (OUTPATIENT)
Dept: ONCOLOGY | Age: 35
End: 2020-09-23
Payer: MEDICAID

## 2020-09-23 VITALS
WEIGHT: 143 LBS | BODY MASS INDEX: 27.02 KG/M2 | RESPIRATION RATE: 16 BRPM | SYSTOLIC BLOOD PRESSURE: 115 MMHG | OXYGEN SATURATION: 98 % | HEART RATE: 85 BPM | DIASTOLIC BLOOD PRESSURE: 72 MMHG

## 2020-09-23 DIAGNOSIS — D75.1 ERYTHROCYTOSIS: Primary | ICD-10-CM

## 2020-09-23 DIAGNOSIS — D72.9 NEUTROPHILIC LEUKOCYTOSIS: ICD-10-CM

## 2020-09-23 PROBLEM — J20.9 ACUTE BRONCHITIS: Status: ACTIVE | Noted: 2020-04-01

## 2020-09-23 PROCEDURE — 99214 OFFICE O/P EST MOD 30 MIN: CPT | Performed by: INTERNAL MEDICINE

## 2020-09-23 RX ORDER — ALBUTEROL SULFATE 90 UG/1
AEROSOL, METERED RESPIRATORY (INHALATION)
COMMUNITY
Start: 2020-06-30

## 2020-09-23 RX ORDER — LIDOCAINE 50 MG/G
1 PATCH TOPICAL EVERY 24 HOURS
COMMUNITY
Start: 2020-09-17 | End: 2021-02-09

## 2020-09-23 RX ORDER — NAPROXEN 500 MG/1
500 TABLET ORAL
COMMUNITY
Start: 2020-09-16 | End: 2020-11-25 | Stop reason: SDUPTHER

## 2020-09-23 RX ORDER — BUDESONIDE AND FORMOTEROL FUMARATE DIHYDRATE 80; 4.5 UG/1; UG/1
AEROSOL RESPIRATORY (INHALATION)
COMMUNITY
Start: 2020-07-10

## 2020-09-23 RX ORDER — MONTELUKAST SODIUM 10 MG/1
TABLET ORAL
COMMUNITY
Start: 2020-06-30

## 2020-09-23 RX ORDER — METHOCARBAMOL 750 MG/1
750 TABLET, FILM COATED ORAL
COMMUNITY
Start: 2020-09-16

## 2020-09-23 RX ORDER — METHYLPREDNISOLONE 4 MG/1
TABLET ORAL
COMMUNITY
Start: 2020-09-17 | End: 2020-11-25 | Stop reason: ALTCHOICE
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
## 2020-11-10 ENCOUNTER — OFFICE VISIT (OUTPATIENT)
Dept: ORTHOPEDIC SURGERY | Age: 35
End: 2020-11-10
Payer: MEDICAID

## 2020-11-10 VITALS
RESPIRATION RATE: 16 BRPM | SYSTOLIC BLOOD PRESSURE: 113 MMHG | DIASTOLIC BLOOD PRESSURE: 77 MMHG | OXYGEN SATURATION: 100 % | TEMPERATURE: 98.4 F | HEIGHT: 61 IN | BODY MASS INDEX: 27.56 KG/M2 | WEIGHT: 146 LBS | HEART RATE: 80 BPM

## 2020-11-10 DIAGNOSIS — M79.18 MYOFASCIAL PAIN: ICD-10-CM

## 2020-11-10 DIAGNOSIS — M54.2 CERVICAL PAIN: ICD-10-CM

## 2020-11-10 DIAGNOSIS — M62.838 MUSCLE SPASM: ICD-10-CM

## 2020-11-10 DIAGNOSIS — R20.0 RIGHT ARM NUMBNESS: Primary | ICD-10-CM

## 2020-11-10 PROCEDURE — 99203 OFFICE O/P NEW LOW 30 MIN: CPT | Performed by: PHYSICAL MEDICINE & REHABILITATION

## 2020-11-10 RX ORDER — NAPROXEN 500 MG/1
500 TABLET ORAL 2 TIMES DAILY WITH MEALS
Qty: 60 TAB | Refills: 1 | Status: SHIPPED | OUTPATIENT
Start: 2020-11-10

## 2020-11-10 RX ORDER — METHYLPREDNISOLONE 4 MG/1
TABLET ORAL
Qty: 1 DOSE PACK | Refills: 0 | Status: SHIPPED | OUTPATIENT
Start: 2020-11-10 | End: 2020-11-25 | Stop reason: ALTCHOICE

## 2020-11-10 RX ORDER — GABAPENTIN 300 MG/1
CAPSULE ORAL
Qty: 90 CAP | Refills: 1 | Status: SHIPPED | OUTPATIENT
Start: 2020-11-10 | End: 2021-01-05

## 2020-11-10 NOTE — PATIENT INSTRUCTIONS
Neck Arthritis: Exercises Introduction Here are some examples of exercises for you to try. The exercises may be suggested for a condition or for rehabilitation. Start each exercise slowly. Ease off the exercises if you start to have pain. You will be told when to start these exercises and which ones will work best for you. How to do the exercises Neck stretches to the side 1. This stretch works best if you keep your shoulder down as you lean away from it. To help you remember to do this, start by relaxing your shoulders and lightly holding on to your thighs or your chair. 2. Tilt your head toward your shoulder and hold for 15 to 30 seconds. Let the weight of your head stretch your muscles. 3. Repeat 2 to 4 times toward each shoulder. Chin tuck 1. Lie on the floor with a rolled-up towel under your neck. Your head should be touching the floor. 2. Slowly bring your chin toward your chest. 
3. Hold for a count of 6, and then relax for up to 10 seconds. 4. Repeat 8 to 12 times. Active cervical rotation 1. Sit in a firm chair, or stand up straight. 2. Keeping your chin level, turn your head to the right, and hold for 15 to 30 seconds. 3. Turn your head to the left and hold for 15 to 30 seconds. 4. Repeat 2 to 4 times to each side. Shoulder blade squeeze 1. While standing, squeeze your shoulder blades together. 2. Do not raise your shoulders up as you are squeezing. 3. Hold for 6 seconds. 4. Repeat 8 to 12 times. Shoulder rolls 1. Sit comfortably with your feet shoulder-width apart. You can also do this exercise standing up. 2. Roll your shoulders up, then back, and then down in a smooth, circular motion. 3. Repeat 2 to 4 times. Follow-up care is a key part of your treatment and safety. Be sure to make and go to all appointments, and call your doctor if you are having problems. It's also a good idea to know your test results and keep a list of the medicines you take. Where can you learn more? Go to http://www.gray.com/ Enter N871 in the search box to learn more about \"Neck Arthritis: Exercises. \" Current as of: March 2, 2020               Content Version: 12.6 © 1013-7569 [employer]. Care instructions adapted under license by [employer] (which disclaims liability or warranty for this information). If you have questions about a medical condition or this instruction, always ask your healthcare professional. [place] [address] any warranty or liability for your use of this information.

## 2020-11-10 NOTE — LETTER
11/13/20 Patient: Acquanetta Blizzard YOB: 1985 Date of Visit: 11/10/2020 Sarah Fink MD 
[address] [id number] [place] [address] VIA Facsimile: [phone] Dear Sarah Fink MD, Thank you for referring Ms. Coy Camargo to [facility] for evaluation. My notes for this consultation are attached. If you have questions, please do not hesitate to call me. I look forward to following your patient along with you. Sincerely, Osmel Velasquez MD

## 2020-11-10 NOTE — PROGRESS NOTES
[facility]  [address] [place] [address], [facility] [address]  Phone: [phone]  Fax: [phone]    NEW PATIENT  Pt's YOB: 1985    ASSESSMENT   Diagnoses and all orders for this visit:    1. Right arm numbness  -     REFERRAL TO PHYSICAL THERAPY  -     EMG ONE EXTREMITY UPPER RT; Future  -     gabapentin (Neurontin) 300 mg capsule; Take 1 cap by mouth in the morning and 2 at night as directed. 2. Muscle spasm  -     REFERRAL TO PHYSICAL THERAPY    3. Cervical pain  -     REFERRAL TO PHYSICAL THERAPY  -     naproxen (NAPROSYN) 500 mg tablet; Take 1 Tab by mouth two (2) times daily (with meals). -     methylPREDNISolone (MEDROL DOSEPACK) 4 mg tablet; Per dose pack instructions    4. Myofascial pain  -     REFERRAL TO PHYSICAL THERAPY         IMPRESSION AND PLAN:  Melissa Phillips is a 38 y.o. right hand dominant female with history of neck pain. She complains of pain in the neck that radiates down the right arm into the 4th and 5th digits since 9/2020.    1) Pt was given information on cervical arthritis exercises. 2) She was referred to cervical physical therapy. 3) Pt was prescribed a Medrol Dosepak. 4) She received a refill of Naprosyn 500 mg 1 tab BID prn pain. 5) A right upper extremity EMG was ordered to assess for radiculopathy and entrapment. 6) Pt was prescribed Neurontin 300 mg 1 cap QAM and 2 caps QHS, tapering up as directed. 7) Ms. Byron Graves has a reminder for a \"due or due soon\" health maintenance. I have asked that she contact her primary care provider, Megan Ramirez MD, for follow-up on this health maintenance. 8)  demonstrated consistency with prescribing. Follow-up and Dispositions    · Return in about 4 weeks (around 12/8/2020) for PT follow up, Medication follow up, Diagnostic Test follow up.            HISTORY OF PRESENT ILLNESS:  Melissa Phillips is a 38 y.o. right hand dominant female with history of neck pain. Pt presents to the office today as a new patient. She complains of pain in the neck that radiates down the right arm into the 4th and 5th digits since 9/2020. She reports increased pain when laying on her right side and she admits to stiffness in the neck, particularly when driving. Pt denies any inciting injuries and notes that she woke up around 1-2 AM with excruciating pain. She notes that she was unable to move the right arm and then went to the ED at [facility]. While at the ED x-rays were obtained and she was referred to orthopedics for evaluation of the right shoulder. Pt then followed up with her PCP, Richie Flores MD, and was referred to The [address] [place] [address]. Pt denies any recent physical therapy. She also denies any previous cervical or lumbar surgeries, left radicular symptoms, weakness in the arms, or change in activity prior to the onset of her pain. Pt notes temporary relief when taking Naprosyn, Robaxin, and a Medrol Dosepak. Pt at this time desires to proceed with physical therapy and medication evaluation. Of note, she is a nonsmoker.      Pain Scale: 4/10     PCP: Richie Flores MD    Past Medical History:   Diagnosis Date    Anemia     Hemorrhoids     Joint pain     Muscle pain         Social History     Socioeconomic History    Marital status: UNKNOWN     Spouse name: Not on file    Number of children: Not on file    Years of education: Not on file    Highest education level: Not on file   Occupational History    Not on file   Social Needs    Financial resource strain: Not on file    Food insecurity     Worry: Not on file     Inability: Not on file    Transportation needs     Medical: Not on file     Non-medical: Not on file   Tobacco Use    Smoking status: Never Smoker    Smokeless tobacco: Never Used   Substance and Sexual Activity    Alcohol use: No    Drug use: No    Sexual activity: Yes     Partners: Male   Lifestyle    Physical activity     Days per week: Not on file     Minutes per session: Not on file    Stress: Not on file   Relationships    Social connections     Talks on phone: Not on file     Gets together: Not on file     Attends Mosque service: Not on file     Active member of club or organization: Not on file     Attends meetings of clubs or organizations: Not on file     Relationship status: Not on file    Intimate partner violence     Fear of current or ex partner: Not on file     Emotionally abused: Not on file     Physically abused: Not on file     Forced sexual activity: Not on file   Other Topics Concern    Not on file   Social History Narrative    Not on file       Current Outpatient Medications   Medication Sig Dispense Refill    naproxen (NAPROSYN) 500 mg tablet Take 1 Tab by mouth two (2) times daily (with meals). 60 Tab 1    methylPREDNISolone (MEDROL DOSEPACK) 4 mg tablet Per dose pack instructions 1 Dose Pack 0    gabapentin (Neurontin) 300 mg capsule Take 1 cap by mouth in the morning and 2 at night as directed. 90 Cap 1    albuterol (PROVENTIL HFA, VENTOLIN HFA, PROAIR HFA) 90 mcg/actuation inhaler PLEASE SEE ATTACHED FOR DETAILED DIRECTIONS      Symbicort 80-4.5 mcg/actuation HFAA INHALE 1 PUFF BY MOUTH TWICE A DAY RINSE MOUTH AFTER USE      lidocaine (LIDODERM) 5 % 1 Patch by TransDERmal route.  methocarbamoL (ROBAXIN) 750 mg tablet Take 750 mg by mouth every four (4) hours as needed.  methylPREDNISolone (MEDROL DOSEPACK) 4 mg tablet       montelukast (SINGULAIR) 10 mg tablet TAKE 1 TABLET BY MOUTH EVERYDAY AT BEDTIME      naproxen (NAPROSYN) 500 mg tablet Take 500 mg by mouth two (2) times daily as needed.  sertraline (ZOLOFT) 50 mg tablet Take 25 mg by mouth daily.  ergocalciferol (ERGOCALCIFEROL) 50,000 unit capsule Take 1 Cap by mouth every seven (7) days. 12 Cap 0    meloxicam (MOBIC) 15 mg tablet TAKE 1 TAB BY MOUTH DAILY AS NEEDED FOR PAIN.  TAKE WITH FOOD  1    Cholecalciferol, Vitamin D3, (VITAMIN D3) 2,000 unit cap capsule Take  by mouth two (2) times a day.  oxycodone-acetaminophen (PERCOCET) 5-325 mg per tablet Take 1-2 tablets by mouth every six (6) hours as needed for Pain. 40 tablet 0    ibuprofen (MOTRIN) 600 mg tablet Take  by mouth. Indications: PAIN         No Known Allergies    REVIEW OF SYSTEMS    Constitutional: Negative for fever, chills, or weight change. Respiratory: Negative for cough or shortness of breath. Cardiovascular: Negative for chest pain or palpitations. Gastrointestinal: Negative for acid reflux, change in bowel habits, or constipation. Genitourinary: Negative for dysuria and flank pain. Musculoskeletal: Positive for cervical pain. Skin: Negative for rash. Neurological: Negative for headaches, dizziness, positive for numbness. Endo/Heme/Allergies: Negative for increased bruising. Psychiatric/Behavioral: Positive for difficulty with sleep. As per HPI    PHYSICAL EXAMINATION  Visit Vitals  /77 (BP 1 Location: Right arm, BP Patient Position: Sitting)   Pulse 80   Temp 98.4 °F (36.9 °C) (Skin)   Resp 16   Ht 5' 1\" (1.549 m)   Wt 146 lb (66.2 kg)   LMP 10/06/2014   SpO2 100%   BMI 27.59 kg/m²       Constitutional: Awake, alert, and in no acute distress. HEENT: Normocephalic. Atraumatic. Oropharynx is moist and clear. PERRL. EOMI. Sclerae are nonicteric  Cardiovascular: Regular rate and rhythm  Lungs: Clear to auscultation bilaterally  Abdomen: Soft and nontender. Bowel sounds are present  Neurological: 1+ symmetrical DTRs in the upper extremities. 1+ symmetrical DTRs in the lower extremities. Sensation to light touch is intact. Negative Suarez's sign bilaterally. Skin: warm, dry, and intact. Musculoskeletal: Tight across the upper trapezius bilaterally with scattered trigger points. Decreased range of motion with side to side cervical flexion. No pain with extension, axial loading, or forward flexion.  No pain with internal or external rotation of her hips. Negative straight leg raise bilaterally. No pain with heel or toe walking. No difficulty with the single leg stance bilaterally. Biceps  Triceps Deltoids Wrist Ext Wrist Flex Hand Intrin   Right +4/5 +4/5 +4/5 +4/5 +4/5 +4/5   Left +4/5 +4/5 +4/5 +4/5 +4/5 +4/5      Hip Flex  Quads Hamstrings Ankle DF EHL Ankle PF   Right +4/5 +4/5 +4/5 +4/5 +4/5 +4/5   Left +4/5 +4/5 +4/5 +4/5 +4/5 +4/5     IMAGING:    Cervical spine MRI from 10/1/2020 was personally reviewed with the patient and demonstrated:  FINDINGS:     Postoperative changes: None. Alignment: Straightening of upper cervical lordotic curvature. .    Vertebral body heights: Normal.    Marrow signal: Normal.    Cord: Normal.    Cerebellar tonsils: No Chiari 1 malformation. Soft tissues: Normal.     Correlation of axial and sagittal data through the disc levels:    -C2-3: No significant disc pathology. Facet joints appear normal..No spinal canal or foraminal stenosis. -C3-4: No significant disc pathology. Facet joints appear normal..No spinal canal or foraminal stenosis. -C4-5: No significant disc pathology. Facet joints appear normal..No spinal canal or foraminal stenosis. -C5-6: Disc height is mildly diminished with slight disc bulging. Facet joints appear normal.. No spinal canal or foraminal stenosis. -C6-7: No significant disc pathology. Facet joints appear normal..No spinal canal or foraminal stenosis. -C7-T1: No significant disc pathology. Facet joints appear normal.. The small ossified body projecting in the C7-T1 superior left neural foramen on the prior radiographs is difficult to visualize on the MR images. It is possibly related to the facet on series 3 image 4. No significant spinal canal or foraminal stenosis. IMPRESSION  No large disc herniation, cord compression or cord signal abnormality. No foraminal stenosis or nerve root compression seen.  Previously reported ossified density in the C7-T1 left neural foramen difficult to visualize as discussed above but of doubtful clinical significance given the patient's reported right side arm pain and weakness. Cervical spine x-rays from 9/18/2020 were personally reviewed with the patient and demonstrated:  FINDINGS:    VERTEBRAE AND ALIGNMENT: No fracture or subluxation is seen. FACET JOINTS AND FORAMINA: Calcific opacity is seen extending from the left C7-T1 facet joint with moderate narrowing of the left foramen as seen on the oblique view    PARASPINOUS SOFT TISSUES: Unremarkable. ADDITIONAL: Radiolucency is seen surrounding a left mandibular first molar, suggesting periapical abscess. _______________    IMPRESSION    No fracture or subluxation. Unusual calcific opacity narrowing the left C7-T1 foramen superiorly, suspect elongated facet osteophyte or less likely partially calcified synovial cyst. Consider follow-up dedicated MR cervical spine especially if left C8 radiculopathy symptoms. Findings suspicious for left mandibular first molar periapical abscess. Right shoulder x-rays from 9/16/2020 were personally reviewed with the patient and demonstrated:  FINDINGS:    BONES: Intact and normally aligned. SOFT TISSUES: Unremarkable.    _______________    IMPRESSION    No significant abnormality. Written by Mechelle Mcginnis, as dictated by Ninfa Gonzalez MD.  I, Dr. Ninfa Gonzalez confirm that all documentation is accurate.

## 2020-11-10 NOTE — LETTER
NOTIFICATION RETURN TO WORK / SCHOOL 
 
11/10/2020 2:35 PM 
 
Ms. Nehemiah Manuel [address] [facility] [address] To Whom It May Concern: 
 
Nehemiah Manuel is currently under the care of [facility]. She was here today for an office visit. If there are questions or concerns please have the patient contact our office. Sincerely, Aditya Jimenes LPN

## 2020-11-11 DIAGNOSIS — R20.0 RIGHT ARM NUMBNESS: ICD-10-CM

## 2020-11-23 ENCOUNTER — HOSPITAL ENCOUNTER (OUTPATIENT)
Dept: PHYSICAL THERAPY | Age: 35
Discharge: HOME | End: 2020-11-23
Payer: MEDICAID

## 2020-11-23 PROCEDURE — 97161 PT EVAL LOW COMPLEX 20 MIN: CPT

## 2020-11-23 NOTE — PROGRESS NOTES
[address] [facility] PHYSICAL THERAPY   [facility] [address], [address],[place], [address] [facility] [address] - Phone: [phone]  Fax: [phone] / [address] [facility]  Patient Name: Rory Rebollar : 1985   Medical   Diagnosis: Neck pain [M54.2] Treatment Diagnosis: Neck pain [M54.2]   Onset Date: 2020     Referral Source: Jr Toribio MD Start of Care [facility]): 2020   Prior Hospitalization: See medical history Provider #: [id number]   Prior Level of Function: I in ADLs/functional ADLs, able to don bra behind back, lift paper at work, reach overhead, look over shoulders while driving   Comorbidities: Hysterectomy, asthma   Medications: Verified on Patient Summary List   The Plan of Care and following information is based on the information from the initial evaluation.   ========================================================================  Assessment / key information: Patient is a 38 y.o. female who presents to [facility] with a diagnosis of Neck pain [M54.2]. Patient is her own historian. Occupation: desk work. Pt presents with c/o posterior R neck pain and numbness which radiates distally along the ulnar nerve distribution into the 4th and 5th digits. Unknown HARVEY. Heat helps decrease pain and she has received a cortisone injection without any relief of s/s. Unremarkable MRI/xrays on the C-S and the pt is going for an EMG this Wednesday. The pain/numbness are constant and she is unable to identify any specific aggravating factors at this time. Sleeping is problematic as is reaching overhead, behind back, turning her head R>L, looking down, and lifting activities. No red flags at this time. Myotomes/dermatomes grossly intact.  Current Deficits include: pain, edema, decreased mobility, decreased strength, and decreased postural awareness with resulting limitations in ADL's and in functional abilities. Patient will benefit from a comprehensive POC/HEP to address impairments and restore function in order to return to prior level of function and prevent secondary impairments. Impairments are as follows: *exam limited to pain levels*  Pain: current pain level 5/10, pain level at worst 10/10, and pain level at best 5/10 TTP along lower R C-S paraspinals  Posture cubital valgus, forward head/rounded shoulders, mild R rib hump and protracted scap R>L Observations guarding  AROM/PROM C-S  Active Movements:   ROM  AROM Comments:pain, area   Forward flexion:  WFL pain   Extension 20 pain   SB right  WFL pain   SB left  WFL pain   Rotation right 55 pain   Rotation left 65    R GH joint AROM/PROM flex 90/150 P! abd 140/170 P! ER 50/70 P! IR 50/60 P! Strength R GH flex/abd 3-/5 ER/IR 4-/5  Special Tests + R reverse spurlings, repetitive flex/ext/side bending testing inconclusive as pain with all AROM/PROM, + ULTT for reduced ulnar nn mobility RUE, + tinels for ulnar nerve on the RUE, negative empty can, hypomobility at CT junction  Functional Tests unable to reach overhead, look over R shoulder without pain  Functional Deficits include: see above. Patient's FOTO score was a 58/100 indicating decreased function.  Patient will benefit from a POC addressing such impairments and limitations in order to improve quality of life and return to PLOF.    ========================================================================  Eval Complexity: History: MEDIUM  Complexity : 1-2 comorbidities / personal factors will impact the outcome/ POC Exam:MEDIUM Complexity : 3 Standardized tests and measures addressing body structure, function, activity limitation and / or participation in recreation  Presentation: LOW Complexity : Stable, uncomplicated  Clinical Decision Making:MEDIUM Complexity : FOTO score of 26-74Overall Complexity:LOW   Problem List: pain affecting function, decrease ROM, decrease strength, decrease ADL/ functional abilitiies, decrease activity tolerance, decrease flexibility/ joint mobility and decrease transfer abilities   Treatment Plan may include any combination of the following: Therapeutic exercise, Therapeutic activities, Neuromuscular re-education, Physical agent/modality, Manual therapy and Patient education  Patient / Family readiness to learn indicated by: asking questions  Persons(s) to be included in education: patient (P)  Barriers to Learning/Limitations: None  Measures taken: A HEP was initiated to assist with POC in restoring function   Patient Goal (s): \"feel better\"   Patient self reported health status: good  Rehabilitation Potential: excellent   Short Term Goals: To be accomplished in  2  treatments:  1. Pt will be compliant with HEP for symptom management at home and independent in such for self management at discharge.  Long Term Goals: To be accomplished in  8-12  treatments:  1. Pt will demonstrate an increased FOTO score to 72/100 in order to improve function  2. Pt will report a +4 on GROC in order to assist with pain free ADLs/IADLs  3. Pt will demonstrate increased pain free AROM into R cervical rotation to >/= 65 deg in order to more easily look over shoulder while driving  4. Pt will test negative for ULTT on the ulnar nerve on 2 consecutive occasions in order to improve neurodynamic mobility and assist in a return to function    Frequency / Duration:     Patient to be seen  2  times per week for 8-12  treatments:  Patient / Caregiver education and instruction: exercises    Therapist Signature: Dimitri Pyle PT Date: 89/21/2594   Certification Period:  Time: 7:52 AM   ========================================================================  I certify that the above Physical Therapy Services are being furnished while the patient is under my care. I agree with the treatment plan and certify that this therapy is necessary.   Physician Signature:        Date:       Time:   Please sign and return to [facility], [facility]. or you may fax the signed copy to [phone]. Thank you. MEDICAID  To ensure your patient receives the highest quality care and to avoid disruption in therapy please sign and return this plan of care within 21 days. Per Medicaid guidelines if the plan of care is not received within 21 days the patient's care must be put on hold until signed.

## 2020-11-23 NOTE — PROGRESS NOTES
PHYSICAL THERAPY - DAILY TREATMENT NOTE    Patient Name: Justine Mehta        Date: 2020  : 1985   yes Patient  Verified  Visit #:   1     Insurance: Payor: Arnie Young / Plan: Melchor Poole / Product Type: Managed Care Medicaid /      In time: 800 Out time: 840   Total Treatment Time: 40     Medicare/BCBS Time Tracking (below)   Total Timed Codes (min):   1:1 Treatment Time:       TREATMENT AREA =  Neck pain [M54.2]    SUBJECTIVE  Pain Level (on 0 to 10 scale):  5  / 10   Medication Changes/New allergies or changes in medical history, any new surgeries or procedures?    no  If yes, update Summary List   Subjective Functional Status/Changes:  []  No changes reported   See Eval for subjective information and c/o. OBJECTIVE    5 NC min Therapeutic Exercise:  [x]  See flow sheet   Rationale:      increase ROM and improve posture to improve the patients ability to perform ADLs/IADLs     Billed With/As:   [x] TE   [] TA   [] Neuro   [] Self Care Patient Education: [x] Review HEP    [] Progressed/Changed HEP based on:   [x] positioning   [x] body mechanics   [] transfers   [x] heat/ice application    [x] other: icing, avoidance of aggravating factors     Other Objective/Functional Measures:    See Eval     Post Treatment Pain Level (on 0 to 10) scale:   5  / 10     ASSESSMENT  Assessment/Changes in Function:     See Eval     []  See Progress Note/Recertification   Patient will continue to benefit from skilled PT services to modify and progress therapeutic interventions to attain remaining goals. Progress toward goals / Updated goals:  Pt denied sharp pain or red flags with initial eval or therapeutic ex. See initial eval. HEP administered. Pt to get EMG this Wednesday.      PLAN  []  Upgrade activities as tolerated yes Continue plan of care   []  Discharge due to :    []  Other:      Therapist: Dimitri Pyle PT    Date: 2020 Time: 7:53 AM     Future Appointments Date Time Provider Julio Porter   11/23/2020  8:00 AM [address] [facility] [address] [facility]   11/25/2020  2:30 PM Lori Batista MD [facility] BS AMB   12/10/2020  8:30 AM Mumtaz Loera MD ALYSE BS AMB   3/16/2021  8:45 AM LAB_[facility] BS AMB   3/23/2021  8:30 AM Magaly Benites MD [facility] BS AMB

## 2020-11-25 ENCOUNTER — OFFICE VISIT (OUTPATIENT)
Dept: ORTHOPEDIC SURGERY | Age: 35
End: 2020-11-25
Payer: MEDICAID

## 2020-11-25 VITALS
TEMPERATURE: 97.7 F | RESPIRATION RATE: 16 BRPM | DIASTOLIC BLOOD PRESSURE: 71 MMHG | WEIGHT: 148 LBS | HEIGHT: 61 IN | SYSTOLIC BLOOD PRESSURE: 113 MMHG | OXYGEN SATURATION: 100 % | BODY MASS INDEX: 27.94 KG/M2 | HEART RATE: 80 BPM

## 2020-11-25 DIAGNOSIS — R20.0 RIGHT ARM NUMBNESS: ICD-10-CM

## 2020-11-25 DIAGNOSIS — R20.0 RIGHT ARM NUMBNESS: Primary | ICD-10-CM

## 2020-11-25 PROCEDURE — 95886 MUSC TEST DONE W/N TEST COMP: CPT | Performed by: PHYSICAL MEDICINE & REHABILITATION

## 2020-11-25 PROCEDURE — 95909 NRV CNDJ TST 5-6 STUDIES: CPT | Performed by: PHYSICAL MEDICINE & REHABILITATION

## 2020-11-25 RX ORDER — IPRATROPIUM BROMIDE AND ALBUTEROL 20; 100 UG/1; UG/1
SPRAY, METERED RESPIRATORY (INHALATION)
COMMUNITY
Start: 2020-10-20 | End: 2021-02-09

## 2020-11-25 NOTE — PROGRESS NOTES
Sascha Batista presents today for   Chief Complaint   Patient presents with    Arm Pain     right    Numbness     and tingling to right arm       Is someone accompanying this pt? no    Is the patient using any DME equipment during OV? no    Depression Screening:  3 most recent PHQ Screens 11/10/2020   PHQ Not Done Patient Decline   Little interest or pleasure in doing things -   Feeling down, depressed, irritable, or hopeless -   Total Score PHQ 2 -       Learning Assessment:  Learning Assessment 6/13/2019   PRIMARY LEARNER Patient   BARRIERS PRIMARY LEARNER NONE   CO-LEARNER CAREGIVER No   PRIMARY LANGUAGE ENGLISH   LEARNER PREFERENCE PRIMARY READING   ANSWERED BY self   RELATIONSHIP SELF       Abuse Screening:  Abuse Screening Questionnaire 6/18/2020   Do you ever feel afraid of your partner? N   Are you in a relationship with someone who physically or mentally threatens you? N   Is it safe for you to go home? Y       Fall Risk  Fall Risk Assessment, last 12 mths 6/13/2019   Able to walk? Yes   Fall in past 12 months? No         Coordination of Care:  1. Have you been to the ER, urgent care clinic since your last visit? no  Hospitalized since your last visit? no    2. Have you seen or consulted any other health care providers outside of the [address] [place] [address] since your last visit? Yes, physical therapy and pulmonology Include any pap smears or colon screening.  no

## 2020-11-25 NOTE — PROGRESS NOTES
[address]  [address], [address]  [address]  Phone: [phone]  Fax: [phone]        Allison Hernandez  : 1985  PCP: Charo Garces MD  2020    ELECTROMYOGRAPHY AND NERVE CONDUCTION STUDIES    Nehemiah Manuel was referred by Dr. Curlee Gowers for electrodiagnostic evaluation of RUE numbness. NCV & EMG Findings:  All nerve conduction studies (as indicated in the following tables) were within normal limits. All examined muscles (as indicated in the following table) showed no evidence of electrical instability. INTERPRETATION  This was a normal nerve conduction and EMG study showing there to be no signs of neuropathy, myopathy, or radiculopathy in the nerves and muscles tested. CLINICAL INTERPRETATION  Her electrodiagnostic findings do not appear to explain her right arm symptoms. HISTORY OF PRESENT ILLNESS  Nehemiah Manuel is a 38 y.o. female. Pt presents today for RUE EMG evaluation of neck pain radiating into the RUE into the 4th and 5th digits. PAST MEDICAL HISTORY   Past Medical History:   Diagnosis Date    Anemia     Hemorrhoids     Joint pain     Muscle pain        Past Surgical History:   Procedure Laterality Date    FLEXIBLE SIGMOIDOSCOPY N/A 2019    SIGMOIDOSCOPY FLEXIBLE performed by Diamante Thomas MD at [facility]      iud inserted and removed    HX HYSTERECTOMY      HX OTHER SURGICAL  Lap in     HX TUBAL LIGATION     . MEDICATIONS    Current Outpatient Medications   Medication Sig Dispense Refill    naproxen (NAPROSYN) 500 mg tablet Take 1 Tab by mouth two (2) times daily (with meals). 60 Tab 1    methylPREDNISolone (MEDROL DOSEPACK) 4 mg tablet Per dose pack instructions 1 Dose Pack 0    gabapentin (Neurontin) 300 mg capsule Take 1 cap by mouth in the morning and 2 at night as directed.  90 Cap 1    albuterol (PROVENTIL HFA, VENTOLIN HFA, PROAIR HFA) 90 mcg/actuation inhaler PLEASE SEE ATTACHED FOR DETAILED DIRECTIONS      Symbicort 80-4.5 mcg/actuation HFAA INHALE 1 PUFF BY MOUTH TWICE A DAY RINSE MOUTH AFTER USE      lidocaine (LIDODERM) 5 % 1 Patch by TransDERmal route.  methocarbamoL (ROBAXIN) 750 mg tablet Take 750 mg by mouth every four (4) hours as needed.  methylPREDNISolone (MEDROL DOSEPACK) 4 mg tablet       montelukast (SINGULAIR) 10 mg tablet TAKE 1 TABLET BY MOUTH EVERYDAY AT BEDTIME      naproxen (NAPROSYN) 500 mg tablet Take 500 mg by mouth two (2) times daily as needed.  sertraline (ZOLOFT) 50 mg tablet Take 25 mg by mouth daily.  ergocalciferol (ERGOCALCIFEROL) 50,000 unit capsule Take 1 Cap by mouth every seven (7) days. 12 Cap 0    meloxicam (MOBIC) 15 mg tablet TAKE 1 TAB BY MOUTH DAILY AS NEEDED FOR PAIN. TAKE WITH FOOD  1    Cholecalciferol, Vitamin D3, (VITAMIN D3) 2,000 unit cap capsule Take  by mouth two (2) times a day.  oxycodone-acetaminophen (PERCOCET) 5-325 mg per tablet Take 1-2 tablets by mouth every six (6) hours as needed for Pain. 40 tablet 0    ibuprofen (MOTRIN) 600 mg tablet Take  by mouth.  Indications: PAIN          ALLERGIES  No Known Allergies       SOCIAL HISTORY    Social History     Socioeconomic History    Marital status: UNKNOWN     Spouse name: Not on file    Number of children: Not on file    Years of education: Not on file    Highest education level: Not on file   Tobacco Use    Smoking status: Never Smoker    Smokeless tobacco: Never Used   Substance and Sexual Activity    Alcohol use: No    Drug use: No    Sexual activity: Yes     Partners: Male       FAMILY HISTORY  Family History   Problem Relation Age of Onset    Diabetes Mother     Hypertension Mother     Stroke Maternal Grandfather     Breast Cancer Paternal Aunt     Hypertension Father     Depression Father          PHYSICAL EXAMINATION  Visit Vitals  LMP 10/06/2014       Pain Assessment  11/10/2020 Location of Pain Neck; Shoulder   Location Modifiers Right;Posterior   Severity of Pain 4   Quality of Pain Burning;Aching; Other (Comment)   Quality of Pain Comment numbness and tingling   Duration of Pain Persistent   Frequency of Pain Constant   Aggravating Factors Other (Comment)   Aggravating Factors Comment laying down   Relieving Factors Rest;Other (Comment)   Relieving Factors Comment sitting up   Result of Injury No           Constitutional:  Well developed, well nourished, in no acute distress. Psychiatric: Affect and mood are appropriate. Integumentary: No rashes or abrasions noted on exposed areas. SPINE/MUSCULOSKELETAL EXAM    On brief examination: Pain with movement of R elbow.       NCV & EMG Findings:  Nerve Conduction Studies  Anti Sensory Summary Table     Stim Site NR Peak (ms) Norm Peak (ms) O-P Amp (µV) Norm O-P Amp Site1 Site2 Delta-P (ms) Dist (cm) Josh (m/s) Norm Josh (m/s)   Right Median Anti Sensory (2nd Digit)   Wrist    3.3 <3.6 142.6 >10 Wrist 2nd Digit 3.3 14.0 42 >39   Right Radial Anti Sensory (Base 1st Digit)   Wrist    2.2 <3.1 80.2  Wrist Base 1st Digit 2.2 0.0     Right Ulnar Anti Sensory (5th Digit)   Wrist    3.3 <3.7 102.3 >15.0 Wrist 5th Digit 3.3 14.0 42 >38     Motor Summary Table     Stim Site NR Onset (ms) Norm Onset (ms) O-P Amp (mV) Norm O-P Amp Site1 Site2 Delta-0 (ms) Dist (cm) Josh (m/s) Norm Josh (m/s)   Right Median Motor (Abd Poll Brev)   Wrist    3.4 <4.2 18.5 >5 Elbow Wrist 3.3 19.5 59 >50   Elbow    6.7  17.8          Right Ulnar Motor (Abd Dig Min)   Wrist    2.9 <4.2 12.4 >3 B Elbow Wrist 3.1 16.5 53 >53   B Elbow    6.0  11.3  A Elbow B Elbow 1.7 10.0 59 >53   A Elbow    7.7  10.7            EMG     Side Muscle Nerve Root Ins Act Fibs Psw Amp Dur Poly Recrt Int Geraldean Hippo Comment   Right Biceps Musculocut C5-6 Nml Nml Nml Nml Nml 0 Nml Nml    Right Triceps Radial C6-7-8 Nml Nml Nml Nml Nml 0 Nml Nml    Right PronatorTeres Median C6-7 Nml Nml Nml Nml Nml 0 Nml Nml    Right Abd Poll Brev Median C8-T1 Nml Nml Nml Nml Nml 0 Nml Nml    Right 1stDorInt Ulnar C8-T1 Nml Nml Nml Nml Nml 0 Nml Nml    Right FlexCarpiUln Ulnar C8,T1 Nml Nml Nml Nml Nml 0 Nml Nml        Nerve Conduction Studies  Anti Sensory Left/Right Comparison     Stim Site L Lat (ms) R Lat (ms) L-R Lat (ms) L Amp (µV) R Amp (µV) L-R Amp (%) Site1 Site2 L Josh (m/s) R Josh (m/s) L-R Josh (m/s)   Median Anti Sensory (2nd Digit)   Wrist  3.3   142.6  Wrist 2nd Digit  42    Radial Anti Sensory (Base 1st Digit)   Wrist  2.2   80.2  Wrist Base 1st Digit      Ulnar Anti Sensory (5th Digit)   Wrist  3.3   102.3  Wrist 5th Digit  42      Motor Left/Right Comparison     Stim Site L Lat (ms) R Lat (ms) L-R Lat (ms) L Amp (mV) R Amp (mV) L-R Amp (%) Site1 Site2 L Josh (m/s) R Josh (m/s) L-R Josh (m/s)   Median Motor (Abd Poll Brev)   Wrist  3.4   18.5  Elbow Wrist  59    Elbow  6.7   17.8         Ulnar Motor (Abd Dig Min)   Wrist  2.9   12.4  B Elbow Wrist  53    B Elbow  6.0   11.3  A Elbow B Elbow  59    A Elbow  7.7   10.7               Waveforms:                     [facility]  OFFICE PROCEDURE PROGRESS NOTE        Chart reviewed for the following:   Gaurang CANCHOLA, have reviewed the History, Physical and updated the Allergic reactions for [employer]     TIME OUT performed immediately prior to start of procedure:   Gaurang CANCHOLA, have performed the following reviews on [employer] prior to the start of the procedure:            * Patient was identified by name and date of birth   * Agreement on procedure being performed was verified  * Risks and Benefits explained to the patient  * Procedure site verified and marked as necessary  * Patient was positioned for comfort  * Consent was signed and verified     Time: 3:15 PM    Date of procedure: 11/25/2020    Procedure performed by:  Tereso Jacobs MD    Provider accompanied by: Jessa.     Patient accompanied by: Self.    How tolerated by patient: tolerated the procedure well with no complications    Post Procedural Pain Scale: 0 - No Hurt    Comments: none    Written by Tami Posada, [address] as dictated by Osvaldo Mena MD

## 2020-12-02 ENCOUNTER — APPOINTMENT (OUTPATIENT)
Dept: PHYSICAL THERAPY | Age: 35
End: 2020-12-02
Payer: MEDICAID

## 2020-12-04 ENCOUNTER — APPOINTMENT (OUTPATIENT)
Dept: PHYSICAL THERAPY | Age: 35
End: 2020-12-04
Payer: MEDICAID

## 2020-12-10 ENCOUNTER — APPOINTMENT (OUTPATIENT)
Dept: PHYSICAL THERAPY | Age: 35
End: 2020-12-10
Payer: MEDICAID

## 2020-12-10 ENCOUNTER — OFFICE VISIT (OUTPATIENT)
Dept: ORTHOPEDIC SURGERY | Age: 35
End: 2020-12-10
Payer: MEDICAID

## 2020-12-10 VITALS
HEIGHT: 61 IN | TEMPERATURE: 97.3 F | RESPIRATION RATE: 16 BRPM | HEART RATE: 88 BPM | WEIGHT: 150.2 LBS | SYSTOLIC BLOOD PRESSURE: 112 MMHG | DIASTOLIC BLOOD PRESSURE: 71 MMHG | OXYGEN SATURATION: 100 % | BODY MASS INDEX: 28.36 KG/M2

## 2020-12-10 DIAGNOSIS — M25.511 ACUTE PAIN OF RIGHT SHOULDER: Primary | ICD-10-CM

## 2020-12-10 DIAGNOSIS — M62.838 MUSCLE SPASM: ICD-10-CM

## 2020-12-10 DIAGNOSIS — M79.18 MYOFASCIAL PAIN: ICD-10-CM

## 2020-12-10 DIAGNOSIS — R20.0 RIGHT ARM NUMBNESS: ICD-10-CM

## 2020-12-10 PROCEDURE — 99214 OFFICE O/P EST MOD 30 MIN: CPT | Performed by: PHYSICAL MEDICINE & REHABILITATION

## 2020-12-10 RX ORDER — DICLOFENAC SODIUM 75 MG/1
75 TABLET, DELAYED RELEASE ORAL 2 TIMES DAILY WITH MEALS
Qty: 60 TAB | Refills: 1 | Status: SHIPPED | OUTPATIENT
Start: 2020-12-10 | End: 2021-01-05

## 2020-12-10 RX ORDER — METHOCARBAMOL 500 MG/1
TABLET, FILM COATED ORAL
Qty: 60 TAB | Refills: 1 | Status: SHIPPED | OUTPATIENT
Start: 2020-12-10 | End: 2021-02-09

## 2020-12-10 NOTE — LETTER
12/11/20 Patient: Achilles Larger YOB: 1985 Date of Visit: 12/10/2020 Tami Lacey MD 
[address] [facility] [address] [address] VIA Facsimile: [phone] Dear Tami Lacey MD, Thank you for referring Ms. Ezra Ya to [facility] for evaluation. My notes for this consultation are attached. If you have questions, please do not hesitate to call me. I look forward to following your patient along with you. Sincerely, Marcos Cevallos MD

## 2020-12-10 NOTE — PROGRESS NOTES
[facility]  [address], [address]  Phone: [phone]  Fax: [phone]    Pt's YOB: 1985    ASSESSMENT   Diagnoses and all orders for this visit:    1. Acute pain of right shoulder  -     diclofenac EC (VOLTAREN) 75 mg EC tablet; Take 1 Tab by mouth two (2) times daily (with meals). -     REFERRAL TO PHYSICAL THERAPY    2. Muscle spasm  -     methocarbamoL (ROBAXIN) 500 mg tablet; Take 1 tab by mouth BID-TID as needed for muscle spasm. 3. Myofascial pain    4. Right arm numbness         IMPRESSION AND PLAN:  Mat Bingham is a 38 y.o. right hand dominant female with history of cervical pain. Pt complains of pain in the neck that radiates down the right arm into the 4th and 5th digits since 9/2020. She admits to pain in the right shoulder and reports no relief when she previously followed up with orthopedics and had a steroid injection. Pt reports minimal relief when taking Naprosyn 500 mg as needed and she did not start the Neurontin. 1) Pt was given information on cervical and shoulder arthritis exercises. 2) She will continue with physical therapy with added orders for the right shoulder. 3) Discussed ordering a right shoulder MRI arthrogram if symptoms persist.  4) Pt was prescribed Voltaren 75 mg BID prn with food to take in place of the Naprosyn since it is not effective. 5) She was prescribed Robaxin 500 mg 1 tab BID-TID prn muscle spasm. 6) Ms. Kin Beatty has a reminder for a \"due or due soon\" health maintenance. I have asked that she contact her primary care provider, Oscar Perez MD, for follow-up on this health maintenance. 7)  demonstrated consistency with prescribing. 8) Use of theracane and possible dry needling also reviewed  Follow-up and Dispositions    · Return in about 4 weeks (around 1/7/2021) for Medication follow up, PT follow up.              HISTORY OF PRESENT ILLNESS:  Mat Bingham is a 38 y.o. right hand dominant female with history of cervical pain and presents to the office today for EMG follow up. Pt complains of pain in the neck that radiates down the right arm into the 4th and 5th digits since 9/2020. She denies any inciting injuries and notes that she woke up around 1-2 AM with excruciating pain. Pt denies any episodes of neck pain prior to 9/2020 or any change in her symptoms since the onset of pain. She admits to pain in the right shoulder and reports no relief when she previously followed up with orthopedics and had a steroid injection. Pt reports moderate relief when using heat on her shoulder. She has attended 1 session of physical therapy at [place] [address] since her last office visit without improvement. Pt notes that she has a physical therapy session schedule tomorrow. She underwent a right upper extremity EMG/NCS by Dr. Dorian Mandel on 11/25/2020 which demonstrated normal findings. Pt reports minimal relief when taking Naprosyn 500 mg as needed. She did not start her previously prescribed Neurontin. Pt has previously taken muscle relaxants as prescribed by her PCP, Dino Fields MD. Pt at this time desires to proceed with medication evaluation.     Pain Scale: 4/10    PCP: Dino Fields MD     Past Medical History:   Diagnosis Date    Anemia     Hemorrhoids     Joint pain     Muscle pain         Social History     Socioeconomic History    Marital status: UNKNOWN     Spouse name: Not on file    Number of children: Not on file    Years of education: Not on file    Highest education level: Not on file   Occupational History    Not on file   Social Needs    Financial resource strain: Not on file    Food insecurity     Worry: Not on file     Inability: Not on file    Transportation needs     Medical: Not on file     Non-medical: Not on file   Tobacco Use    Smoking status: Never Smoker    Smokeless tobacco: Never Used   Substance and Sexual Activity    Alcohol use: No    Drug use: No    Sexual activity: Yes     Partners: Male   Lifestyle    Physical activity     Days per week: Not on file     Minutes per session: Not on file    Stress: Not on file   Relationships    Social connections     Talks on phone: Not on file     Gets together: Not on file     Attends [employer] witness service: Not on file     Active member of club or organization: Not on file     Attends meetings of clubs or organizations: Not on file     Relationship status: Not on file    Intimate partner violence     Fear of current or ex partner: Not on file     Emotionally abused: Not on file     Physically abused: Not on file     Forced sexual activity: Not on file   Other Topics Concern    Not on file   Social History Narrative    Not on file       Current Outpatient Medications   Medication Sig Dispense Refill    diclofenac EC (VOLTAREN) 75 mg EC tablet Take 1 Tab by mouth two (2) times daily (with meals). 60 Tab 1    methocarbamoL (ROBAXIN) 500 mg tablet Take 1 tab by mouth BID-TID as needed for muscle spasm. 60 Tab 1    Combivent Respimat  mcg/actuation inhaler INHALE 1 PUFF BY MOUTH TWICE A DAY      naproxen (NAPROSYN) 500 mg tablet Take 1 Tab by mouth two (2) times daily (with meals). 60 Tab 1    gabapentin (Neurontin) 300 mg capsule Take 1 cap by mouth in the morning and 2 at night as directed. 90 Cap 1    albuterol (PROVENTIL HFA, VENTOLIN HFA, PROAIR HFA) 90 mcg/actuation inhaler PLEASE SEE ATTACHED FOR DETAILED DIRECTIONS      Symbicort 80-4.5 mcg/actuation HFAA INHALE 1 PUFF BY MOUTH TWICE A DAY RINSE MOUTH AFTER USE      lidocaine (LIDODERM) 5 % 1 Patch by TransDERmal route every twenty-four (24) hours.  methocarbamoL (ROBAXIN) 750 mg tablet Take 750 mg by mouth every four (4) hours as needed.  montelukast (SINGULAIR) 10 mg tablet TAKE 1 TABLET BY MOUTH EVERYDAY AT BEDTIME      sertraline (ZOLOFT) 50 mg tablet Take 25 mg by mouth daily.       ergocalciferol (ERGOCALCIFEROL) 50,000 unit capsule Take 1 Cap by mouth every seven (7) days. 12 Cap 0    meloxicam (MOBIC) 15 mg tablet TAKE 1 TAB BY MOUTH DAILY AS NEEDED FOR PAIN. TAKE WITH FOOD  1    Cholecalciferol, Vitamin D3, (VITAMIN D3) 2,000 unit cap capsule Take  by mouth two (2) times a day.  oxycodone-acetaminophen (PERCOCET) 5-325 mg per tablet Take 1-2 tablets by mouth every six (6) hours as needed for Pain. 40 tablet 0    ibuprofen (MOTRIN) 600 mg tablet Take  by mouth. Indications: PAIN         No Known Allergies      REVIEW OF SYSTEMS    Constitutional: Negative for fever, chills, or weight change. Respiratory: Negative for cough or shortness of breath. Cardiovascular: Negative for chest pain or palpitations. Gastrointestinal: Negative for acid reflux, change in bowel habits, or constipation. Genitourinary: Negative for dysuria and flank pain. Musculoskeletal: Positive for cervical and right shoulder pain. Neurological: Negative for headaches or dizziness. Positive for numbness. Psychiatric/Behavioral: Positive for difficulty with sleep. As per HPI    PHYSICAL EXAMINATION  Visit Vitals  /71 (BP 1 Location: Left arm, BP Patient Position: Sitting)   Pulse 88   Temp 97.3 °F (36.3 °C) (Skin)   Resp 16   Ht 5' 1\" (1.549 m)   Wt 150 lb 3.2 oz (68.1 kg)   LMP 10/06/2014   SpO2 100%   BMI 28.38 kg/m²       Constitutional: Awake, alert, and in no acute distress. Neurological: 1+ symmetrical DTRs in the upper extremities. Sensation to light touch is intact. Negative Suarez's sign bilaterally. Skin: warm, dry, and intact. Musculoskeletal: Pain with abduction of the right shoulder to 90 degrees. Positive Hiram Solum' test on the right. Pain with internal and external rotation of the right shoulder. Negative empty can test bilaterally. Good strength with resisted abduction and adductions. Tight across the upper trapezius bilaterally with scattered trigger points.  Decreased range of motion with side to side cervical flexion. Biceps  Triceps Deltoids Wrist Ext Wrist Flex Hand Intrin   Right +4/5 +4/5 +4/5 +4/5 +4/5 +4/5   Left +4/5 +4/5 +4/5 +4/5 +4/5 +4/5     IMAGING:    Cervical spine MRI from 10/1/2020 was personally reviewed with the patient and demonstrated:  FINDINGS:     Postoperative changes: None. Alignment: Straightening of upper cervical lordotic curvature. .    Vertebral body heights: Normal.    Marrow signal: Normal.    Cord: Normal.    Cerebellar tonsils: No Chiari 1 malformation. Soft tissues: Normal.     Correlation of axial and sagittal data through the disc levels:    -C2-3: No significant disc pathology. Facet joints appear normal..No spinal canal or foraminal stenosis. -C3-4: No significant disc pathology. Facet joints appear normal..No spinal canal or foraminal stenosis. -C4-5: No significant disc pathology. Facet joints appear normal..No spinal canal or foraminal stenosis. -C5-6: Disc height is mildly diminished with slight disc bulging. Facet joints appear normal.. No spinal canal or foraminal stenosis. -C6-7: No significant disc pathology. Facet joints appear normal..No spinal canal or foraminal stenosis. -C7-T1: No significant disc pathology. Facet joints appear normal.. The small ossified body projecting in the C7-T1 superior left neural foramen on the prior radiographs is difficult to visualize on the MR images. It is possibly related to the facet on series 3 image 4. No significant spinal canal or foraminal stenosis. IMPRESSION  No large disc herniation, cord compression or cord signal abnormality. No foraminal stenosis or nerve root compression seen.  Previously reported ossified density in the C7-T1 left neural foramen difficult to visualize as discussed above but of doubtful clinical significance given the patient's reported right side arm pain and weakness.     Cervical spine x-rays from 9/18/2020 were personally reviewed with the patient and demonstrated:  FINDINGS:    VERTEBRAE AND ALIGNMENT: No fracture or subluxation is seen. FACET JOINTS AND FORAMINA: Calcific opacity is seen extending from the left C7-T1 facet joint with moderate narrowing of the left foramen as seen on the oblique view    PARASPINOUS SOFT TISSUES: Unremarkable. ADDITIONAL: Radiolucency is seen surrounding a left mandibular first molar, suggesting periapical abscess. _______________    IMPRESSION    No fracture or subluxation. Unusual calcific opacity narrowing the left C7-T1 foramen superiorly, suspect elongated facet osteophyte or less likely partially calcified synovial cyst. Consider follow-up dedicated MR cervical spine especially if left C8 radiculopathy symptoms. Findings suspicious for left mandibular first molar periapical abscess.      Right shoulder x-rays from 9/16/2020 were personally reviewed with the patient and demonstrated:  FINDINGS:    BONES: Intact and normally aligned. SOFT TISSUES: Unremarkable.    _______________    IMPRESSION    No significant abnormality. Right upper extremity EMG from 11/25/2020 were personally reviewed with the patient and demonstrated:  NCV & EMG Findings:  All nerve conduction studies (as indicated in the following tables) were within normal limits.       All examined muscles (as indicated in the following table) showed no evidence of electrical instability.       INTERPRETATION  This was a normal nerve conduction and EMG study showing there to be no signs of neuropathy, myopathy, or radiculopathy in the nerves and muscles tested.      CLINICAL INTERPRETATION  Her electrodiagnostic findings do not appear to explain her right arm symptoms. Written by Swati Christensen, as dictated by Delmy Ahuja MD.  I, Dr. Delmy Ahuja confirm that all documentation is accurate.

## 2020-12-10 NOTE — PATIENT INSTRUCTIONS
Neck Arthritis: Exercises Introduction Here are some examples of exercises for you to try. The exercises may be suggested for a condition or for rehabilitation. Start each exercise slowly. Ease off the exercises if you start to have pain. You will be told when to start these exercises and which ones will work best for you. How to do the exercises Neck stretches to the side 1. This stretch works best if you keep your shoulder down as you lean away from it. To help you remember to do this, start by relaxing your shoulders and lightly holding on to your thighs or your chair. 2. Tilt your head toward your shoulder and hold for 15 to 30 seconds. Let the weight of your head stretch your muscles. 3. Repeat 2 to 4 times toward each shoulder. Chin tuck 1. Lie on the floor with a rolled-up towel under your neck. Your head should be touching the floor. 2. Slowly bring your chin toward your chest. 
3. Hold for a count of 6, and then relax for up to 10 seconds. 4. Repeat 8 to 12 times. Active cervical rotation 1. Sit in a firm chair, or stand up straight. 2. Keeping your chin level, turn your head to the right, and hold for 15 to 30 seconds. 3. Turn your head to the left and hold for 15 to 30 seconds. 4. Repeat 2 to 4 times to each side. Shoulder blade squeeze 1. While standing, squeeze your shoulder blades together. 2. Do not raise your shoulders up as you are squeezing. 3. Hold for 6 seconds. 4. Repeat 8 to 12 times. Shoulder rolls 1. Sit comfortably with your feet shoulder-width apart. You can also do this exercise standing up. 2. Roll your shoulders up, then back, and then down in a smooth, circular motion. 3. Repeat 2 to 4 times. Follow-up care is a key part of your treatment and safety. Be sure to make and go to all appointments, and call your doctor if you are having problems. It's also a good idea to know your test results and keep a list of the medicines you take. Where can you learn more? Go to http://www.gray.com/ Enter D523 in the search box to learn more about \"Neck Arthritis: Exercises. \" Current as of: March 2, 2020               Content Version: 12.6 © 8896-3243 [employer]. Care instructions adapted under license by [employer] (which disclaims liability or warranty for this information). If you have questions about a medical condition or this instruction, always ask your healthcare professional. [address] any warranty or liability for your use of this information. Shoulder Arthritis: Exercises Introduction Here are some examples of exercises for you to try. The exercises may be suggested for a condition or for rehabilitation. Start each exercise slowly. Ease off the exercises if you start to have pain. You will be told when to start these exercises and which ones will work best for you. How to do the exercises Shoulder flexion (lying down) To make a wand for this exercise, use a piece of PVC pipe or a broom handle with the broom removed. Make the wand about a foot wider than your shoulders. 4. Lie on your back, holding a wand with both hands. Your palms should face down as you hold the wand. 5. Keeping your elbows straight, slowly raise your arms over your head. Raise them until you feel a stretch in your shoulders, upper back, and chest. 
6. Hold for 15 to 30 seconds. 7. Repeat 2 to 4 times. Shoulder rotation (lying down) To make a wand for this exercise, use a piece of PVC pipe or a broom handle with the broom removed. Make the wand about a foot wider than your shoulders. 5. Lie on your back. Hold a wand with both hands with your elbows bent and palms up. 6. Keep your elbows close to your body, and move the wand across your body toward the sore arm. 7. Hold for 8 to 12 seconds. 8. Repeat 2 to 4 times. Shoulder internal rotation with towel 5. Hold a towel above and behind your head with the arm that is not sore. 6. With your sore arm, reach behind your back and grasp the towel. 7. With the arm above your head, pull the towel upward. Do this until you feel a stretch on the front and outside of your sore shoulder. 8. Hold 15 to 30 seconds. 9. Repeat 2 to 4 times. Shoulder blade squeeze 5. Stand with your arms at your sides, and squeeze your shoulder blades together. Do not raise your shoulders up as you squeeze. 6. Hold 6 seconds. 7. Repeat 8 to 12 times. Resisted rows For this exercise, you will need elastic exercise material, such as surgical tubing or Thera-Band. 4. Put the band around a solid object at about waist level. (A bedpost will work well.) Each hand should hold an end of the band. 5. With your elbows at your sides and bent to 90 degrees, pull the band back. Your shoulder blades should move toward each other. Return to the starting position. 6. Repeat 8 to 12 times. External rotator strengthening exercise 1. Start by tying a piece of elastic exercise material to a doorknob. You can use surgical tubing or Thera-Band. (You may also hold one end of the band in each hand.) 2. Stand or sit with your shoulder relaxed and your elbow bent 90 degrees. Your upper arm should rest comfortably against your side. Squeeze a rolled towel between your elbow and your body for comfort. This will help keep your arm at your side. 3. Hold one end of the elastic band with the hand of the painful arm. 4. Start with your forearm across your belly. Slowly rotate the forearm out away from your body. Keep your elbow and upper arm tucked against the towel roll or the side of your body until you begin to feel tightness in your shoulder. Slowly move your arm back to where you started. 5. Repeat 8 to 12 times. Internal rotator strengthening exercise 1. Start by tying a piece of elastic exercise material to a doorknob.  You can use surgical tubing or Thera-Band. 2. Stand or sit with your shoulder relaxed and your elbow bent 90 degrees. Your upper arm should rest comfortably against your side. Squeeze a rolled towel between your elbow and your body for comfort. This will help keep your arm at your side. 3. Hold one end of the elastic band in the hand of the painful arm. 4. Slowly rotate your forearm toward your body until it touches your belly. Slowly move it back to where you started. 5. Keep your elbow and upper arm firmly tucked against the towel roll or at your side. 6. Repeat 8 to 12 times. Pendulum swing If you have pain in your back, do not do this exercise. 1. Hold on to a table or the back of a chair with your good arm. Then bend forward a little and let your sore arm hang straight down. This exercise does not use the arm muscles. Rather, use your legs and your hips to create movement that makes your arm swing freely. 2. Use the movement from your hips and legs to guide the slightly swinging arm back and forth like a pendulum (or elephant trunk). Then guide it in circles that start small (about the size of a dinner plate). Make the circles a bit larger each day, as your pain allows. 3. Do this exercise for 5 minutes, 5 to 7 times each day. 4. As you have less pain, try bending over a little farther to do this exercise. This will increase the amount of movement at your shoulder. Follow-up care is a key part of your treatment and safety. Be sure to make and go to all appointments, and call your doctor if you are having problems. It's also a good idea to know your test results and keep a list of the medicines you take. Where can you learn more? Go to http://www.gray.com/ Enter H562 in the search box to learn more about \"Shoulder Arthritis: Exercises. \" Current as of: March 2, 2020               Content Version: 12.6 © 6172-6430 [employer]. Care instructions adapted under license by [employer] (which disclaims liability or warranty for this information). If you have questions about a medical condition or this instruction, always ask your healthcare professional. [address] any warranty or liability for your use of this information.

## 2020-12-10 NOTE — LETTER
NOTIFICATION RETURN TO WORK / SCHOOL 
 
12/10/2020 9:32 AM 
 
Ms. Aron Price [address] [facility] [address] To Whom It May Concern: 
 
Aron Price [facility]. Ms. Hollis Matt was seen in our office today, 12/10/2020. If there are questions or concerns please have the patient contact our office. Sincerely, Elizabeth Dumont MD

## 2020-12-11 ENCOUNTER — HOSPITAL ENCOUNTER (OUTPATIENT)
Dept: PHYSICAL THERAPY | Age: 35
Discharge: HOME | End: 2020-12-11
Payer: MEDICAID

## 2020-12-11 PROCEDURE — 97112 NEUROMUSCULAR REEDUCATION: CPT

## 2020-12-11 PROCEDURE — 97110 THERAPEUTIC EXERCISES: CPT

## 2020-12-11 PROCEDURE — 97530 THERAPEUTIC ACTIVITIES: CPT

## 2020-12-11 NOTE — PROGRESS NOTES
PHYSICAL THERAPY - DAILY TREATMENT NOTE    Patient Name: Megan Holt        Date: 2020  : 1985   yes Patient  Verified  Visit #:   2   of    Insurance: Payor: Quinn Estrada / Plan: Dorota Barraza / Product Type: Managed Care Medicaid /      In time: 8:31 Out time: 9:38   Total Treatment Time: 67     Medicare/BCBS Time Tracking (below)   Total Timed Codes (min):  50 1:1 Treatment Time:  45     TREATMENT AREA =  Neck pain [M54.2]  Arm pain, right [M79.601]    SUBJECTIVE  Pain Level (on 0 to 10 scale):  4  / 10   Medication Changes/New allergies or changes in medical history, any new surgeries or procedures?    no  If yes, update Summary List   Subjective Functional Status/Changes:  []  No changes reported   I feel the pain from my neck down to my wrist, but I feel the numbness in the outside of my hand as well as my ring and pinky fingers.           OBJECTIVE  Modalities Rationale:     decrease pain and increase tissue extensibility to improve patient's ability to improve functional abilities    min [] Estim, type/location:                                      []  att     []  unatt     []  w/US     []  w/ice    []  w/heat    min []  Mechanical Traction: type/lbs                   []  pro   []  sup   []  int   []  cont    []  before manual    []  after manual    min []  Ultrasound, settings/location:      min []  Iontophoresis w/ dexamethasone, location:                                               []  take home patch       []  in clinic   10 min []  Ice     [x]  Heat    location/position: R cervical/seated     min []  Vasopneumatic Device, press/temp:     min []  Other:    [] Skin assessment post-treatment (if applicable):    []  intact    []  redness- no adverse reaction     []redness  adverse reaction:        22 min Therapeutic Exercise:  [x]  See flow sheet   Rationale:      increase ROM and increase strength to improve the patients ability to improve functional abilities      7 min Manual Therapy: STM/tissue mobs to R cervical musculature in sitting    Rationale:      decrease pain, increase ROM, increase tissue extensibility, decrease trigger points and increase postural awareness to improve patient's ability to improve functional mobility   The manual therapy interventions were performed at a separate and distinct time from the therapeutic activities interventions. 14 min Therapeutic Activity: [x]  See flow sheet   Rationale:    increase ROM and increase strength to improve the patients ability to perform UE ADL's with increased ease/decreased pain     14 min Neuromuscular Re-ed: [x]  See flow sheet   Rationale:    increase strength and improve coordination to improve the patients ability to improve UE coordination and postural muscle recruitment     Billed With/As:   [] TE   [] TA   [] Neuro   [] Self Care Patient Education: [x] Review HEP    [] Progressed/Changed HEP based on:   [] positioning   [] body mechanics   [] transfers   [] heat/ice application    [] other:      Other Objective/Functional Measures:       Post Treatment Pain Level (on 0 to 10) scale:   5  / 10     ASSESSMENT  Pt tolerated all new therex fairly well upon trial today with chief c/o R cervical and UE radicular pain/symptoms down to wrist and numbness/paresthesia in lateral side of hand as well as digits 4&5. Pt also reported R UE burning pain/symptoms at end of treatment, but was not taken into any side bending/directional preference today. Pt needs the most focus on postural correction/strengthening and cervicothoracic stabilization with advancing as tolerated. .Will continue to progress/advance patient within current POC as tolerated with monitoring symptoms.    []  See Progress Note/Recertification   Patient will continue to benefit from skilled PT services to modify and progress therapeutic interventions, address functional mobility deficits, address ROM deficits, address strength deficits, analyze and address soft tissue restrictions, analyze and cue movement patterns, analyze and modify body mechanics/ergonomics, assess and modify postural abnormalities and instruct in home and community integration to attain remaining goals. Progress toward goals / Updated goals: · Short Term Goals: To be accomplished in  2  treatments:  1. Pt will be compliant with HEP for symptom management at home and independent in such for self management at discharge. 12/11/20: Met     · Long Term Goals: To be accomplished in  8-12  treatments:  1. Pt will demonstrate an increased FOTO score to 72/100 in order to improve function  2. Pt will report a +4 on GROC in order to assist with pain free ADLs/IADLs  3. Pt will demonstrate increased pain free AROM into R cervical rotation to >/= 65 deg in order to more easily look over shoulder while driving  4.  Pt will test negative for ULTT on the ulnar nerve on 2 consecutive occasions in order to improve neurodynamic mobility and assist in a return to function     PLAN  [x]  Upgrade activities as tolerated yes Continue plan of care   []  Discharge due to :    []  Other:      Therapist: Patsy Servin PTA    Date: 12/11/2020 Time: 8:35 AM       Future Appointments   Date Time Provider Julio Porter   12/15/2020  2:45 PM Cheri Salinas, PT [facility]   12/18/2020  7:45 AM [facility], PTA [facility]   12/22/2020 10:45 AM [facility], PTA [facility]   12/24/2020  7:00 AM Karie Horton [facility]   12/28/2020  4:00 PM Gino Mead [facility]   12/30/2020  3:45 PM [facility], ALEX [facility]   1/4/2021  7:50 AM Kermit Mcmahan MD [facility]   3/16/2021  8:45 AM [facility]   3/23/2021  8:30 AM Shena Benites MD [facility]

## 2020-12-14 ENCOUNTER — APPOINTMENT (OUTPATIENT)
Dept: PHYSICAL THERAPY | Age: 35
End: 2020-12-14
Payer: MEDICAID

## 2020-12-15 ENCOUNTER — HOSPITAL ENCOUNTER (OUTPATIENT)
Dept: PHYSICAL THERAPY | Age: 35
Discharge: HOME | End: 2020-12-15
Payer: MEDICAID

## 2020-12-15 PROCEDURE — 97110 THERAPEUTIC EXERCISES: CPT

## 2020-12-15 PROCEDURE — 97530 THERAPEUTIC ACTIVITIES: CPT

## 2020-12-15 PROCEDURE — 97112 NEUROMUSCULAR REEDUCATION: CPT

## 2020-12-15 NOTE — PROGRESS NOTES
PHYSICAL THERAPY - DAILY TREATMENT NOTE    Patient Name: Elo Carlson        Date: 12/15/2020  : 1985   yes Patient  Verified  Visit #:   3     Insurance: Payor: Renata Way / Plan: Pamela Herrera / Product Type: Managed Care Medicaid /      In time: 2:44 PM Out time: 3:58 PM   Total Treatment Time: 74     Medicare/BCBS Time Tracking (below)   Total Timed Codes (min):  64 1:1 Treatment Time:  n/a     TREATMENT AREA =  Neck pain [M54.2]  Arm pain, right [M79.601]    SUBJECTIVE  Pain Level (on 0 to 10 scale):  3  / 10- primarily to R elbow; no N/T to fingers   Medication Changes/New allergies or changes in medical history, any new surgeries or procedures?    no  If yes, update Summary List   Subjective Functional Status/Changes:  []  No changes reported     Pt states she has had her EMG completed since IE and results were normal. No problem with exercises performed last visit. States she had some N/T to R fingers yesterday with computer work. States she usually has problems at night trying to get to sleep \"elbow aches a lot and achey in neck/shoulders\".          OBJECTIVE  Modalities Rationale:     decrease pain and increase tissue extensibility to improve patient's ability to improve functional abilities    min [] Estim, type/location:                                      []  att     []  unatt     []  w/US     []  w/ice    []  w/heat    min []  Mechanical Traction: type/lbs                   []  pro   []  sup   []  int   []  cont    []  before manual    []  after manual    min []  Ultrasound, settings/location:      min []  Iontophoresis w/ dexamethasone, location:                                               []  take home patch       []  in clinic   10 min []  Ice     [x]  Heat    location/position: R cervical/seated     min []  Vasopneumatic Device, press/temp:     min []  Other:    [] Skin assessment post-treatment (if applicable):    []  intact    []  redness- no adverse reaction []redness  adverse reaction:        22 min Therapeutic Exercise:  [x]  See flow sheet   Rationale:      increase ROM and increase strength to improve the patients ability to improve functional abilities      7 min Manual Therapy: TrP release to R lev scap. Grade 3 left side glides C5-C6. Cervical traction. Grade 3 R 1st rib inferior mobs   Rationale:      decrease pain, increase ROM, increase tissue extensibility, decrease trigger points and increase postural awareness to improve patient's ability to improve functional mobility   The manual therapy interventions were performed at a separate and distinct time from the therapeutic activities interventions. 18 min Therapeutic Activity: [x]  See flow sheet   Rationale:    increase ROM and increase strength to improve the patients ability to perform UE ADL's with increased ease/decreased pain     17 min Neuromuscular Re-ed: [x]  See flow sheet   Rationale:    increase strength and improve coordination to improve the patients ability to improve UE coordination and postural muscle recruitment     Billed With/As:   [x] TE   [] TA   [] Neuro   [] Self Care Patient Education: [x] Review HEP    [] Progressed/Changed HEP based on:   [] positioning   [] body mechanics   [] transfers   [] heat/ice application    [] other:      Other Objective/Functional Measures:    MMT:  Shoulder flex/abd 4/5 (pain free); ER 4-/5, IR 4/5, biceps 4/5 (last 3 painful only because of stabilizing arm light pressure to elbow)  (+) R empty can, Neer's, Spurling to left causes R shoulder pain (unchanged with c/s compression), Mill's, ULTT ulnar n. (no change with wrist flex), end range ULTT median n., biceps load test II  (-) cross arm, Carlos Pham, c/s compression, c/s distraction, Cozen's  ttp to right biceps, pecs, post cuff, posterior elbow and lateral epicondyle. Mm guarding preventing accurate assessment of shoulder glides. Initially elevated R 1st rib and TrP to R LS. Grade II left lower c/s side glides    -pt c/o popping/crepitus with eccentric lower 1 lb flexion; modified to scaption with abolished sx's.  -moderate cues for seated c/s retraction (performing upper c/s nod)       Post Treatment Pain Level (on 0 to 10) scale:   5  / 10     ASSESSMENT    Findings indicate possible SLAP tear and underlying ulnar nerve irritation either central from c/s or possibly double crush with irritation at ulnar notch due to (+) tinell's. Plan to progress with shoulder and cervical stability with stretches for tight/hypertonic cervical and RUE mm's. Pt reporting increased pain post treatment; may be related to increased time spent with assessment today. []  See Progress Note/Recertification   Patient will continue to benefit from skilled PT services to modify and progress therapeutic interventions, address functional mobility deficits, address ROM deficits, address strength deficits, analyze and address soft tissue restrictions, analyze and cue movement patterns, analyze and modify body mechanics/ergonomics, assess and modify postural abnormalities and instruct in home and community integration to attain remaining goals. Progress toward goals / Updated goals: · Short Term Goals: To be accomplished in  2  treatments:  1. Pt will be compliant with HEP for symptom management at home and independent in such for self management at discharge. 12/11/20: Met     · Long Term Goals: To be accomplished in  8-12  treatments:  1. Pt will demonstrate an increased FOTO score to 72/100 in order to improve function  2. Pt will report a +4 on GROC in order to assist with pain free ADLs/IADLs  3. Pt will demonstrate increased pain free AROM into R cervical rotation to >/= 65 deg in order to more easily look over shoulder while driving  4.  Pt will test negative for ULTT on the ulnar nerve on 2 consecutive occasions in order to improve neurodynamic mobility and assist in a return to function PLAN  [x]  Upgrade activities as tolerated yes Continue plan of care   []  Discharge due to :    []  Other:      Therapist: Sadie Gomez.  Zuly Barragan, PT    Date: 12/15/2020 Time: 4:09 PM        Future Appointments   Date Time Provider Julio Porter   12/15/2020  2:45 PM Payal Chisholm, PT [facility]   12/18/2020  7:45 AM Cheli Mcginnis PTA [facility]   12/22/2020 10:45 AM Cheli Mcginnis PTA [facility]   12/24/2020  7:00 AM Geovanny Hassan [facility]   12/28/2020  4:00 PM You Cooper [facility]   12/30/2020  3:45 PM Cheli Mcginnis PTA [facility]   1/4/2021  7:50 AM Omer Booker MD [facility]   3/16/2021  8:45 AM [facility]   3/23/2021  8:30 AM Dejah Benites MD [facility]

## 2020-12-16 ENCOUNTER — APPOINTMENT (OUTPATIENT)
Dept: PHYSICAL THERAPY | Age: 35
End: 2020-12-16
Payer: MEDICAID

## 2020-12-18 ENCOUNTER — HOSPITAL ENCOUNTER (OUTPATIENT)
Dept: PHYSICAL THERAPY | Age: 35
Discharge: HOME | End: 2020-12-18
Payer: MEDICAID

## 2020-12-18 PROCEDURE — 97112 NEUROMUSCULAR REEDUCATION: CPT

## 2020-12-18 PROCEDURE — 97110 THERAPEUTIC EXERCISES: CPT

## 2020-12-18 PROCEDURE — 97530 THERAPEUTIC ACTIVITIES: CPT

## 2020-12-18 NOTE — PROGRESS NOTES
PHYSICAL THERAPY - DAILY TREATMENT NOTE    Patient Name: Tushar Speaker        Date: 2020  : 1985   yes Patient  Verified  Visit #:   4     Insurance: Payor: Ira Cardona / Plan: "[employer] / Product Type: Managed Care Medicaid /      In time: 7:49 Out time: 8:52   Total Treatment Time: 63     Medicare/[employer]BS Time Tracking (below)   Total Timed Codes (min):  46 1:1 Treatment Time:  41     TREATMENT AREA =  Neck pain [M54.2]  Arm pain, right [M79.601]    SUBJECTIVE  Pain Level (on 0 to 10 scale):  4  / 10   Medication Changes/New allergies or changes in medical history, any new surgeries or procedures?    no  If yes, update Summary List   Subjective Functional Status/Changes:  []  No changes reported   My neck doesn't feel as stiff when I move my head around compared to before, but the pain arm is about the same and I only feel the numbness in my pinky finger when I  things.            OBJECTIVE  Modalities Rationale:     decrease pain and increase tissue extensibility to improve patient's ability to improve functional abilities   min [] Estim, type/location:                                      []  att     []  unatt     []  w/US     []  w/ice    []  w/heat    min []  Mechanical Traction: type/lbs                   []  pro   []  sup   []  int   []  cont    []  before manual    []  after manual    min []  Ultrasound, settings/location:      min []  Iontophoresis w/ dexamethasone, location:                                               []  take home patch       []  in clinic   10 min []  Ice     [x]  Heat    location/position: R cervical/seated    min []  Vasopneumatic Device, press/temp:     min []  Other:    [] Skin assessment post-treatment (if applicable):    []  intact    []  redness- no adverse reaction     []redness  adverse reaction:        22 min Therapeutic Exercise:  [x]  See flow sheet   Rationale:       increase ROM and increase strength to improve the patients ability to improve functional abilities       7 min Manual Therapy: SOR/manual traction and STM/tissue mobs to R cervical musculature in sitting   Rationale:      decrease pain, increase ROM, increase tissue extensibility, decrease trigger points and increase postural awareness to improve patient's ability to improve functional mobility   The manual therapy interventions were performed at a separate and distinct time from the therapeutic activities interventions. 12 min Therapeutic Activity: [x]  See flow sheet   Rationale:     increase ROM and increase strength to improve the patients ability to perform UE ADL's with increased ease/decreased pain     12 min Neuromuscular Re-ed: [x]  See flow sheet   Rationale:     increase strength and improve coordination to improve the patients ability to improve UE coordination and postural muscle recruitment     Billed With/As:   [] TE   [] TA   [] Neuro   [] Self Care Patient Education: [x] Review HEP    [] Progressed/Changed HEP based on:   [] positioning   [] body mechanics   [] transfers   [] heat/ice application    [] other:      Other Objective/Functional Measures:       Post Treatment Pain Level (on 0 to 10) scale:   4 / 10     ASSESSMENT  Assessment/Changes in Function:   Pt presented with chief c/o continued consistent R UE radicular symptoms, but decreased R cervical pain since last treatment. Pt also reports the most functional improvement with improved active cervical mobility with ADL's over the past 2 treatments. Will continue to progress/advance patient within current POC as tolerated with monitoring symptoms.      []  See Progress Note/Recertification   Patient will continue to benefit from skilled PT services to modify and progress therapeutic interventions, address functional mobility deficits, address ROM deficits, address strength deficits, analyze and address soft tissue restrictions, analyze and cue movement patterns, analyze and modify body mechanics/ergonomics, assess and modify postural abnormalities and instruct in home and community integration to attain remaining goals. Progress toward goals / Updated goals: · Short Term Goals: To be accomplished in  2  treatments:  1. Pt will be compliant with HEP for symptom management at home and independent in such for self management at discharge. 12/11/20: Met     · Long Term Goals: To be accomplished in  8-12  treatments:  1. Pt will demonstrate an increased FOTO score to 72/100 in order to improve function  2. Pt will report a +4 on GROC in order to assist with pain free ADLs/IADLs  3. Pt will demonstrate increased pain free AROM into R cervical rotation to >/= 65 deg in order to more easily look over shoulder while driving  4.  Pt will test negative for ULTT on the ulnar nerve on 2 consecutive occasions in order to improve neurodynamic mobility and assist in a return to function 12/18/20: Not Met     PLAN  [x]  Upgrade activities as tolerated yes Continue plan of care   []  Discharge due to :    []  Other:      Therapist: Julio C Lopez PTA    Date: 12/18/2020 Time: 7:57 AM     Future Appointments   Date Time Provider Julio Porter   12/22/2020 10:45 AM Liam Kaur PTA [facility]   12/24/2020  7:00 AM Anne Marie Boone [facility]   12/28/2020  4:00 PM Randall Meier [facility]   12/30/2020  3:45 PM Liam Kaur PTA [facility]   1/4/2021  7:50 AM MD MAXINE BarryMO [facility] AMB   3/16/2021  8:45 AM [facility] AMB   3/23/2021  8:30 AM Ira Benites MD [facility] AMB

## 2020-12-21 ENCOUNTER — APPOINTMENT (OUTPATIENT)
Dept: PHYSICAL THERAPY | Age: 35
End: 2020-12-21
Payer: MEDICAID

## 2020-12-22 ENCOUNTER — APPOINTMENT (OUTPATIENT)
Dept: PHYSICAL THERAPY | Age: 35
End: 2020-12-22
Payer: MEDICAID

## 2020-12-23 ENCOUNTER — APPOINTMENT (OUTPATIENT)
Dept: PHYSICAL THERAPY | Age: 35
End: 2020-12-23
Payer: MEDICAID

## 2020-12-24 ENCOUNTER — HOSPITAL ENCOUNTER (OUTPATIENT)
Dept: PHYSICAL THERAPY | Age: 35
Discharge: HOME | End: 2020-12-24
Payer: MEDICAID

## 2020-12-24 PROCEDURE — 97530 THERAPEUTIC ACTIVITIES: CPT

## 2020-12-24 PROCEDURE — 97110 THERAPEUTIC EXERCISES: CPT

## 2020-12-24 PROCEDURE — 97112 NEUROMUSCULAR REEDUCATION: CPT

## 2020-12-24 NOTE — PROGRESS NOTES
PHYSICAL THERAPY - DAILY TREATMENT NOTE    Patient Name: Mathew Shaw        Date: 2020  : 1985   yes Patient  Verified  Visit #:   5     Insurance: Payor: Jagjit Pham / Plan: Jamilah Hernández / Product Type: Managed Care Medicaid /      In time: 7:05 Out time: 808   Total Treatment Time: 63     Medicare/[facility] Time Tracking (below)   Total Timed Codes (min):  46 1:1 Treatment Time:  46     TREATMENT AREA =  Neck pain [M54.2]  Arm pain, right [M79.601]    SUBJECTIVE  Pain Level (on 0 to 10 scale):  3 / 10   Medication Changes/New allergies or changes in medical history, any new surgeries or procedures?    no  If yes, update Summary List   Subjective Functional Status/Changes:  []  No changes reported   Pt reports she is doing better than when she started, having less pain in the neck, but the neck is stiff and the shoulder is still very painful, as is the R elbow         OBJECTIVE  Modalities Rationale:     decrease pain and increase tissue extensibility to improve patient's ability to improve functional abilities   min [] Estim, type/location:                                      []  att     []  unatt     []  w/US     []  w/ice    []  w/heat    min []  Mechanical Traction: type/lbs                   []  pro   []  sup   []  int   []  cont    []  before manual    []  after manual    min []  Ultrasound, settings/location:      min []  Iontophoresis w/ dexamethasone, location:                                               []  take home patch       []  in clinic   10 min [x]  Ice     [x]  Heat    location/position: R cervical supine, R shoulder, CP to R elbow in supine    min []  Vasopneumatic Device, press/temp:     min []  Other:    [] Skin assessment post-treatment (if applicable):    []  intact    []  redness- no adverse reaction     []redness  adverse reaction:        22 min Therapeutic Exercise:  [x]  See flow sheet   Rationale:       increase ROM and increase strength to improve the patients ability to improve functional abilities       7 min Manual Therapy: SOR/manual traction and STM/tissue mobs to R cervical musculature and R shoulder in supine   Rationale:      decrease pain, increase ROM, increase tissue extensibility, decrease trigger points and increase postural awareness to improve patient's ability to improve functional mobility   The manual therapy interventions were performed at a separate and distinct time from the therapeutic activities interventions. 12 min Therapeutic Activity: [x]  See flow sheet   Rationale:     increase ROM and increase strength to improve the patients ability to perform UE ADL's with increased ease/decreased pain     12 min Neuromuscular Re-ed: [x]  See flow sheet   Rationale:     increase strength and improve coordination to improve the patients ability to improve UE coordination and postural muscle recruitment     Billed With/As:   [] TE   [] TA   [] Neuro   [] Self Care Patient Education: [x] Review HEP    [] Progressed/Changed HEP based on:   [] positioning   [] body mechanics   [] transfers   [] heat/ice application    [] other:      Other Objective/Functional Measures:  See PN for details   Post Treatment Pain Level (on 0 to 10) scale:   3 / 10     ASSESSMENT  Assessment/Changes in Function:   Pt was able to tolerate typical program today without complaint. ER at wall was added to improve scapular dynamic stability, and doorway stretch was modified to low position due to pt reporting shoulder pain at 90/90 position. Cold pack was applied to elbow to address possible ulnar nerve irritation. Pt edu re ulnar nerve irritation can be due to resting elbow on hard surface for prolonged period as in typing. Pt verbalized understanding.       []  See Progress Note/Recertification   Patient will continue to benefit from skilled PT services to modify and progress therapeutic interventions, address functional mobility deficits, address ROM deficits, address strength deficits, analyze and address soft tissue restrictions, analyze and cue movement patterns, analyze and modify body mechanics/ergonomics, assess and modify postural abnormalities and instruct in home and community integration to attain remaining goals.    Progress toward goals / Updated goals:  See PN     PLAN  [x]  Upgrade activities as tolerated yes Continue plan of care   []  Discharge due to :    []  Other:      Therapist: Jay Blake    Date: 12/24/2020 Time: 7:57 AM     Future Appointments   Date Time Provider Julio Porter   12/28/2020  4:00 PM Shanell Conti [facility]   12/30/2020  3:45 PM Tara Anaya PTA [facility]   1/4/2021  7:50 AM Pecola Seip, MD [facility] AMB   3/16/2021  8:45 AM [facility] AMB   3/23/2021  8:30 AM Mateus Benites MD [facility] BS AMB

## 2020-12-24 NOTE — PROGRESS NOTES
[id number] PHYSICAL THERAPY  [address], [address] - Phone: [phone]  Fax: [phone]  PROGRESS NOTE  Patient Name: Romana Steele : 1985   Treatment/Medical Diagnosis: Neck pain [M54.2]  Arm pain, right [M79.601]   Referral Source: Bertha Marquez MD     Date of Initial Visit: 20 Attended Visits: 5 Missed Visits: 0     SUMMARY OF TREATMENT  Pt is a 38year old F referred for tx of Neck and R arm pain. Treatment has consisted of the following: Therapeutic exercise, Therapeutic activities, Neuromuscular re-education, Physical agent/modality, Manual therapy, Patient education, Self Care training, Functional mobility training, Home safety training. CURRENT STATUS  At this point Pt has attended 1 eval and 4 follow up visits due to inc time for scheduling transferring from one clinic to another. Pt demonstrates slowly improving shoulder ROM which has likely contributed to improvements in sx. Pt cervical ROM has not changed at this point, however it is no longer painful, it is only stiff. The most pain she has been having is in her shoulder and R elbow. In spite of dec FOTO score pt reports improved functional ability to reach over head, she is most limited in reaching back and behind her back. She is progressing appropriately given small number of  Follow up visits. Skilled PT is requesting to continue to continue Pt progress toward PLOF    Functional Gains: decreasing pain in shoulder, dec radicular sx to ring finger, improving reaching overhead, improving  Functional Deficits: difficulty sleeping, difficulty completing work activities, reaching behind her back, and turning her head.  Continued pain in elbow and neck  % improvement: 60%  Pain   Average: 3-4/10                  Best: 3/10                Worst:7-8 /10  Patient Goal: \"for the pain in my arm to go away\"     AROM/PROM C-S  Active Movements:   ROM  AROM Comments:pain, area Forward flexion:  WFL    Extension 20    SB right  WFL    SB left  WFL    Rotation right 55    Rotation left 65       R GH joint AROM/PROM flex 125/150 P! abd 140/170 P! ER 70/80 P! IR 60/65 P! Strength R GH flex/abd 3-/5 ER/IR 4-/5    Special Tests  + ULTT for reduced ulnar nn mobility RUE, + tinels for ulnar nerve on the R elbow, hypomobility at CT junction    Functional Tests unable to reach back/behind to dress, difficulty with turning head to drive  FOTO score= 52/483 (-5 points from [facility])    Goal/Measure of Progress Goal Met? 1. Pt will be compliant with HEP for symptom management at home and independent in such for self management at discharge   Status at last Eval: Issued Current Status: Compliant and I with initial HEP yes, needs progress   2. Pt will demonstrate an increased FOTO score to 72/100 in order to improve function   Status at last Eval: 58 Current Status: 53 no, mild regression   3. Pt will report a +4 on GROC in order to assist with pain free ADLs/IADLs   Status at last Eval: na Current Status: 3 no, near met     4.   Pt will demonstrate increased pain free AROM into R cervical rotation to >/= 65 deg in order to more easily look over shoulder while driving   Status at last Eval: 55 deg, no pain Current Status: 55 deg, no pain no, progressing   5. Pt will test negative for ULTT on the ulnar nerve on 2 consecutive occasions in order to improve neurodynamic mobility and assist in a return to function   Status at last Eval: Positive ulnar ULTT Current Status: Positive ulnar ULTT, dec severity of sx no, progressing       New Goals to be achieved in __8__  treatments: continue toward unmet goals  1. Pt will be compliant with long term HEP for symptom management at home and independent in such for self management at discharge.   2. Pt will demonstrate an increased FOTO score to 72/100 in order to improve function  3. Pt will report a +4 on GROC in order to assist with pain free ADLs/IADLs  4. Pt will demonstrate increased pain free AROM into R cervical rotation to >/= 65 deg in order to more easily look over shoulder while driving  5. Pt will test negative for ULTT on the ulnar nerve on 2 consecutive occasions in order to improve neurodynamic mobility and assist in a return to function     RECOMMENDATIONS  Continue with skilled care for 2x4 weeks  If you have any questions/comments please contact us directly at [phone]. Thank you for allowing us to assist in the care of your patient. Therapist Signature: Bertha Nowak Date: 12/24/2020     Time: 7:40 AM   NOTE TO PHYSICIAN:  PLEASE COMPLETE THE ORDERS BELOW AND FAX TO   [facility] [facility]: [phone]  If you are unable to process this request in 24 hours please contact our office: [phone]    ___ I have read the above report and request that my patient continue as recommended.   ___ I have read the above report and request that my patient continue therapy with the following changes/special instructions:_________________________________________________________   ___ I have read the above report and request that my patient be discharged from therapy.      Physician Signature:        Date:       Time:

## 2020-12-28 ENCOUNTER — HOSPITAL ENCOUNTER (OUTPATIENT)
Dept: PHYSICAL THERAPY | Age: 35
Discharge: HOME | End: 2020-12-28
Payer: MEDICAID

## 2020-12-28 PROCEDURE — 97110 THERAPEUTIC EXERCISES: CPT

## 2020-12-28 PROCEDURE — 97530 THERAPEUTIC ACTIVITIES: CPT

## 2020-12-28 PROCEDURE — 97112 NEUROMUSCULAR REEDUCATION: CPT

## 2020-12-28 NOTE — PROGRESS NOTES
PHYSICAL THERAPY - DAILY TREATMENT NOTE    Patient Name: Chuck Rodrigez        Date: 2020  : 1985   yes Patient  Verified  Visit #:   6     Insurance: Payor: Eric Chau / Plan: Augustina Singh / Product Type: Managed Care Medicaid /      In time: 4:03 Out time: 5:06   Total Treatment Time: 63     Medicare/[employer] Time Tracking (below)   Total Timed Codes (min):  46 1:1 Treatment Time:  46     TREATMENT AREA =  Neck pain [M54.2]  Arm pain, right [M79.601]    SUBJECTIVE  Pain Level (on 0 to 10 scale):  2  / 10   Medication Changes/New allergies or changes in medical history, any new surgeries or procedures?    no  If yes, update Summary List   Subjective Functional Status/Changes:  []  No changes reported   My neck and back feels really good today, I have had most of the pain in the center of my neck as well as going down the back of my arm to my elbow that comes and goes. OBJECTIVE  Modalities Rationale:     decrease pain and increase tissue extensibility to improve patient's ability to improve functional abilities    min [] Estim, type/location:                                      []  att     []  unatt     []  w/US     []  w/ice    []  w/heat    min []  Mechanical Traction: type/lbs                   []  pro   []  sup   []  int   []  cont    []  before manual    []  after manual    min []  Ultrasound, settings/location:      min []  Iontophoresis w/ dexamethasone, location:                                               []  take home patch       []  in clinic   10 min []  Ice     [x]  Heat    location/position: R cervical/shoulder in sitting     min []  Vasopneumatic Device, press/temp:     min []  Other:    [] Skin assessment post-treatment (if applicable):    []  intact    []  redness- no adverse reaction     []redness  adverse reaction:          22 min Therapeutic Exercise:  [x]?   See flow sheet   Rationale:       increase ROM and increase strength to improve the patients ability to improve functional abilities       7 min Manual Therapy: SOR/manual traction and manual R upper trap stretching in supine   Rationale:      decrease pain, increase ROM, increase tissue extensibility, decrease trigger points and increase postural awareness to improve patient's ability to improve functional mobility   The manual therapy interventions were performed at a separate and distinct time from the therapeutic activities interventions.    16 min Therapeutic Activity: [x]? See flow sheet   Rationale:     increase ROM and increase strength to improve the patients ability to perform UE ADL's with increased ease/decreased pain      8 min Neuromuscular Re-ed: [x]? See flow sheet   Rationale:     increase strength and improve coordination to improve the patients ability to improve UE coordination and postural muscle recruitment     Billed With/As:   [] TE   [] TA   [] Neuro   [] Self Care Patient Education: [x] Review HEP    [] Progressed/Changed HEP based on:   [] positioning   [] body mechanics   [] transfers   [] heat/ice application    [] other:      Other Objective/Functional Measures:       Post Treatment Pain Level (on 0 to 10) scale:   2 / 10     ASSESSMENT  Assessment/Changes in Function:   Pt presented with chief c/o centralized cervical pain as well as intermittent R UE radicular burning pain from posterior shoulder to elbow especially with overhead AROM and bending her arm. Pt was able to tolerate increased resistance with cervicothoracic stabs as well as supine shoulder PNF exercises with min to mod challenge today. Will continue to progress/advance patient within current POC as tolerated with monitoring symptoms.      []  See Progress Note/Recertification   Patient will continue to benefit from skilled PT services to modify and progress therapeutic interventions, address functional mobility deficits, address ROM deficits, address strength deficits, analyze and address soft tissue restrictions, analyze and cue movement patterns, analyze and modify body mechanics/ergonomics, assess and modify postural abnormalities and instruct in home and community integration to attain remaining goals. Progress toward goals / Updated goals:  New Goals to be achieved in __8__  treatments: continue toward unmet goals  1. Pt will be compliant with long term HEP for symptom management at home and independent in such for self management at discharge. 2. Pt will demonstrate an increased FOTO score to 72/100 in order to improve function  3. Pt will report a +4 on GROC in order to assist with pain free ADLs/IADLs  4. Pt will demonstrate increased pain free AROM into R cervical rotation to >/= 65 deg in order to more easily look over shoulder while driving  5.  Pt will test negative for ULTT on the ulnar nerve on 2 consecutive occasions in order to improve neurodynamic mobility and assist in a return to function      PLAN  [x]  Upgrade activities as tolerated yes Continue plan of care   []  Discharge due to :    []  Other:      Therapist: Ck Tellez PTA    Date: 12/28/2020 Time: 4:08 PM     Future Appointments   Date Time Provider Julio Porter   12/30/2020  3:45 PM [place] [facility]   1/4/2021  7:50 AM Cristofer Sal MD [facility] AMB   1/5/2021  7:45 AM Eletha Sarar, PTA [facility]   1/7/2021  7:00 AM Eletha Huger, PTA [facility]   1/11/2021  5:30 PM Eletha Huger, PTA [facility]   1/13/2021  5:15 PM Eletha Huger, PTA [facility]   1/18/2021  4:45 PM Eletha Huger, PTA [facility]   1/20/2021  4:30 PM Eletha Huger, PTA [facility]   1/25/2021  4:15 PM Leydi Bell, PT [facility]   1/27/2021  4:30 PM Olivia Waldrop, PTA [facility]   3/16/2021  8:45 AM [facility] BS AMB   3/23/2021  8:30 AM Norma Benites MD [facility] BS AMB

## 2020-12-30 ENCOUNTER — APPOINTMENT (OUTPATIENT)
Dept: PHYSICAL THERAPY | Age: 35
End: 2020-12-30
Payer: MEDICAID

## 2021-01-04 ENCOUNTER — OFFICE VISIT (OUTPATIENT)
Dept: ORTHOPEDIC SURGERY | Age: 36
End: 2021-01-04
Payer: MEDICAID

## 2021-01-04 VITALS
HEIGHT: 61 IN | WEIGHT: 144 LBS | DIASTOLIC BLOOD PRESSURE: 79 MMHG | TEMPERATURE: 98.9 F | BODY MASS INDEX: 27.19 KG/M2 | OXYGEN SATURATION: 99 % | RESPIRATION RATE: 12 BRPM | SYSTOLIC BLOOD PRESSURE: 122 MMHG | HEART RATE: 74 BPM

## 2021-01-04 DIAGNOSIS — G89.29 CHRONIC RIGHT SHOULDER PAIN: Primary | ICD-10-CM

## 2021-01-04 DIAGNOSIS — M25.511 CHRONIC RIGHT SHOULDER PAIN: Primary | ICD-10-CM

## 2021-01-04 DIAGNOSIS — M25.521 RIGHT ELBOW PAIN: ICD-10-CM

## 2021-01-04 DIAGNOSIS — R29.898 RIGHT ARM WEAKNESS: ICD-10-CM

## 2021-01-04 DIAGNOSIS — M62.838 MUSCLE SPASM: ICD-10-CM

## 2021-01-04 DIAGNOSIS — M79.18 MYOFASCIAL PAIN: ICD-10-CM

## 2021-01-04 PROCEDURE — 99214 OFFICE O/P EST MOD 30 MIN: CPT | Performed by: PHYSICAL MEDICINE & REHABILITATION

## 2021-01-04 NOTE — LETTER
1/5/2021 Patient: Sallie Cerda YOB: 1985 Date of Visit: 1/4/2021 Ruthie Farley MD 
[address] [place] [address] [address] [address] [address] Fax: [phone] Dear Ruthie Farley MD, Thank you for referring Ms. Melissa Vásquez to [facility] for evaluation. My notes for this consultation are attached. If you have questions, please do not hesitate to call me. I look forward to following your patient along with you. Sincerely, Sonam Parkinson MD

## 2021-01-04 NOTE — PATIENT INSTRUCTIONS
Shoulder Arthritis: Exercises Introduction Here are some examples of exercises for you to try. The exercises may be suggested for a condition or for rehabilitation. Start each exercise slowly. Ease off the exercises if you start to have pain. You will be told when to start these exercises and which ones will work best for you. How to do the exercises Shoulder flexion (lying down) To make a wand for this exercise, use a piece of PVC pipe or a broom handle with the broom removed. Make the wand about a foot wider than your shoulders. 1. Lie on your back, holding a wand with both hands. Your palms should face down as you hold the wand. 2. Keeping your elbows straight, slowly raise your arms over your head. Raise them until you feel a stretch in your shoulders, upper back, and chest. 
3. Hold for 15 to 30 seconds. 4. Repeat 2 to 4 times. Shoulder rotation (lying down) To make a wand for this exercise, use a piece of PVC pipe or a broom handle with the broom removed. Make the wand about a foot wider than your shoulders. 1. Lie on your back. Hold a wand with both hands with your elbows bent and palms up. 2. Keep your elbows close to your body, and move the wand across your body toward the sore arm. 3. Hold for 8 to 12 seconds. 4. Repeat 2 to 4 times. Shoulder internal rotation with towel 1. Hold a towel above and behind your head with the arm that is not sore. 2. With your sore arm, reach behind your back and grasp the towel. 3. With the arm above your head, pull the towel upward. Do this until you feel a stretch on the front and outside of your sore shoulder. 4. Hold 15 to 30 seconds. 5. Repeat 2 to 4 times. Shoulder blade squeeze 1. Stand with your arms at your sides, and squeeze your shoulder blades together. Do not raise your shoulders up as you squeeze. 2. Hold 6 seconds. 3. Repeat 8 to 12 times. Resisted rows For this exercise, you will need elastic exercise material, such as surgical tubing or Thera-Band. 1. Put the band around a solid object at about waist level. (A bedpost will work well.) Each hand should hold an end of the band. 2. With your elbows at your sides and bent to 90 degrees, pull the band back. Your shoulder blades should move toward each other. Return to the starting position. 3. Repeat 8 to 12 times. External rotator strengthening exercise 1. Start by tying a piece of elastic exercise material to a doorknob. You can use surgical tubing or Thera-Band. (You may also hold one end of the band in each hand.) 2. Stand or sit with your shoulder relaxed and your elbow bent 90 degrees. Your upper arm should rest comfortably against your side. Squeeze a rolled towel between your elbow and your body for comfort. This will help keep your arm at your side. 3. Hold one end of the elastic band with the hand of the painful arm. 4. Start with your forearm across your belly. Slowly rotate the forearm out away from your body. Keep your elbow and upper arm tucked against the towel roll or the side of your body until you begin to feel tightness in your shoulder. Slowly move your arm back to where you started. 5. Repeat 8 to 12 times. Internal rotator strengthening exercise 1. Start by tying a piece of elastic exercise material to a doorknob. You can use surgical tubing or Thera-Band. 2. Stand or sit with your shoulder relaxed and your elbow bent 90 degrees. Your upper arm should rest comfortably against your side. Squeeze a rolled towel between your elbow and your body for comfort. This will help keep your arm at your side. 3. Hold one end of the elastic band in the hand of the painful arm. 4. Slowly rotate your forearm toward your body until it touches your belly. Slowly move it back to where you started. 5. Keep your elbow and upper arm firmly tucked against the towel roll or at your side. 6. Repeat 8 to 12 times. Pendulum swing If you have pain in your back, do not do this exercise. 1. Hold on to a table or the back of a chair with your good arm. Then bend forward a little and let your sore arm hang straight down. This exercise does not use the arm muscles. Rather, use your legs and your hips to create movement that makes your arm swing freely. 2. Use the movement from your hips and legs to guide the slightly swinging arm back and forth like a pendulum (or elephant trunk). Then guide it in circles that start small (about the size of a dinner plate). Make the circles a bit larger each day, as your pain allows. 3. Do this exercise for 5 minutes, 5 to 7 times each day. 4. As you have less pain, try bending over a little farther to do this exercise. This will increase the amount of movement at your shoulder. Follow-up care is a key part of your treatment and safety. Be sure to make and go to all appointments, and call your doctor if you are having problems. It's also a good idea to know your test results and keep a list of the medicines you take. Where can you learn more? Go to http://www.gray.com/ Enter H562 in the search box to learn more about \"Shoulder Arthritis: Exercises. \" Current as of: March 2, 2020               Content Version: 12.6 © 4839-2642 [employer]. Care instructions adapted under license by [employer] (which disclaims liability or warranty for this information). If you have questions about a medical condition or this instruction, always ask your healthcare professional. [place] [address] any warranty or liability for your use of this information. Elbow: Exercises Introduction Here are some examples of exercises for you to try. The exercises may be suggested for a condition or for rehabilitation. Start each exercise slowly. Ease off the exercises if you start to have pain. You will be told when to start these exercises and which ones will work best for you. How to do the exercises Wrist flexor stretch 1. Extend your arm in front of you with your palm up. 2. Bend your wrist, pointing your hand toward the floor. 3. With your other hand, gently bend your wrist farther until you feel a mild to moderate stretch in your forearm. 4. Hold for at least 15 to 30 seconds. Repeat 2 to 4 times. Wrist extensor stretch 1. Repeat steps 1 to 4 of the stretch above but begin with your extended hand palm down. Ball or sock squeeze 1. Hold a tennis ball (or a rolled-up sock) in your hand. 2. Make a fist around the ball (or sock) and squeeze. 3. Hold for about 6 seconds, and then relax for up to 10 seconds. 4. Repeat 8 to 12 times. 5. Switch the ball (or sock) to your other hand and do 8 to 12 times. Wrist deviation 1. Sit so that your arm is supported but your hand hangs off the edge of a flat surface, such as a table. 2. Hold your hand out like you are shaking hands with someone. 3. Move your hand up and down. 4. Repeat this motion 8 to 12 times. 5. Switch arms. 6. Try to do this exercise twice with each hand. Wrist curls 1. Place your forearm on a table with your hand hanging over the edge of the table, palm up. 2. Place a 1- to 2-pound weight in your hand. This may be a dumbbell, a can of food, or a filled water bottle. 3. Slowly raise and lower the weight while keeping your forearm on the table and your palm facing up. 4. Repeat this motion 8 to 12 times. 5. Switch arms, and do steps 1 through 4. 
6. Repeat with your hand facing down toward the floor. Switch arms. Biceps curls 1. Sit leaning forward with your legs slightly spread and your left hand on your left thigh. 2. Place your right elbow on your right thigh, and hold the weight with your forearm horizontal. 
3. Slowly curl the weight up and toward your chest. 
4. Repeat this motion 8 to 12 times. 5. Switch arms, and do steps 1 through 4. Follow-up care is a key part of your treatment and safety. Be sure to make and go to all appointments, and call your doctor if you are having problems. It's also a good idea to know your test results and keep a list of the medicines you take. Where can you learn more? Go to http://www.gray.com/ Enter M279 in the search box to learn more about \"Elbow: Exercises. \" Current as of: March 2, 2020               Content Version: 12.6 © 4144-8961 [employer]. Care instructions adapted under license by [employer] (which disclaims liability or warranty for this information). If you have questions about a medical condition or this instruction, always ask your healthcare professional. [address] any warranty or liability for your use of this information.

## 2021-01-04 NOTE — PROGRESS NOTES
[facility]  [address] [place], [address]  Phone: [phone]  Fax: [phone]    Pt's YOB: 1985    ASSESSMENT   Diagnoses and all orders for this visit:    1. Chronic right shoulder pain  -     MRI SHOULDER RT WO CONT; Future    2. Right arm weakness  -     MRI SHOULDER RT WO CONT; Future  -     XR ELBOW RT MIN 3 V; Future    3. Right elbow pain  -     XR ELBOW RT MIN 3 V; Future    4. Myofascial pain  -     MRI SHOULDER RT WO CONT; Future    5. Muscle spasm  -     MRI SHOULDER RT WO CONT; Future         IMPRESSION AND PLAN:  Kenneth Muniz is a 38 y.o. right hand dominant female with history of cervical pain/ She reports improvement in her neck and right shoulder pain with physical therapy but reports continued pain in the right elbow (unchanged since her last office visit). Pt admits that she sleeps better at night and experiences less pain when taking Naprosyn. 1) Pt was given information on shoulder and elbow exercises. 2) She will continue with physical therapy for her cervical and shoulder pain and to improve function / ROM. 3) Right elbow x-rays were ordered to further assess her elbow pain  4) A right shoulder MRI was ordered. She has persistent right shoulder pain and limited range of motion despite physical therapy and NSAID's-- concern for labral tear / tendonitis. 5) Ms. Roberto Persaud has a reminder for a \"due or due soon\" health maintenance. I have asked that she contact her primary care provider, Albino Bautista MD, for follow-up on this health maintenance. 6)  demonstrated consistency with prescribing.   7) Pt will continue taking Naproxen as needed -- gi precautions given-- she does not need a refill at this time  8) Pt will remain off of gabapentin at this time until further information is obtained about her shoulder and elbow  9) Consider referral to orthopedics (different provider) pending results of the diagnostic studies  10) Pt recommended to take Vit D3 and zinc for immune benefit  Follow-up and Dispositions    · Return in about 4 weeks (around 2/1/2021) for Diagnostic Test follow up, PT follow up, Medication follow up. HISTORY OF PRESENT ILLNESS:  Benny Lucas is a 38 y.o. right hand dominant female with history of cervical pain and presents to the office today for follow up. She reports improvement in her neck and right shoulder pain with physical therapy but reports continued pain in the right elbow (unchanged since her last office visit). She denies any exciting injuries and notes that she woke up in excruciating pain in 9/2020 and went to the ER to rule out a CVA and myocardial infarction. Her right elbow pain is more severe with activity, particularly with flexion based activity. She reports that she was previously followed by Dr. Desirae Mckeon at [facility] for neck, right shoulder, and right elbow pain but will not be following back up with him since she was not particularly interested in a steroid injection. She admits that she sleeps better at night and experiences less pain when taking Naprosyn. Pt notes that she is no longer taking Neurontin. Pt at this time desires to proceed with a right shoulder MRI and right elbow x-rays.     Of note, she works at [employer].    Pain Scale: 2/10    PCP: Lonny Eason MD     Past Medical History:   Diagnosis Date    Anemia     Hemorrhoids     Joint pain     Muscle pain         Social History     Socioeconomic History    Marital status: UNKNOWN     Spouse name: Not on file    Number of children: Not on file    Years of education: Not on file    Highest education level: Not on file   Occupational History    Not on file   Social Needs    Financial resource strain: Not on file    Food insecurity     Worry: Not on file     Inability: Not on file    Transportation needs     Medical: Not on file     Non-medical: Not on file Tobacco Use    Smoking status: Never Smoker    Smokeless tobacco: Never Used   Substance and Sexual Activity    Alcohol use: No    Drug use: No    Sexual activity: Yes     Partners: Male   Lifestyle    Physical activity     Days per week: Not on file     Minutes per session: Not on file    Stress: Not on file   Relationships    Social connections     Talks on phone: Not on file     Gets together: Not on file     Attends [employer] service: Not on file     Active member of club or organization: Not on file     Attends meetings of clubs or organizations: Not on file     Relationship status: Not on file    Intimate partner violence     Fear of current or ex partner: Not on file     Emotionally abused: Not on file     Physically abused: Not on file     Forced sexual activity: Not on file   Other Topics Concern    Not on file   Social History Narrative    Not on file       Current Outpatient Medications   Medication Sig Dispense Refill    Combivent Respimat  mcg/actuation inhaler INHALE 1 PUFF BY MOUTH TWICE A DAY      albuterol (PROVENTIL HFA, VENTOLIN HFA, PROAIR HFA) 90 mcg/actuation inhaler PLEASE SEE ATTACHED FOR DETAILED DIRECTIONS      Symbicort 80-4.5 mcg/actuation HFAA INHALE 1 PUFF BY MOUTH TWICE A DAY RINSE MOUTH AFTER USE      lidocaine (LIDODERM) 5 % 1 Patch by TransDERmal route every twenty-four (24) hours.  methocarbamoL (ROBAXIN) 750 mg tablet Take 750 mg by mouth every four (4) hours as needed.  montelukast (SINGULAIR) 10 mg tablet TAKE 1 TABLET BY MOUTH EVERYDAY AT BEDTIME      ergocalciferol (ERGOCALCIFEROL) 50,000 unit capsule Take 1 Cap by mouth every seven (7) days. 12 Cap 0    meloxicam (MOBIC) 15 mg tablet TAKE 1 TAB BY MOUTH DAILY AS NEEDED FOR PAIN. TAKE WITH FOOD  1    methocarbamoL (ROBAXIN) 500 mg tablet Take 1 tab by mouth BID-TID as needed for muscle spasm. 60 Tab 1    naproxen (NAPROSYN) 500 mg tablet Take 1 Tab by mouth two (2) times daily (with meals). 60 Tab 1    Cholecalciferol, Vitamin D3, (VITAMIN D3) 2,000 unit cap capsule Take  by mouth two (2) times a day. No Known Allergies      REVIEW OF SYSTEMS    Constitutional: Negative for fever, chills, or weight change. Respiratory: Negative for cough or shortness of breath. Cardiovascular: Negative for chest pain or palpitations. Gastrointestinal: Negative for acid reflux, change in bowel habits, or constipation. Genitourinary: Negative for dysuria and flank pain. Musculoskeletal: Positive for right shoulder and elbow pain. Neurological: Negative for headaches, dizziness, or numbness. Psychiatric/Behavioral: Positive for difficulty with sleep at times. As per HPI    PHYSICAL EXAMINATION  Visit Vitals  /79 (BP 1 Location: Right arm, BP Patient Position: Sitting)   Pulse 74   Temp 98.9 °F (37.2 °C) (Oral)   Resp 12   Ht 5' 1\" (1.549 m)   Wt 144 lb (65.3 kg)   LMP 10/06/2014   SpO2 99% Comment: RA   BMI 27.21 kg/m²       Constitutional: Awake, alert, and in no acute distress. Neurological: 1+ symmetrical DTRs in the upper extremities. 1+ symmetrical DTRs in the lower extremities. Sensation to light touch is intact. Negative Suarez's sign bilaterally. Skin: warm, dry, and intact. Musculoskeletal: Tight across the upper trapezius bilaterally. Pain with internal rotation of the right shoulder. Mild weakness with the empty can test on the right; negative on the left. Weakness with resisted abduction and adduction on the right; good strength on the left. Pain with abduction of the right arm >90 degrees. Positiive impingement sign on the right. Pain with flexion of the right elbow. Biceps  Triceps Deltoids Wrist Ext Wrist Flex Hand Intrin   Right +4/5 +4/5 +4/5 +4/5 +4/5 +4/5   Left +4/5 +4/5 +4/5 +4/5 +4/5 +4/5     IMAGING:    Cervical spine MRI from 10/1/2020 was personally reviewed with the patient and demonstrated:  FINDINGS:     Postoperative changes: None.     Alignment: Straightening of upper cervical lordotic curvature. .    Vertebral body heights: Normal.    Marrow signal: Normal.    Cord: Normal.    Cerebellar tonsils: No Chiari 1 malformation. Soft tissues: Normal.     Correlation of axial and sagittal data through the disc levels:    -C2-3: No significant disc pathology. Facet joints appear normal..No spinal canal or foraminal stenosis. -C3-4: No significant disc pathology. Facet joints appear normal..No spinal canal or foraminal stenosis. -C4-5: No significant disc pathology. Facet joints appear normal..No spinal canal or foraminal stenosis. -C5-6: Disc height is mildly diminished with slight disc bulging. Facet joints appear normal.. No spinal canal or foraminal stenosis. -C6-7: No significant disc pathology. Facet joints appear normal..No spinal canal or foraminal stenosis. -C7-T1: No significant disc pathology. Facet joints appear normal.. The small ossified body projecting in the C7-T1 superior left neural foramen on the prior radiographs is difficult to visualize on the MR images. It is possibly related to the facet on series 3 image 4. No significant spinal canal or foraminal stenosis. IMPRESSION  No large disc herniation, cord compression or cord signal abnormality. No foraminal stenosis or nerve root compression seen. Previously reported ossified density in the C7-T1 left neural foramen difficult to visualize as discussed above but of doubtful clinical significance given the patient's reported right side arm pain and weakness.     Cervical spine x-rays from 9/18/2020 were personally reviewed with the patient and demonstrated:  FINDINGS:    VERTEBRAE AND ALIGNMENT: No fracture or subluxation is seen. FACET JOINTS AND FORAMINA: Calcific opacity is seen extending from the left C7-T1 facet joint with moderate narrowing of the left foramen as seen on the oblique view    PARASPINOUS SOFT TISSUES: Unremarkable.     ADDITIONAL: Radiolucency is seen surrounding a left mandibular first molar, suggesting periapical abscess. _______________    IMPRESSION    No fracture or subluxation. Unusual calcific opacity narrowing the left C7-T1 foramen superiorly, suspect elongated facet osteophyte or less likely partially calcified synovial cyst. Consider follow-up dedicated MR cervical spine especially if left C8 radiculopathy symptoms. Findings suspicious for left mandibular first molar periapical abscess.      Right shoulder x-rays from 9/16/2020 were personally reviewed with the patient and demonstrated:  FINDINGS:    BONES: Intact and normally aligned. SOFT TISSUES: Unremarkable.    _______________    IMPRESSION    No significant abnormality.     Right upper extremity EMG from 11/25/2020 were personally reviewed with the patient and demonstrated:  NCV & EMG Findings:  All nerve conduction studies (as indicated in the following tables) were within normal limits.       All examined muscles (as indicated in the following table) showed no evidence of electrical instability.       INTERPRETATION  This was a normal nerve conduction and EMG study showing there to be no signs of neuropathy, myopathy, or radiculopathy in the nerves and muscles tested.      CLINICAL INTERPRETATION  Her electrodiagnostic findings do not appear to explain her right arm symptoms.       Written by Melva Perera, as dictated by Jero Lobato MD.  I, Dr. Jero oLbato confirm that all documentation is accurate.

## 2021-01-05 ENCOUNTER — HOSPITAL ENCOUNTER (OUTPATIENT)
Dept: PHYSICAL THERAPY | Age: 36
Discharge: HOME | End: 2021-01-05
Payer: MEDICAID

## 2021-01-05 PROCEDURE — 97110 THERAPEUTIC EXERCISES: CPT

## 2021-01-05 PROCEDURE — 97112 NEUROMUSCULAR REEDUCATION: CPT

## 2021-01-05 PROCEDURE — 97530 THERAPEUTIC ACTIVITIES: CPT

## 2021-01-05 NOTE — PROGRESS NOTES
PHYSICAL THERAPY - DAILY TREATMENT NOTE    Patient Name: Tray Strong        Date: 2021  : 1985   yes Patient  Verified  Visit #:     Insurance: Payor: Jack Manzo / Plan: Tiny Yarbrough / Product Type: Managed Care Medicaid /      In time: 7:49 Out time: 9:00   Total Treatment Time: 71     Medicare/BCBS Time Tracking (below)   Total Timed Codes (min):  54 1:1 Treatment Time:  41     TREATMENT AREA =  Neck pain [M54.2]  Arm pain, right [M79.601]    SUBJECTIVE  Pain Level (on 0 to 10 scale):  2  / 10   Medication Changes/New allergies or changes in medical history, any new surgeries or procedures?    no  If yes, update Summary List   Subjective Functional Status/Changes:  []  No changes reported   My neck hasn't been feeling as stiff and I don't feel the tingling down into my fingers anymore, but I still feel that burning pain down into my shoulder to the back of my elbow. OBJECTIVE  Modalities Rationale:     decrease pain and increase tissue extensibility to improve patient's ability to improve functional abilities   min [] Estim, type/location:                                      []  att     []  unatt     []  w/US     []  w/ice    []  w/heat    min []  Mechanical Traction: type/lbs                   []  pro   []  sup   []  int   []  cont    []  before manual    []  after manual    min []  Ultrasound, settings/location:      min []  Iontophoresis w/ dexamethasone, location:                                               []  take home patch       []  in clinic   10 min []  Ice     [x]  Heat    location/position: R cervical/shoulder in sitting     min []  Vasopneumatic Device, press/temp:     min []  Other:    [] Skin assessment post-treatment (if applicable):    []  intact    []  redness- no adverse reaction     []redness  adverse reaction:            18 min Therapeutic Exercise:  [x]? ?  See flow sheet   Rationale:       increase ROM and increase strength to improve the patients ability to improve functional abilities       7 min Manual Therapy: SOR/manual traction and manual R upper trap/levator and bilateral cervical rotation stretching in supine   Rationale:      decrease pain, increase ROM, increase tissue extensibility, decrease trigger points and increase postural awareness to improve patient's ability to improve functional mobility   The manual therapy interventions were performed at a separate and distinct time from the therapeutic activities interventions.    18 min Therapeutic Activity: [x]? ?  See flow sheet   Rationale:     increase ROM and increase strength to improve the patients ability to perform UE ADL's with increased ease/decreased pain      18 min Neuromuscular Re-ed: [x]? ?  See flow sheet   Rationale:     increase strength and improve coordination to improve the patients ability to improve UE coordination and postural muscle recruitment        Billed With/As:   [] TE   [] TA   [] Neuro   [] Self Care Patient Education: [x] Review HEP    [] Progressed/Changed HEP based on:   [] positioning   [] body mechanics   [] transfers   [] heat/ice application    [] other:      Other Objective/Functional Measures:       Post Treatment Pain Level (on 0 to 10) scale:   3  / 10     ASSESSMENT  Assessment/Changes in Function:   Pt presents with the most significant improvement with decreased R UE distal radicular numbness/paresthesia down to finger tips as well as improved cervical mobility/decreased stiffness with ADL's since last treatment. Otherwise, Pt is making steady progress with gaining cervical mobility as well as advancing with strengthening and cervicothoracic stabilization within current POC. Will continue to progress/advance patient within current POC as tolerated with monitoring symptoms.      []  See Progress Note/Recertification   Patient will continue to benefit from skilled PT services to modify and progress therapeutic interventions, address functional mobility deficits, address ROM deficits, address strength deficits, analyze and address soft tissue restrictions, analyze and cue movement patterns, analyze and modify body mechanics/ergonomics, assess and modify postural abnormalities and instruct in home and community integration to attain remaining goals. Progress toward goals / Updated goals:  New Goals to be achieved in __8__  treatments: continue toward unmet goals  1. Pt will be compliant with long term HEP for symptom management at home and independent in such for self management at discharge. 2. Pt will demonstrate an increased FOTO score to 72/100 in order to improve function  3. Pt will report a +4 on GROC in order to assist with pain free ADLs/IADLs  4. Pt will demonstrate increased pain free AROM into R cervical rotation to >/= 65 deg in order to more easily look over shoulder while driving 8/8/50: Met, R cervical rotation AROM= 66 degrees (L=74 degrees)  5.  Pt will test negative for ULTT on the ulnar nerve on 2 consecutive occasions in order to improve neurodynamic mobility and assist in a return to function      PLAN  [x]  Upgrade activities as tolerated yes Continue plan of care   []  Discharge due to :    []  Other:      Therapist: Rosmery Arreola PTA    Date: 1/5/2021 Time: 8:02 AM     Future Appointments   Date Time Provider Julio Porter   1/7/2021  7:00 AM Gracie eBe PTA [facility]   1/11/2021  5:30 PM Gracie Bee PTA [facility]   1/13/2021  5:15 PM Gracie Bee PTA [facility]   1/18/2021  4:45 PM Gracie Bee PTA [facility]   1/20/2021  4:30 PM Latrell Meyers [facility]   1/25/2021  4:15 PM Kandi Dawn PT [facility]   1/27/2021  4:30 PM Latrell Meyers [facility]   2/1/2021  7:50 AM Cali Centeno MD [facility]   3/16/2021  8:45 AM [facility]   3/23/2021  8:30 AM Willis Benites MD [facility]

## 2021-01-07 ENCOUNTER — HOSPITAL ENCOUNTER (OUTPATIENT)
Dept: PHYSICAL THERAPY | Age: 36
Discharge: HOME | End: 2021-01-07
Payer: MEDICAID

## 2021-01-07 PROCEDURE — 97112 NEUROMUSCULAR REEDUCATION: CPT

## 2021-01-07 PROCEDURE — 97110 THERAPEUTIC EXERCISES: CPT

## 2021-01-07 PROCEDURE — 97530 THERAPEUTIC ACTIVITIES: CPT

## 2021-01-07 NOTE — PROGRESS NOTES
PHYSICAL THERAPY - DAILY TREATMENT NOTE    Patient Name: Frederick Cheng        Date: 2021  : 1985   yes Patient  Verified  Visit #:     Insurance: Payor: Herman Wheatley / Plan: Afai Bhagat / Product Type: Managed Care Medicaid /      In time: 7:05 Out time: 8:16   Total Treatment Time: 71     Medicare/BCBS Time Tracking (below)   Total Timed Codes (min):  54 1:1 Treatment Time:  53     TREATMENT AREA =  Neck pain [M54.2]  Arm pain, right [M79.601]    SUBJECTIVE  Pain Level (on 0 to 10 scale):  5  / 10   Medication Changes/New allergies or changes in medical history, any new surgeries or procedures?    no  If yes, update Summary List   Subjective Functional Status/Changes:  []  No changes reported   My shoulder and elbow area has been hurting the most since I was here last for some reason, I don't know what it is. The only think that I have done differently is taking down my [employer] decorations. OBJECTIVE  Modalities Rationale:     decrease pain and increase tissue extensibility to improve patient's ability to improve functional abilities    min [] Estim, type/location:                                      []  att     []  unatt     []  w/US     []  w/ice    []  w/heat    min []  Mechanical Traction: type/lbs                   []  pro   []  sup   []  int   []  cont    []  before manual    []  after manual    min []  Ultrasound, settings/location:      min []  Iontophoresis w/ dexamethasone, location:                                               []  take home patch       []  in clinic   10 min []  Ice     [x]  Heat    location/position: Cervical/seated    min []  Vasopneumatic Device, press/temp:     min []  Other:    [] Skin assessment post-treatment (if applicable):    []  intact    []  redness- no adverse reaction     []redness  adverse reaction:          22 min Therapeutic Exercise:  [x]? ??  See flow sheet   Rationale:       increase ROM and increase strength to improve the patients ability to improve functional abilities       7 min Manual Therapy: SOR/manual traction and manual R upper trap/levator and bilateral cervical rotation stretching in supine   Rationale:      decrease pain, increase ROM, increase tissue extensibility, decrease trigger points and increase postural awareness to improve patient's ability to improve functional mobility   The manual therapy interventions were performed at a separate and distinct time from the therapeutic activities interventions.     20 min Therapeutic Activity: [x]???  See flow sheet   Rationale:     increase ROM and increase strength to improve the patients ability to perform UE ADL's with increased ease/decreased pain      12 min Neuromuscular Re-ed: [x]???  See flow sheet   Rationale:     increase strength and improve coordination to improve the patients ability to improve UE coordination and postural muscle recruitment      Billed With/As:   [] TE   [] TA   [] Neuro   [] Self Care Patient Education: [x] Review HEP    [] Progressed/Changed HEP based on:   [] positioning   [] body mechanics   [] transfers   [] heat/ice application    [] other:      Other Objective/Functional Measures:       Post Treatment Pain Level (on 0 to 10) scale:   2-3 / 10     ASSESSMENT  Assessment/Changes in Function:   Pt presented with chief c/o increased R shoulder and elbow pain/symptoms from prolonged repetitive reaching and light lifting activity since last treatment. Pt was able to tolerate full normal therex regiment including addition of lower trap walkouts with min to mod challenge today. Will review detailed progress/goals for physician update for MD follow up after next treatment with continuing to progress/advance within current POC/protocol as tolerated.      []  See Progress Note/Recertification   Patient will continue to benefit from skilled PT services to modify and progress therapeutic interventions, address functional mobility deficits, address ROM deficits, address strength deficits, analyze and address soft tissue restrictions, analyze and cue movement patterns, analyze and modify body mechanics/ergonomics, assess and modify postural abnormalities and instruct in home and community integration to attain remaining goals. Progress toward goals / Updated goals:  New Goals to be achieved in __8__  treatments: continue toward unmet goals  1. Pt will be compliant with long term HEP for symptom management at home and independent in such for self management at discharge. 2. Pt will demonstrate an increased FOTO score to 72/100 in order to improve function  3. Pt will report a +4 on GROC in order to assist with pain free ADLs/IADLs  4. Pt will demonstrate increased pain free AROM into R cervical rotation to >/= 65 deg in order to more easily look over shoulder while driving 9/5/56: Met, R cervical rotation AROM= 66 degrees (L=74 degrees)  5.  Pt will test negative for ULTT on the ulnar nerve on 2 consecutive occasions in order to improve neurodynamic mobility and assist in a return to function      PLAN  [x]  Upgrade activities as tolerated yes Continue plan of care   []  Discharge due to :    []  Other:      Therapist: Magaly Hassan PTA    Date: 1/7/2021 Time: 7:03 AM     Future Appointments   Date Time Provider Julio Porter   1/11/2021  5:30 PM Michell Crow, PTA [facility] [address]   1/13/2021  5:15 PM Michell Crow, PTA [facility] [address]   1/16/2021  6:30 PM [facility] 1 [facility]   1/18/2021  4:45 PM Michell N[place]ch[place], PTA [facility] [address]   1/20/2021  4:30 PM Moe Sang [facility] [address]   1/25/2021  4:15 PM Sarabjit Elena PT [facility] [address]   1/27/2021  4:30 PM Moe Sang [facility] [address]   2/8/2021 12:50 PM Ari Casillas MD [facility]   3/16/2021  8:45 AM [facility]   3/23/2021  8:30 AM Errol Benites MD [facility]

## 2021-01-11 ENCOUNTER — HOSPITAL ENCOUNTER (OUTPATIENT)
Dept: PHYSICAL THERAPY | Age: 36
Discharge: HOME | End: 2021-01-11
Payer: MEDICAID

## 2021-01-11 DIAGNOSIS — R29.898 RIGHT ARM WEAKNESS: ICD-10-CM

## 2021-01-11 DIAGNOSIS — M25.511 CHRONIC RIGHT SHOULDER PAIN: ICD-10-CM

## 2021-01-11 DIAGNOSIS — G89.29 CHRONIC RIGHT SHOULDER PAIN: ICD-10-CM

## 2021-01-11 DIAGNOSIS — M79.18 MYOFASCIAL PAIN: ICD-10-CM

## 2021-01-11 DIAGNOSIS — M62.838 MUSCLE SPASM: ICD-10-CM

## 2021-01-11 PROCEDURE — 97112 NEUROMUSCULAR REEDUCATION: CPT

## 2021-01-11 PROCEDURE — 97530 THERAPEUTIC ACTIVITIES: CPT

## 2021-01-11 PROCEDURE — 97110 THERAPEUTIC EXERCISES: CPT

## 2021-01-11 NOTE — PROGRESS NOTES
PHYSICAL THERAPY - DAILY TREATMENT NOTE    Patient Name: Nikko Esat        Date: 2021  : 1985   yes Patient  Verified  Visit #:     Insurance: Payor: Jesus West / Plan: [employer] / Product Type: Managed Care Medicaid /      In time: 4:03 Out time: 5:09   Total Treatment Time: 66     Medicare/BCBS Time Tracking (below)   Total Timed Codes (min):  49 1:1 Treatment Time:  49     TREATMENT AREA =  Neck pain [M54.2]  Arm pain, right [M79.601]    SUBJECTIVE  Pain Level (on 0 to 10 scale): 3 / 10   Medication Changes/New allergies or changes in medical history, any new surgeries or procedures?    no  If yes, update Summary List   Subjective Functional Status/Changes:  []  No changes reported   My neck feels pretty good today, but I still feel that burning pain in my elbow and they are supposed to do X-rays when I go back to see the doctor in February. OBJECTIVE  Modalities Rationale:     decrease pain and increase tissue extensibility to improve patient's ability to improve functional abilities   min [] Estim, type/location:                                      []  att     []  unatt     []  w/US     []  w/ice    []  w/heat    min []  Mechanical Traction: type/lbs                   []  pro   []  sup   []  int   []  cont    []  before manual    []  after manual    min []  Ultrasound, settings/location:      min []  Iontophoresis w/ dexamethasone, location:                                               []  take home patch       []  in clinic   10 min []  Ice     [x]  Heat    location/position: R cervical/shoulder in sitting     min []  Vasopneumatic Device, press/temp:     min []  Other:    [] Skin assessment post-treatment (if applicable):    []  intact    []  redness- no adverse reaction     []redness  adverse reaction:            21 min Therapeutic Exercise:  [x]? ???  See flow sheet   Rationale:       increase ROM and increase strength to improve the patients ability to improve functional abilities       7 min Manual Therapy: SOR/manual traction and manual R upper trap/levator stretching in supine   Rationale:      decrease pain, increase ROM, increase tissue extensibility, decrease trigger points and increase postural awareness to improve patient's ability to improve functional mobility   The manual therapy interventions were performed at a separate and distinct time from the therapeutic activities interventions.     20 min Therapeutic Activity: [x]????  See flow sheet   Rationale:     increase ROM and increase strength to improve the patient’s ability to perform UE ADL's with increased ease/decreased pain      8 min Neuromuscular Re-ed: [x]????  See flow sheet   Rationale:     increase strength and improve coordination to improve the patient’s ability to improve UE coordination and postural muscle recruitment        Billed With/As:   [] TE   [] TA   [] Neuro   [] Self Care Patient Education: [x] Review HEP    [] Progressed/Changed HEP based on:   [] positioning   [] body mechanics   [] transfers   [] heat/ice application    [] other:      Other Objective/Functional Measures:       Post Treatment Pain Level (on 0 to 10) scale:   2-3  / 10     ASSESSMENT  Assessment/Changes in Function:   Pt presents with only c/o isolated burning pain in R elbow, but no reoccurrence of neck pain since last treatment. Pt was also able to tolerate increased resistance with cervicothoracic stabs from 2 to 3 lbs as well as with all supine theraband resisted shoulder exercises with min to mod challenge today. Pt does present with pinpointed lateral epicondyle pain which suggests possibility of lateral epicondylitis. Pt was educated and given a handout for proper desk ergonomics today.Will continue to progress/advance patient within current POC as tolerated with monitoring symptoms.     []  See Progress Note/Recertification   Patient will continue to benefit from skilled PT services to modify  and progress therapeutic interventions, address functional mobility deficits, address ROM deficits, address strength deficits, analyze and address soft tissue restrictions, analyze and cue movement patterns, analyze and modify body mechanics/ergonomics, assess and modify postural abnormalities and instruct in home and community integration to attain remaining goals. Progress toward goals / Updated goals:  New Goals to be achieved in __8__  treatments: continue toward unmet goals  1. Pt will be compliant with long term HEP for symptom management at home and independent in such for self management at discharge. 2. Pt will demonstrate an increased FOTO score to 72/100 in order to improve function  3. Pt will report a +4 on GROC in order to assist with pain free ADLs/IADLs  4. Pt will demonstrate increased pain free AROM into R cervical rotation to >/= 65 deg in order to more easily look over shoulder while driving 1/5/21: Met, R cervical rotation AROM= 66 degrees (L=74 degrees)  5.  Pt will test negative for ULTT on the ulnar nerve on 2 consecutive occasions in order to improve neurodynamic mobility and assist in a return to function 1/11/21: Met, Pt presents with negative for ULTT  on the ulnar nerve, only pinpointed lateral epicondyle pain which suggests possibility of lateral epicondylitis      PLAN  [x]  Upgrade activities as tolerated yes Continue plan of care   []  Discharge due to :    []  Other:      Therapist: Monique Horta PTA    Date: 1/11/2021 Time: 4:04 PM     Future Appointments   Date Time Provider Julio Porter   1/13/2021  5:15 PM Adrián Horowitz PTA [facility]   1/18/2021  4:45 PM Adrián Horowitz PTA [facility]   1/20/2021  4:30 PM Adrián Horowitz PTA [facility]   1/23/2021 11:00 AM [facility] RM 2 [facility]   1/25/2021  4:15 PM Betsy Hayward PT [facility]   1/27/2021  4:30 PM Alicia Blodgett Mills [facility]   2/8/2021 12:50 PM Wesley Vasquez MD [facility]   3/16/2021  8:45 AM [facility]   3/23/2021  8:30 AM Nixon Benites MD [facility]

## 2021-01-13 ENCOUNTER — HOSPITAL ENCOUNTER (OUTPATIENT)
Dept: PHYSICAL THERAPY | Age: 36
End: 2021-01-13
Payer: MEDICAID

## 2021-01-14 ENCOUNTER — HOSPITAL ENCOUNTER (OUTPATIENT)
Dept: PHYSICAL THERAPY | Age: 36
Discharge: HOME | End: 2021-01-14
Payer: MEDICAID

## 2021-01-14 PROCEDURE — 97530 THERAPEUTIC ACTIVITIES: CPT

## 2021-01-14 PROCEDURE — 97110 THERAPEUTIC EXERCISES: CPT

## 2021-01-14 NOTE — PROGRESS NOTES
PHYSICAL THERAPY - DAILY TREATMENT NOTE    Patient Name: Pati Dinero        Date: 2021  : 1985   yes Patient  Verified  Visit #:   10     Insurance: Payor: Juaquin Smallwood / Plan: Namita Adames / Product Type: Managed Care Medicaid /      In time: 7:08 Out time: 8:13   Total Treatment Time: 65     Medicare/BCBS Time Tracking (below)   Total Timed Codes (min):  48 1:1 Treatment Time:  42     TREATMENT AREA =  Neck pain [M54.2]  Arm pain, right [M79.601]    SUBJECTIVE  Pain Level (on 0 to 10 scale):  3  / 10   Medication Changes/New allergies or changes in medical history, any new surgeries or procedures?    no  If yes, update Summary List   Subjective Functional Status/Changes:  []  No changes reported   My neck feels a little bit stiff in the center, I don't know if I slept wrong or what, but I don't remember doing anything different to flare it up. OBJECTIVE  Modalities Rationale:     decrease pain and increase tissue extensibility to improve patient's ability to improve functional abilities    min [] Estim, type/location:                                      []  att     []  unatt     []  w/US     []  w/ice    []  w/heat    min []  Mechanical Traction: type/lbs                   []  pro   []  sup   []  int   []  cont    []  before manual    []  after manual    min []  Ultrasound, settings/location:      min []  Iontophoresis w/ dexamethasone, location:                                               []  take home patch       []  in clinic   10 min []  Ice     [x]  Heat    location/position: R cervical/supine    min []  Vasopneumatic Device, press/temp:     min []  Other:    [] Skin assessment post-treatment (if applicable):    []  intact    []  redness- no adverse reaction     []redness  adverse reaction:          21 min Therapeutic Exercise:  [x]? ????  See flow sheet   Rationale:       increase ROM and increase strength to improve the patients ability to improve functional abilities       7 min Manual Therapy: SOR/manual traction, cervical P>A and side glide mobs bilaterally, upper cervical releases and manual upper trap stretching bilaterally    Rationale:      decrease pain, increase ROM, increase tissue extensibility, decrease trigger points and increase postural awareness to improve patient's ability to improve functional mobility   The manual therapy interventions were performed at a separate and distinct time from the therapeutic activities interventions.     21 min Therapeutic Activity: [x]?????  See flow sheet   Rationale:     increase ROM and increase strength to improve the patients ability to perform UE ADL's with increased ease/decreased pain        Billed With/As:   [] TE   [] TA   [] Neuro   [] Self Care Patient Education: [x] Review HEP    [] Progressed/Changed HEP based on:   [] positioning   [] body mechanics   [] transfers   [] heat/ice application    [] other:      Other Objective/Functional Measures:       Post Treatment Pain Level (on 0 to 10) scale:   2  / 10     ASSESSMENT  Assessment/Changes in Function:   Pt presented with chief c/o centralized cervical stiffness/decreased mobility with no specific change/increase in activity since last treatment except for possible irregular sleep positioning. Pt was able to advance to increased resistance with theraband postural strengthening exercises with min to mod challenge today. Pt also reported increased relief of centralized cervical pain/symptoms with addition of cervical mobilization with manual intervention today. Will continue to progress/advance patient within current POC as tolerated with monitoring symptoms.      []  See Progress Note/Recertification   Patient will continue to benefit from skilled PT services to modify and progress therapeutic interventions, address functional mobility deficits, address ROM deficits, address strength deficits, analyze and address soft tissue restrictions, analyze and cue movement patterns, analyze and modify body mechanics/ergonomics, assess and modify postural abnormalities and instruct in home and community integration to attain remaining goals. Progress toward goals / Updated goals:  New Goals to be achieved in __8__  treatments: continue toward unmet goals  1. Pt will be compliant with long term HEP for symptom management at home and independent in such for self management at discharge. 2. Pt will demonstrate an increased FOTO score to 72/100 in order to improve function  3. Pt will report a +4 on GROC in order to assist with pain free ADLs/IADLs  4. Pt will demonstrate increased pain free AROM into R cervical rotation to >/= 65 deg in order to more easily look over shoulder while driving 1/5/21: Met, R cervical rotation AROM= 66 degrees (L=74 degrees)  5.  Pt will test negative for ULTT on the ulnar nerve on 2 consecutive occasions in order to improve neurodynamic mobility and assist in a return to function 1/11/21: Met, Pt presents with negative for ULTT  on the ulnar nerve, only pinpointed lateral epicondyle pain which suggests possibility of lateral epicondylitis      PLAN  [x]  Upgrade activities as tolerated yes Continue plan of care   []  Discharge due to :    []  Other:      Therapist: Rosmery Arreola PTA    Date: 1/14/2021 Time: 7:13 AM     Future Appointments   Date Time Provider Julio Porter   1/18/2021  4:45 PM Gracie Bee PTA [facility]   1/20/2021  4:30 PM Gracie Bee PTA [facility]   1/23/2021 11:00 AM [facility] RM 2 [facility]   1/25/2021  4:15 PM Kandi Dawn PT [facility]   1/27/2021  4:30 PM Latrell Meyers [facility]   2/8/2021 12:50 PM Cali Centeno MD [facility]   3/16/2021  8:45 AM [facility]   3/23/2021  8:30 AM Willis Benites MD [facility]

## 2021-01-18 ENCOUNTER — HOSPITAL ENCOUNTER (OUTPATIENT)
Dept: PHYSICAL THERAPY | Age: 36
Discharge: HOME | End: 2021-01-18
Payer: MEDICAID

## 2021-01-18 PROCEDURE — 97110 THERAPEUTIC EXERCISES: CPT

## 2021-01-18 PROCEDURE — 97530 THERAPEUTIC ACTIVITIES: CPT

## 2021-01-18 PROCEDURE — 97112 NEUROMUSCULAR REEDUCATION: CPT

## 2021-01-18 NOTE — PROGRESS NOTES
PHYSICAL THERAPY - DAILY TREATMENT NOTE    Patient Name: Benny Lucas        Date: 2021  : 1985   yes Patient  Verified  Visit #:     Insurance: Payor: Trude Holstein / Plan: [address] [place] [address] [address] / Product Type: Managed Care Medicaid /      In time: 4:50 Out time: 5:55   Total Treatment Time: 65     Medicare/BCBS Time Tracking (below)   Total Timed Codes (min):  48 1:1 Treatment Time:  48     TREATMENT AREA =  Neck pain [M54.2]  Arm pain, right [M79.601]    SUBJECTIVE  Pain Level (on 0 to 10 scale):  4  / 10   Medication Changes/New allergies or changes in medical history, any new surgeries or procedures?    no  If yes, update Summary List   Subjective Functional Status/Changes:  []  No changes reported   My neck has still been stiff and my shoulder gets stiff when it's cold, but I don't know what I am going to do about my elbow because it is still about the same. OBJECTIVE  Modalities Rationale:     decrease pain and increase tissue extensibility to improve patient's ability to improve functional abilities   min [] Estim, type/location:                                      []  att     []  unatt     []  w/US     []  w/ice    []  w/heat    min []  Mechanical Traction: type/lbs                   []  pro   []  sup   []  int   []  cont    []  before manual    []  after manual    min []  Ultrasound, settings/location:      min []  Iontophoresis w/ dexamethasone, location:                                               []  take home patch       []  in clinic   10 min []  Ice     [x]  Heat    location/position: Cervical/seated    min []  Vasopneumatic Device, press/temp:     min []  Other:    [] Skin assessment post-treatment (if applicable):    []  intact    []  redness- no adverse reaction     []redness  adverse reaction:        20 min Therapeutic Exercise:  [x]? ????  See flow sheet   Rationale:       increase ROM and increase strength to improve the patients ability to improve functional abilities       7 min Manual Therapy: SOR/manual traction, cervical P>A and side glide mobs bilaterally, upper cervical releases and manual upper trap stretching bilaterally    Rationale:      decrease pain, increase ROM, increase tissue extensibility, decrease trigger points and increase postural awareness to improve patient's ability to improve functional mobility   The manual therapy interventions were performed at a separate and distinct time from the therapeutic activities interventions.     20 min Therapeutic Activity: [x]?????  See flow sheet   Rationale:     increase ROM and increase strength to improve the patients ability to perform UE ADL's with increased ease/decreased pain      8 min Neuromuscular Re-ed: [x]?????  See flow sheet   Rationale:     increase strength and improve coordination to improve the patients ability to improve UE coordination and postural muscle recruitment          Billed With/As:   [] TE   [] TA   [] Neuro   [] Self Care Patient Education: [x] Review HEP    [] Progressed/Changed HEP based on:   [] positioning   [] body mechanics   [] transfers   [] heat/ice application    [] other:      Other Objective/Functional Measures:     Post Treatment Pain Level (on 0 to 10) scale:   2  / 10     ASSESSMENT  Assessment/Changes in Function:   Pt presented with chief c/o continued centralized cervical and L shoulder stiffness/decreased mobility which as evident with manual intervention today. Ganga Brown is progressing well with gaining cervical mobility as well as advancing with strengthening and cervicothoracic stabilization within current POC. Will continue to progress/advance patient within current POC as tolerated with monitoring symptoms.      []  See Progress Note/Recertification   Patient will continue to benefit from skilled PT services to modify and progress therapeutic interventions, address functional mobility deficits, address ROM deficits, address strength deficits, analyze and address soft tissue restrictions, analyze and cue movement patterns, analyze and modify body mechanics/ergonomics, assess and modify postural abnormalities and instruct in home and community integration to attain remaining goals. Progress toward goals / Updated goals:  New Goals to be achieved in __8__  treatments: continue toward unmet goals  1. Pt will be compliant with long term HEP for symptom management at home and independent in such for self management at discharge. 2. Pt will demonstrate an increased FOTO score to 72/100 in order to improve function  3. Pt will report a +4 on GROC in order to assist with pain free ADLs/IADLs  4. Pt will demonstrate increased pain free AROM into R cervical rotation to >/= 65 deg in order to more easily look over shoulder while driving 1/5/21: Met, R cervical rotation AROM= 66 degrees (L=74 degrees)  5.  Pt will test negative for ULTT on the ulnar nerve on 2 consecutive occasions in order to improve neurodynamic mobility and assist in a return to function 1/11/21: Met, Pt presents with negative for ULTT  on the ulnar nerve, only pinpointed lateral epicondyle pain which suggests possibility of lateral epicondylitis      PLAN  [x]  Upgrade activities as tolerated yes Continue plan of care   []  Discharge due to :    []  Other:      Therapist: Jinny Gomse PTA    Date: 1/18/2021 Time: 4:58 PM     Future Appointments   Date Time Provider Julio Porter   1/23/2021 11:00 AM [facility] RM 2 [facility]   1/25/2021  4:15 PM Nery Arnett, PT [facility]   1/27/2021  4:30 PM Yefri Lam PTA [facility]   2/8/2021 12:50 PM Chasidy Ceron MD [facility]   3/16/2021  8:45 AM [facility]   3/23/2021  8:30 AM Rowan Benites MD [facility]

## 2021-01-20 ENCOUNTER — APPOINTMENT (OUTPATIENT)
Dept: PHYSICAL THERAPY | Age: 36
End: 2021-01-20
Payer: MEDICAID

## 2021-01-23 ENCOUNTER — HOSPITAL ENCOUNTER (OUTPATIENT)
Age: 36
Discharge: HOME | End: 2021-01-23
Attending: PHYSICAL MEDICINE & REHABILITATION
Payer: MEDICAID

## 2021-01-23 ENCOUNTER — HOSPITAL ENCOUNTER (OUTPATIENT)
Dept: GENERAL RADIOLOGY | Age: 36
Discharge: HOME | End: 2021-01-23
Attending: PHYSICAL MEDICINE & REHABILITATION
Payer: MEDICAID

## 2021-01-23 PROCEDURE — 73221 MRI JOINT UPR EXTREM W/O DYE: CPT

## 2021-01-23 PROCEDURE — 73080 X-RAY EXAM OF ELBOW: CPT

## 2021-01-25 ENCOUNTER — HOSPITAL ENCOUNTER (OUTPATIENT)
Dept: PHYSICAL THERAPY | Age: 36
Discharge: HOME | End: 2021-01-25
Payer: MEDICAID

## 2021-01-25 PROCEDURE — 97112 NEUROMUSCULAR REEDUCATION: CPT

## 2021-01-25 PROCEDURE — 97530 THERAPEUTIC ACTIVITIES: CPT

## 2021-01-25 PROCEDURE — 97110 THERAPEUTIC EXERCISES: CPT

## 2021-01-25 NOTE — PROGRESS NOTES
PHYSICAL THERAPY - DAILY TREATMENT NOTE    Patient Name: Jonathan Sale        Date: 2021  : 1985   yes Patient  Verified  Visit #:     Insurance: Payor: Bárbara Mohr / Plan: [address] [place] [address] [address] / Product Type: Managed Care Medicaid /      In time: 4:15 Out time: 5:21   Total Treatment Time: 66     Medicare/BCBS Time Tracking (below)   Total Timed Codes (min):  na 1:1 Treatment Time:  na     TREATMENT AREA =  Neck pain [M54.2]  Arm pain, right [M79.601]    SUBJECTIVE  Pain Level (on 0 to 10 scale):  3  / 10   Medication Changes/New allergies or changes in medical history, any new surgeries or procedures?    no  If yes, update Summary List   Subjective Functional Status/Changes:  []  No changes reported     See DC summary          OBJECTIVE  Modalities Rationale:     decrease pain and increase tissue extensibility to improve patient's ability to complete ADLs   min [] Estim, type/location:                                      []  att     []  unatt     []  w/US     []  w/ice    []  w/heat    min []  Mechanical Traction: type/lbs                   []  pro   []  sup   []  int   []  cont    []  before manual    []  after manual    min []  Ultrasound, settings/location:      min []  Iontophoresis w/ dexamethasone, location:                                               []  take home patch       []  in clinic   10 min []  Ice     [x]  Heat    location/position: To c/s in short sit    min []  Vasopneumatic Device, press/temp:     min []  Other:    [x] Skin assessment post-treatment (if applicable):    [x]  intact    []  redness- no adverse reaction     []redness  adverse reaction:        22 min Therapeutic Exercise:  [x]  See flow sheet   Rationale:      increase ROM, increase strength and improve coordination to improve the patients ability to lift, carry      22 min Therapeutic Activity: [x]  See flow sheet   Rationale:    increase ROM, increase strength and improve coordination to improve the patients ability to lift, carry    12 min Neuromuscular Re-ed: [x]  See flow sheet   Rationale:    increase strength, improve coordination and increase proprioception to improve the patients ability to maintain posture      Billed With/As:   [x] TE   [] TA   [] Neuro   [] Self Care Patient Education: [x] Review HEP    [x] Progressed/Changed HEP based on:  DC today  [] positioning   [] body mechanics   [] transfers   [] heat/ice application    [] other:      Other Objective/Functional Measures:    See DC summary     Post Treatment Pain Level (on 0 to 10) scale:   2 / 10     ASSESSMENT  Assessment/Changes in Function:     See DC summary     []  See Progress Note/Recertification   Patient will continue to benefit from skilled PT services to n/a to attain remaining goals.    Progress toward goals / Updated goals:    See DC summary     PLAN  []  Upgrade activities as tolerated no Continue plan of care   [x]  Discharge due to : Reaching plateau in progres   []  Other:      Therapist: Erskine Sandifer, PT    Date: 1/25/2021 Time: 2:21 PM     Future Appointments   Date Time Provider Julio Porter   1/25/2021  4:15 PM Live Vázquez PT [facility]   1/27/2021  4:30 PM Melany Mckinley [facility]   2/8/2021 12:50 PM Rosette Christopher MD [facility]   3/16/2021  8:45 AM [facility]   3/23/2021  8:30 AM Kash Benites MD [facility]

## 2021-01-25 NOTE — PROGRESS NOTES
[facility] PHYSICAL THERAPY  [address], [place], [address] - Phone: [phone]  Fax: [phone]  DISCHARGE SUMMARY  Patient Name: Waqar Mccain : 1985   Treatment/Medical Diagnosis: Neck pain [M54.2]  Arm pain, right [M79.601]   Referral Source: Libra Quintero MD     Date of Initial Visit: 2020 Attended Visits: 12 Missed Visits: 2     SUMMARY OF TREATMENT  Pt has attended 12 sessions of PT for the tx of c/s, R UE pain. PT tx has consisted of therex, theract, NMRE, and manual tx prn to improve periscapular strength/postural endurance, spinal mobility, cervical flexibility, dec pain, and improve function. CURRENT STATUS  Pt has made slow progress in PT, reporting 75% overall improvement in sx. Pt notes max pain 6/10, avg pain 4-5/10. Pt c/c is dull, aching, burning sensation in the R elbow. Improvements:  Dec stiffness in c/s, dec shoulder pain, absent N/T in the R hand, reaching behind back, overhead, looking over shoulder  Functional limitations: reaching behind back, overhead, lifting weighted objects   Objective:  C/s AROM flex 26, ext 40 \"ache\", R SB 32 p!, L SB 30 p!, RR 76 \"stiff\", LR 56 \"stiff\"  R shoulder AROM flex 145, abd 134 p!, ER 82, IR 70, FIR T8  R shoulder MMT flex 4-/5 p!, abd 3+/5 p!, ER 4-/5 p!, IR 4/5 p!  (-) empty can, (+) speeds, (-) o'briens. (-) ULTT (all bias), (+) neer's sign. (+) shahana edwards  FOTO 69/10, GROC +4    Goal/Measure of Progress Goal Met? 1. Pt will be compliant w/ long-term HEP for sx management at home and independent in such for self management at DC   Status at last Eval: ongoing Current Status: Reports non-compliance no   2. Pt will demo an inc FOTO score to 72/100 to improve function   Status at last Eval: 53/100 Current Status: 69/100 Nearly met   3. Pt will report a +4 on GROC in order to assist w/ pain-free ADLs/IADLs   Status at last Eval: +3 Current Status: +4 yes     4.   Pt will demo inc pain-free AROM into R cervical rotation to >/= 65/100 in order to more easily look over shoulder while driving   Status at last Eval: 55 deg, no pain Current Status: 76 \"stiff\" yes   5. Pt will test negative for ULTT on the ulnar nerve on 2 consecutive occasions in order to improve    Status at last Eval: Positive, dec severity of sx Current Status: negative yes     RECOMMENDATIONS  Pt has attended 2 months of therapy and has appeared to have reached a plateau in progress. Pt does report absent UE N/T, however chief complaint of R elbow and sharp R shoulder pain. Pt presents w/ sx suggesting RC tendinosis/impingement syndrome and could benefit from further assessment/consultation of R shoulder. Pt appropriate for DC at this time; HEP has been updated and pt provided w/ bands. Thank you for this referral.     If you have any questions/comments please contact us directly at [phone]. Thank you for allowing us to assist in the care of your patient. Therapist Signature: Parish Thomas PT Date: 1/25/2021     Time: 2:21 PM   NOTE TO PHYSICIAN:  Your patient's insurance requires this discharge note be signed and returned. PLEASE COMPLETE THE ORDERS BELOW AND RETURN TO:  [facility] @ [phone]    ___ I have read the above report and request that my patient be discharged from therapy.      Physician Signature:        Date:       Time:

## 2021-01-27 ENCOUNTER — APPOINTMENT (OUTPATIENT)
Dept: PHYSICAL THERAPY | Age: 36
End: 2021-01-27
Payer: MEDICAID

## 2021-02-08 ENCOUNTER — OFFICE VISIT (OUTPATIENT)
Dept: ORTHOPEDIC SURGERY | Age: 36
End: 2021-02-08
Payer: MEDICAID

## 2021-02-08 VITALS
BODY MASS INDEX: 27 KG/M2 | TEMPERATURE: 97.9 F | RESPIRATION RATE: 17 BRPM | WEIGHT: 143 LBS | DIASTOLIC BLOOD PRESSURE: 72 MMHG | HEART RATE: 75 BPM | HEIGHT: 61 IN | SYSTOLIC BLOOD PRESSURE: 112 MMHG | OXYGEN SATURATION: 100 %

## 2021-02-08 DIAGNOSIS — M62.838 MUSCLE SPASM: ICD-10-CM

## 2021-02-08 DIAGNOSIS — M77.11 LATERAL EPICONDYLITIS OF RIGHT ELBOW: ICD-10-CM

## 2021-02-08 DIAGNOSIS — M19.011 ARTHRITIS OF RIGHT ACROMIOCLAVICULAR JOINT: ICD-10-CM

## 2021-02-08 DIAGNOSIS — M79.10 TRIGGER POINT: Primary | ICD-10-CM

## 2021-02-08 DIAGNOSIS — M67.813 TENDINOSIS OF RIGHT SHOULDER: ICD-10-CM

## 2021-02-08 PROCEDURE — 99213 OFFICE O/P EST LOW 20 MIN: CPT | Performed by: PHYSICAL MEDICINE & REHABILITATION

## 2021-02-08 PROCEDURE — 20552 NJX 1/MLT TRIGGER POINT 1/2: CPT | Performed by: PHYSICAL MEDICINE & REHABILITATION

## 2021-02-08 RX ORDER — LIDOCAINE HYDROCHLORIDE 20 MG/ML
1.5 INJECTION, SOLUTION INFILTRATION; PERINEURAL ONCE
Status: COMPLETED | OUTPATIENT
Start: 2021-02-08 | End: 2021-02-08

## 2021-02-08 RX ORDER — BETAMETHASONE SODIUM PHOSPHATE AND BETAMETHASONE ACETATE 3; 3 MG/ML; MG/ML
9 INJECTION, SUSPENSION INTRA-ARTICULAR; INTRALESIONAL; INTRAMUSCULAR; SOFT TISSUE ONCE
Status: CANCELLED | OUTPATIENT
Start: 2021-02-08 | End: 2021-02-08

## 2021-02-08 RX ORDER — DICLOFENAC SODIUM 10 MG/G
GEL TOPICAL
Qty: 500 G | Refills: 1 | Status: SHIPPED | OUTPATIENT
Start: 2021-02-08

## 2021-02-08 RX ORDER — BUPIVACAINE HYDROCHLORIDE 2.5 MG/ML
1.5 INJECTION, SOLUTION INFILTRATION; PERINEURAL ONCE
Status: COMPLETED | OUTPATIENT
Start: 2021-02-08 | End: 2021-02-08

## 2021-02-08 RX ADMIN — BUPIVACAINE HYDROCHLORIDE 3.75 MG: 2.5 INJECTION, SOLUTION INFILTRATION; PERINEURAL at 15:47

## 2021-02-08 RX ADMIN — LIDOCAINE HYDROCHLORIDE 30 MG: 20 INJECTION, SOLUTION INFILTRATION; PERINEURAL at 16:04

## 2021-02-08 NOTE — PROGRESS NOTES
Chuck Rodrigez presents today for   Chief Complaint   Patient presents with    Neck Pain    Elbow Pain     right       Is someone accompanying this pt? no    Is the patient using any DME equipment during OV? no    Depression Screening:  3 most recent PHQ Screens 12/10/2020   PHQ Not Done Patient Decline   Little interest or pleasure in doing things -   Feeling down, depressed, irritable, or hopeless -   Total Score PHQ 2 -       Learning Assessment:  Learning Assessment 6/13/2019   PRIMARY LEARNER Patient   BARRIERS PRIMARY LEARNER NONE   CO-LEARNER CAREGIVER No   PRIMARY LANGUAGE ENGLISH   LEARNER PREFERENCE PRIMARY READING   ANSWERED BY self   RELATIONSHIP SELF       Abuse Screening:  Abuse Screening Questionnaire 6/18/2020   Do you ever feel afraid of your partner? N   Are you in a relationship with someone who physically or mentally threatens you? N   Is it safe for you to go home? Y       Fall Risk  Fall Risk Assessment, last 12 mths 6/13/2019   Able to walk? Yes   Fall in past 12 months? No         Coordination of Care:  1. Have you been to the ER, urgent care clinic since your last visit? no  Hospitalized since your last visit? no    2. Have you seen or consulted any other health care providers outside of the [address] [place] [address] since your last visit? no Include any pap smears or colon screening.  no

## 2021-02-08 NOTE — LETTER
2/9/2021 Patient: Evangelina Elder YOB: 1985 Date of Visit: 2/8/2021 Jw Lara MD 
[address] [place] [address] [facility] [address] [address] Fax: [phone] Dear Jw Lara MD, Thank you for referring Ms. Howie Hong to [facility] for evaluation. My notes for this consultation are attached. If you have questions, please do not hesitate to call me. I look forward to following your patient along with you. Sincerely, Cristopher Gonzalez MD

## 2021-02-08 NOTE — PATIENT INSTRUCTIONS
Tennis Elbow: Exercises Introduction Here are some examples of exercises for you to try. The exercises may be suggested for a condition or for rehabilitation. Start each exercise slowly. Ease off the exercises if you start to have pain. You will be told when to start these exercises and which ones will work best for you. How to do the exercises Wrist flexor stretch 1. Extend your arm in front of you with your palm up. 2. Bend your wrist, pointing your hand toward the floor. 3. With your other hand, gently bend your wrist farther until you feel a mild to moderate stretch in your forearm. 4. Hold for at least 15 to 30 seconds. Repeat 2 to 4 times. Wrist extensor stretch 1. Repeat steps 1 to 4 of the stretch above but begin with your extended hand palm down. Ball or sock squeeze 1. Hold a tennis ball (or a rolled-up sock) in your hand. 2. Make a fist around the ball (or sock) and squeeze. 3. Hold for about 6 seconds, and then relax for up to 10 seconds. 4. Repeat 8 to 12 times. 5. Switch the ball (or sock) to your other hand and do 8 to 12 times. Wrist deviation 1. Sit so that your arm is supported but your hand hangs off the edge of a flat surface, such as a table. 2. Hold your hand out like you are shaking hands with someone. 3. Move your hand up and down. 4. Repeat this motion 8 to 12 times. 5. Switch arms. 6. Try to do this exercise twice with each hand. Wrist curls 1. Place your forearm on a table with your hand hanging over the edge of the table, palm up. 2. Place a 1- to 2-pound weight in your hand. This may be a dumbbell, a can of food, or a filled water bottle. 3. Slowly raise and lower the weight while keeping your forearm on the table and your palm facing up. 4. Repeat this motion 8 to 12 times. 5. Switch arms, and do steps 1 through 4. 
6. Repeat with your hand facing down toward the floor. Switch arms. Biceps curls 1. Sit leaning forward with your legs slightly spread and your left hand on your left thigh. 2. Place your right elbow on your right thigh, and hold the weight with your forearm horizontal. 
3. Slowly curl the weight up and toward your chest. 
4. Repeat this motion 8 to 12 times. 5. Switch arms, and do steps 1 through 4. Follow-up care is a key part of your treatment and safety. Be sure to make and go to all appointments, and call your doctor if you are having problems. It's also a good idea to know your test results and keep a list of the medicines you take. Where can you learn more? Go to http://www.gray.com/ Enter Y734 in the search box to learn more about \"Tennis Elbow: Exercises. \" Current as of: March 2, 2020               Content Version: 12.6 © 5933-4072 [employer]. Care instructions adapted under license by [employer] (which disclaims liability or warranty for this information). If you have questions about a medical condition or this instruction, always ask your healthcare professional. [place] [address] any warranty or liability for your use of this information. Shoulder Arthritis: Exercises Introduction Here are some examples of exercises for you to try. The exercises may be suggested for a condition or for rehabilitation. Start each exercise slowly. Ease off the exercises if you start to have pain. You will be told when to start these exercises and which ones will work best for you. How to do the exercises Shoulder flexion (lying down) To make a wand for this exercise, use a piece of PVC pipe or a broom handle with the broom removed. Make the wand about a foot wider than your shoulders. 5. Lie on your back, holding a wand with both hands. Your palms should face down as you hold the wand. 6. Keeping your elbows straight, slowly raise your arms over your head. Raise them until you feel a stretch in your shoulders, upper back, and chest. 
7. Hold for 15 to 30 seconds. 8. Repeat 2 to 4 times. Shoulder rotation (lying down) To make a wand for this exercise, use a piece of PVC pipe or a broom handle with the broom removed. Make the wand about a foot wider than your shoulders. 2. Lie on your back. Hold a wand with both hands with your elbows bent and palms up. 3. Keep your elbows close to your body, and move the wand across your body toward the sore arm. 4. Hold for 8 to 12 seconds. 5. Repeat 2 to 4 times. Shoulder internal rotation with towel 6. Hold a towel above and behind your head with the arm that is not sore. 7. With your sore arm, reach behind your back and grasp the towel. 8. With the arm above your head, pull the towel upward. Do this until you feel a stretch on the front and outside of your sore shoulder. 9. Hold 15 to 30 seconds. 10. Repeat 2 to 4 times. Shoulder blade squeeze 7. Stand with your arms at your sides, and squeeze your shoulder blades together. Do not raise your shoulders up as you squeeze. 8. Hold 6 seconds. 9. Repeat 8 to 12 times. Resisted rows For this exercise, you will need elastic exercise material, such as surgical tubing or Thera-Band. 7. Put the band around a solid object at about waist level. (A bedpost will work well.) Each hand should hold an end of the band. 8. With your elbows at your sides and bent to 90 degrees, pull the band back. Your shoulder blades should move toward each other. Return to the starting position. 9. Repeat 8 to 12 times. External rotator strengthening exercise 6. Start by tying a piece of elastic exercise material to a doorknob. You can use surgical tubing or Thera-Band. (You may also hold one end of the band in each hand.) 7. Stand or sit with your shoulder relaxed and your elbow bent 90 degrees. Your upper arm should rest comfortably against your side. Squeeze a rolled towel between your elbow and your body for comfort. This will help keep your arm at your side. 8. Hold one end of the elastic band with the hand of the painful arm. 9. Start with your forearm across your belly. Slowly rotate the forearm out away from your body. Keep your elbow and upper arm tucked against the towel roll or the side of your body until you begin to feel tightness in your shoulder. Slowly move your arm back to where you started. 10. Repeat 8 to 12 times. Internal rotator strengthening exercise 1. Start by tying a piece of elastic exercise material to a doorknob. You can use surgical tubing or Thera-Band. 2. Stand or sit with your shoulder relaxed and your elbow bent 90 degrees. Your upper arm should rest comfortably against your side. Squeeze a rolled towel between your elbow and your body for comfort. This will help keep your arm at your side. 3. Hold one end of the elastic band in the hand of the painful arm. 4. Slowly rotate your forearm toward your body until it touches your belly. Slowly move it back to where you started. 5. Keep your elbow and upper arm firmly tucked against the towel roll or at your side. 6. Repeat 8 to 12 times. Pendulum swing If you have pain in your back, do not do this exercise. 1. Hold on to a table or the back of a chair with your good arm. Then bend forward a little and let your sore arm hang straight down. This exercise does not use the arm muscles. Rather, use your legs and your hips to create movement that makes your arm swing freely. 2. Use the movement from your hips and legs to guide the slightly swinging arm back and forth like a pendulum (or elephant trunk). Then guide it in circles that start small (about the size of a dinner plate). Make the circles a bit larger each day, as your pain allows. 3. Do this exercise for 5 minutes, 5 to 7 times each day. 4. As you have less pain, try bending over a little farther to do this exercise. This will increase the amount of movement at your shoulder. Follow-up care is a key part of your treatment and safety. Be sure to make and go to all appointments, and call your doctor if you are having problems. It's also a good idea to know your test results and keep a list of the medicines you take. Where can you learn more? Go to http://www.gray.com/ Enter H562 in the search box to learn more about \"Shoulder Arthritis: Exercises. \" Current as of: March 2, 2020               Content Version: 12.6 © 3784-0494 [employer]. Care instructions adapted under license by [employer] (which disclaims liability or warranty for this information). If you have questions about a medical condition or this instruction, always ask your healthcare professional. [address] any warranty or liability for your use of this information. Healthy Upper Back: Exercises Introduction Here are some examples of exercises for your upper back. Start each exercise slowly. Ease off the exercise if you start to have pain. Your doctor or physical therapist will tell you when you can start these exercises and which ones will work best for you. How to do the exercises Lower neck and upper back stretch 9. Stretch your arms out in front of your body. Clasp one hand on top of your other hand. 10. Gently reach out so that you feel your shoulder blades stretching away from each other. 11. Gently bend your head forward. 12. Hold for 15 to 30 seconds. 13. Repeat 2 to 4 times. Midback stretch If you have knee pain, do not do this exercise. 6. Kneel on the floor, and sit back on your ankles. 7. Lean forward, place your hands on the floor, and stretch your arms out in front of you. Rest your head between your arms. 8. Gently push your chest toward the floor, reaching as far in front of you as possible. 9. Hold for 15 to 30 seconds. 10. Repeat 2 to 4 times. Shoulder rolls 11. Sit comfortably with your feet shoulder-width apart. You can also do this exercise while standing. 12. Roll your shoulders up, then back, and then down in a smooth, circular motion. 13. Repeat 2 to 4 times. Wall push-up 10. Stand against a wall with your feet about 12 to 24 inches back from the wall. If you feel any pain when you do this exercise, stand closer to the wall. 11. Place your hands on the wall slightly wider apart than your shoulders, and lean forward. 12. Gently lean your body toward the wall. Then push back to your starting position. Keep the motion smooth and controlled. 13. Repeat 8 to 12 times. Resisted shoulder blade squeeze For this exercise, you will need elastic exercise material, such as surgical tubing or Thera-Band. 10. Sit or stand, holding the band in both hands in front of you. Keep your elbows close to your sides, bent at a 90-degree angle. Your palms should face up. 11. Squeeze your shoulder blades together, and move your arms to the outside, stretching the band. Be sure to keep your elbows at your sides while you do this. 12. Relax. 13. Repeat 8 to 12 times. Resisted rows For this exercise, you will need elastic exercise material, such as surgical tubing or Thera-Band. 11. Put the band around a solid object, such as a bedpost, at about waist level. Hold one end of the band in each hand. 12. With your elbows at your sides and bent to 90 degrees, pull the band back to move your shoulder blades toward each other. Return to the starting position. 13. Repeat 8 to 12 times. Follow-up care is a key part of your treatment and safety. Be sure to make and go to all appointments, and call your doctor if you are having problems. It's also a good idea to know your test results and keep a list of the medicines you take. Where can you learn more? Go to http://www.gray.com/ Enter E622 in the search box to learn more about \"Healthy Upper Back: Exercises. \" Current as of: March 2, 2020               Content Version: 12.6 © 2006-2020 [employer]. Care instructions adapted under license by [employer] (which disclaims liability or warranty for this information). If you have questions about a medical condition or this instruction, always ask your healthcare professional. [address] any warranty or liability for your use of this information.

## 2021-02-08 NOTE — PROGRESS NOTES
[facility]  [address] [place], [facility] [address]  Phone: [phone]  Fax: [phone]    Pt's YOB: 1985    ASSESSMENT   Diagnoses and all orders for this visit:    1. Trigger point  -     bupivacaine (MARCAINE) 0.25 % (2.5 mg/mL) injection 3.75 mg  -     lidocaine (XYLOCAINE) 20 mg/mL (2 %) injection 30 mg  -     INJECT TRIGGER POINT, 1 OR 2    2. Arthritis of right acromioclavicular joint  -     diclofenac (Voltaren) 1 % gel; Apply 4 grams to affected joint up to 4 times per day, maximum 16 grams per joint per day. Dispense 5 100 gram tubes    3. Tendinosis of right shoulder    4. Muscle spasm    5. Lateral epicondylitis of right elbow         IMPRESSION AND PLAN:  Sallie Cerda is a 38 y.o. right hand dominant female with history of cervical pain. She complains of pain in the right shoulder and elbow that worsens with overhead motion. She has been using IcyHot and BioFreeze without relief and notes minimal improvement with physical therapy. 1) Pt was given information on tennis elbow, upper back, and shoulder arthritis exercises. 2) She was prescribed Voltaren 1% gel. 3) I recommended the patient use an OTC cho-pat elbow strap. 4) Pt received trigger point injections in the office today. 5) Ms. Irina Fan has a reminder for a \"due or due soon\" health maintenance. I have asked that she contact her primary care provider, Pedro Luis Porter MD, for follow-up on this health maintenance. 6)  demonstrated consistency with prescribing. 7) Consider referral to ortho / shoulder injection pending response to current treatment. Follow-up and Dispositions    · Return in about 6 weeks (around 3/22/2021) for Medication follow up. HISTORY OF PRESENT ILLNESS:  Sallie Cerda is a 38 y.o. right hand dominant female with history of cervical pain and presents to the office today for follow up.  She complains of pain in the right shoulder and elbow. Her shoulder pain generally worsens with overhead motion and when reaching behind. Pt admits to aching pain in the left elbow with all movement. She admits to weakness in the right arm and notes that she generally avoids lifting. Pt reports minimal improvement with physical therapy at [place] [address]. Of note, she wears a 34 C sized bra. She has been using IcyHot and BioFreeze without relief. Pt at this time desires to proceed with medication evaluation and trigger point injections.     Pain Scale: 3/10    PCP: Lonnie Marinelli MD     Past Medical History:   Diagnosis Date    Anemia     Hemorrhoids     Joint pain     Muscle pain         Social History     Socioeconomic History    Marital status: SINGLE     Spouse name: Not on file    Number of children: Not on file    Years of education: Not on file    Highest education level: Not on file   Occupational History    Not on file   Social Needs    Financial resource strain: Not on file    Food insecurity     Worry: Not on file     Inability: Not on file    Transportation needs     Medical: Not on file     Non-medical: Not on file   Tobacco Use    Smoking status: Never Smoker    Smokeless tobacco: Never Used   Substance and Sexual Activity    Alcohol use: No    Drug use: No    Sexual activity: Yes     Partners: Male   Lifestyle    Physical activity     Days per week: Not on file     Minutes per session: Not on file    Stress: Not on file   Relationships    Social connections     Talks on phone: Not on file     Gets together: Not on file     Attends [employer] service: Not on file     Active member of club or organization: Not on file     Attends meetings of clubs or organizations: Not on file     Relationship status: Not on file    Intimate partner violence     Fear of current or ex partner: Not on file     Emotionally abused: Not on file     Physically abused: Not on file     Forced sexual activity: Not on file   Other Topics Concern    Not on file   Social History Narrative    Not on file       Current Outpatient Medications   Medication Sig Dispense Refill    diclofenac (Voltaren) 1 % gel Apply 4 grams to affected joint up to 4 times per day, maximum 16 grams per joint per day. Dispense 5 100 gram tubes 500 g 1    naproxen (NAPROSYN) 500 mg tablet Take 1 Tab by mouth two (2) times daily (with meals). 60 Tab 1    albuterol (PROVENTIL HFA, VENTOLIN HFA, PROAIR HFA) 90 mcg/actuation inhaler PLEASE SEE ATTACHED FOR DETAILED DIRECTIONS      Symbicort 80-4.5 mcg/actuation HFAA INHALE 1 PUFF BY MOUTH TWICE A DAY RINSE MOUTH AFTER USE      methocarbamoL (ROBAXIN) 750 mg tablet Take 750 mg by mouth every four (4) hours as needed.  montelukast (SINGULAIR) 10 mg tablet TAKE 1 TABLET BY MOUTH EVERYDAY AT BEDTIME      ergocalciferol (ERGOCALCIFEROL) 50,000 unit capsule Take 1 Cap by mouth every seven (7) days. 12 Cap 0    Cholecalciferol, Vitamin D3, (VITAMIN D3) 2,000 unit cap capsule Take  by mouth two (2) times a day.  methocarbamoL (ROBAXIN) 500 mg tablet Take 1 tab by mouth BID-TID as needed for muscle spasm. 60 Tab 1    Combivent Respimat  mcg/actuation inhaler INHALE 1 PUFF BY MOUTH TWICE A DAY      lidocaine (LIDODERM) 5 % 1 Patch by TransDERmal route every twenty-four (24) hours.  meloxicam (MOBIC) 15 mg tablet TAKE 1 TAB BY MOUTH DAILY AS NEEDED FOR PAIN. TAKE WITH FOOD  1       No Known Allergies      REVIEW OF SYSTEMS    Constitutional: Negative for fever, chills, or weight change. Respiratory: Negative for cough or shortness of breath. Cardiovascular: Negative for chest pain or palpitations. Gastrointestinal: Negative for acid reflux, change in bowel habits, or constipation. Genitourinary: Negative for dysuria and flank pain. Musculoskeletal: Positive for cervical, right shoulder, and right elbow pain. Neurological: Negative for headaches, dizziness, or numbness.   Endo/Heme/Allergies: Negative for increased bruising. Psychiatric/Behavioral: Positive for difficulty with sleep at times. As per HPI    PHYSICAL EXAMINATION  Visit Vitals  /72 (BP 1 Location: Left upper arm, BP Patient Position: Sitting, BP Cuff Size: Adult)   Pulse 75   Temp 97.9 °F (36.6 °C) (Skin)   Resp 17   Ht 5' 1\" (1.549 m)   Wt 143 lb (64.9 kg)   LMP 10/06/2014   SpO2 100% Comment: RA   BMI 27.02 kg/m²        Constitutional: Awake, alert, and in no acute distress. Neurological: 1+ symmetrical DTRs in the upper extremities. 1+ symmetrical DTRs in the lower extremities. Sensation to light touch is intact. Negative Suarez's sign bilaterally. Skin: warm, dry, and intact. Musculoskeletal: Tight across the upper trapezius bilaterally, R>L, with scattered trigger points. Decreased range of motion with side to side cervical flexion. Good range of motion in the left shoulder. Pain and decreased range of motion with abduction of the right shoulder. Pain with internal rotation of the right shoulder. Positive impingement sign on the right. Tenderness to palpation over the right AC joint. Pain with flexion of the right elbow. Biceps  Triceps Deltoids Wrist Ext Wrist Flex Hand Intrin   Right +4/5 +4/5 +4/5 +4/5 +4/5 +4/5   Left +4/5 +4/5 +4/5 +4/5 +4/5 +4/5     PROCEDURES:    Patient's right upper trapezius was injected with 4 trigger point injections containing  1.5 cc Marcaine and 1.5 cc Lidocaine. Pre-injection pain level was 3/10 and post-injection pain level was 2/10    Chart reviewed for the following:   Marci CANCHOLA MD, have reviewed the History, Physical and updated the Allergic reactions for [facility].      TIME OUT performed immediately prior to start of procedure:  Marci CANCHOLA MD, have performed the following reviews on [facility] prior to the start of the procedure:            * Patient was identified by name and date of birth   * Agreement on procedure being performed was verified  * Risks and Benefits explained to the patient  * Procedure site verified and marked as necessary  * Patient was positioned for comfort  * Consent was obtained     Time: 2:15 PM     Date of procedure: 2/8/2021    Procedure performed by: Emily Lopez MD    Ms. Manrique tolerated the procedure well with no complications. IMAGING:    Right shoulder MRI from 1/23/2021 was personally reviewed with the patient and demonstrated:  Results from Orders Only encounter on 01/11/21   MRI SHOULDER RT WO CONT    Narrative Procedure: MRI SHOULDER RT WO CONT    Indications: persistent right shoulder pain and limited range of motion despite  physical therapy and NSAID's; assess for labral or rotator cuff tear. Chronic  pain, right arm weakness, myofascial pain and muscle spasm. Technique: Multi-sequence, multiplanar MR imaging performed through the right  shoulder:    Comparisons: None    Findings:    Joints/soft tissues: Mild hypertrophic spurring of the acromioclavicular joint  with trace inflammation. Type II acromion. Slight lateral downsloping. No  substantive bursitis. No shoulder joint effusion or loose body. Labrum: Intact    Articular cartilage: Intact    Rotator cuff-   Supraspinatus: Mild distal tendinosis, no tear. Infraspinatus: Unremarkable  Subscapularis: Unremarkable  Teres Minor: Unremarkable    Biceps: Intact    Other Structures: Unremarkable          Impression :    1. Essentially unremarkable right shoulder MRI  -Minor supraspinatus tendinosis  -Mild AC joint arthritis  -Slight lateral downsloping of the acromion could predispose to external  impingement           Right elbow 3V x-rays from 1/23/2021 were personally reviewed with the patient and demonstrated:  Results from Orders Only encounter on 01/04/21   XR ELBOW RT MIN 3 V    Narrative  Right elbow AP, lateral and oblique views    HISTORY: Right elbow pain    COMPARISON: None.     FINDINGS: The bony structures about the elbow are intact. There is no evidence  of acute fracture or dislocation identified. No joint effusion identified. Impression Negative for acute injury. Thank you for your referral.        Cervical spine MRI from 10/1/2020 was personally reviewed with the patient and demonstrated:  FINDINGS:     Postoperative changes: None. Alignment: Straightening of upper cervical lordotic curvature. .    Vertebral body heights: Normal.    Marrow signal: Normal.    Cord: Normal.    Cerebellar tonsils: No Chiari 1 malformation. Soft tissues: Normal.     Correlation of axial and sagittal data through the disc levels:    -C2-3: No significant disc pathology. Facet joints appear normal..No spinal canal or foraminal stenosis. -C3-4: No significant disc pathology. Facet joints appear normal..No spinal canal or foraminal stenosis. -C4-5: No significant disc pathology. Facet joints appear normal..No spinal canal or foraminal stenosis. -C5-6: Disc height is mildly diminished with slight disc bulging. Facet joints appear normal.. No spinal canal or foraminal stenosis. -C6-7: No significant disc pathology. Facet joints appear normal..No spinal canal or foraminal stenosis. -C7-T1: No significant disc pathology. Facet joints appear normal.. The small ossified body projecting in the C7-T1 superior left neural foramen on the prior radiographs is difficult to visualize on the MR images. It is possibly related to the facet on series 3 image 4. No significant spinal canal or foraminal stenosis. IMPRESSION  No large disc herniation, cord compression or cord signal abnormality. No foraminal stenosis or nerve root compression seen.  Previously reported ossified density in the C7-T1 left neural foramen difficult to visualize as discussed above but of doubtful clinical significance given the patient's reported right side arm pain and weakness.     Cervical spine x-rays from 9/18/2020 were personally reviewed with the patient and demonstrated:  FINDINGS:    VERTEBRAE AND ALIGNMENT: No fracture or subluxation is seen. FACET JOINTS AND FORAMINA: Calcific opacity is seen extending from the left C7-T1 facet joint with moderate narrowing of the left foramen as seen on the oblique view    PARASPINOUS SOFT TISSUES: Unremarkable. ADDITIONAL: Radiolucency is seen surrounding a left mandibular first molar, suggesting periapical abscess. _______________    IMPRESSION    No fracture or subluxation. Unusual calcific opacity narrowing the left C7-T1 foramen superiorly, suspect elongated facet osteophyte or less likely partially calcified synovial cyst. Consider follow-up dedicated MR cervical spine especially if left C8 radiculopathy symptoms. Findings suspicious for left mandibular first molar periapical abscess.      Right shoulder x-rays from 9/16/2020 were personally reviewed with the patient and demonstrated:  FINDINGS:    BONES: Intact and normally aligned. SOFT TISSUES: Unremarkable.    _______________    IMPRESSION    No significant abnormality.     Right upper extremity EMG from 11/25/2020 were personally reviewed with the patient and demonstrated:  NCV & EMG Findings:  All nerve conduction studies (as indicated in the following tables) were within normal limits.       All examined muscles (as indicated in the following table) showed no evidence of electrical instability.       INTERPRETATION  This was a normal nerve conduction and EMG study showing there to be no signs of neuropathy, myopathy, or radiculopathy in the nerves and muscles tested.      CLINICAL INTERPRETATION  Her electrodiagnostic findings do not appear to explain her right arm symptoms.       Written by Paulino Valdez, as dictated by Clair Dyson MD.  I, Dr. Clair Dyson confirm that all documentation is accurate.

## 2021-05-06 NOTE — PROGRESS NOTES
[facility]  [address] [place], [facility] [address]  Phone: [phone]  Fax: [phone]    Pt's YOB: 1985    ASSESSMENT   Diagnoses and all orders for this visit:    1. Right elbow pain  -     REFERRAL TO ORTHOPEDICS    2. Muscle spasm  -     REFERRAL TO ORTHOPEDICS    3. Trigger point  -     betamethasone (CELESTONE) injection 6 mg  -     bupivacaine HCl (MARCAINE) 0.25 % (2.5 mg/mL) injection 2.5 mg  -     lidocaine (XYLOCAINE) 20 mg/mL (2 %) injection 20 mg  -     INJECT TRIGGER POINT, 1 OR 2    4. Myofascial pain    5. Osteoarthritis of right AC (acromioclavicular) joint    6. Chronic right shoulder pain         IMPRESSION AND PLAN:  Souleymane Nicole is a 43 y.o. right hand dominant female with history of cervical pain. She complains of pain in the right upper back but reports relief with trigger point injections at her last office visit. Pt reports continued right elbow pain and takes Naprosyn 500 mg 1-2 tabs daily with benefit. 1) Pt was given information on cervical arthritis exercises. 2) She received trigger point injections. 3) Pt was referred to Dr. Bre Martinez for further evaluation of her right elbow; pt did not benefit from conservative management for lateral epicondylitis. 4) Ms. Tony Rose has a reminder for a \"due or due soon\" health maintenance. I have asked that she contact her primary care provider, Karena Lucero MD, for follow-up on this health maintenance. 5)  demonstrated consistency with prescribing. Follow-up and Dispositions    · Return in about 6 weeks (around 6/21/2021) for Medication follow up. HISTORY OF PRESENT ILLNESS:  Souleymane Nicole is a 43 y.o. right hand dominant female with history of cervical pain and presents to the office today for follow up. She complains of pain in the right upper back but reports relief with trigger point injections at her last office visit.  Pt also notes pain in the right elbow without improvement. She denies any numbness in the right hand/arm. Pt has been using an OTC cho-pat elbow strap without relief. She takes Naprosyn 500 mg 1-2 tabs daily with benefit. Pt also reports relief when using Voltaren gel on the right elbow. She notes minimal improvement with when attending physical therapy for 4 months at [place] [address]. Pt at this time desires to proceed with trigger point injections and a referral to orthopedics.     Pain Scale: 5/10    PCP: Anna Sanders MD     Past Medical History:   Diagnosis Date    Anemia     Hemorrhoids     Joint pain     Muscle pain         Social History     Socioeconomic History    Marital status: SINGLE     Spouse name: Not on file    Number of children: Not on file    Years of education: Not on file    Highest education level: Not on file   Occupational History    Not on file   Social Needs    Financial resource strain: Not on file    Food insecurity     Worry: Not on file     Inability: Not on file    Transportation needs     Medical: Not on file     Non-medical: Not on file   Tobacco Use    Smoking status: Never Smoker    Smokeless tobacco: Never Used   Substance and Sexual Activity    Alcohol use: No    Drug use: No    Sexual activity: Yes     Partners: Male   Lifestyle    Physical activity     Days per week: Not on file     Minutes per session: Not on file    Stress: Not on file   Relationships    Social connections     Talks on phone: Not on file     Gets together: Not on file     Attends Sikh service: Not on file     Active member of club or organization: Not on file     Attends meetings of clubs or organizations: Not on file     Relationship status: Not on file    Intimate partner violence     Fear of current or ex partner: Not on file     Emotionally abused: Not on file     Physically abused: Not on file     Forced sexual activity: Not on file   Other Topics Concern    Not on file   Social History Narrative    Not on file       Current Outpatient Medications   Medication Sig Dispense Refill    hydrOXYzine pamoate (VISTARIL) 25 mg capsule Take 1 Cap by mouth daily.  predniSONE (DELTASONE) 10 mg tablet Take 10 mg by mouth daily.  diclofenac (Voltaren) 1 % gel Apply 4 grams to affected joint up to 4 times per day, maximum 16 grams per joint per day. Dispense 5 100 gram tubes 500 g 1    naproxen (NAPROSYN) 500 mg tablet Take 1 Tab by mouth two (2) times daily (with meals). 60 Tab 1    albuterol (PROVENTIL HFA, VENTOLIN HFA, PROAIR HFA) 90 mcg/actuation inhaler PLEASE SEE ATTACHED FOR DETAILED DIRECTIONS      Symbicort 80-4.5 mcg/actuation HFAA INHALE 1 PUFF BY MOUTH TWICE A DAY RINSE MOUTH AFTER USE      methocarbamoL (ROBAXIN) 750 mg tablet Take 750 mg by mouth every four (4) hours as needed.  montelukast (SINGULAIR) 10 mg tablet TAKE 1 TABLET BY MOUTH EVERYDAY AT BEDTIME      ergocalciferol (ERGOCALCIFEROL) 50,000 unit capsule Take 1 Cap by mouth every seven (7) days. 12 Cap 0    Cholecalciferol, Vitamin D3, (VITAMIN D3) 2,000 unit cap capsule Take 2,000 Units by mouth two (2) times a day.  PARoxetine (PAXIL) 10 mg tablet Take 10 mg by mouth daily.  sertraline (ZOLOFT) 100 mg tablet Take 1 Tab by mouth daily. Allergies   Allergen Reactions    Sertraline Unknown (comments)     **PT DENIES ALLERGY**         REVIEW OF SYSTEMS    Constitutional: Negative for fever, chills, or weight change. Respiratory: Negative for cough or shortness of breath. Cardiovascular: Negative for chest pain or palpitations. Gastrointestinal: Negative for acid reflux, change in bowel habits, or constipation. Genitourinary: Negative for dysuria and flank pain. Musculoskeletal: Positive for cervical and right elbow pain. Neurological: Negative for headaches, dizziness, or numbness. Endo/Heme/Allergies: Negative for increased bruising.    Psychiatric/Behavioral: Positive for difficulty with sleep at times. As per HPI    PHYSICAL EXAMINATION  Visit Vitals  /72   Pulse 70   Temp 97.7 °F (36.5 °C) (Tympanic)   Resp 12   Ht 5' 1\" (1.549 m)   Wt 137 lb 12.8 oz (62.5 kg)   LMP 10/06/2014   SpO2 98%   BMI 26.04 kg/m²       Constitutional: Awake, alert, and in no acute distress. Neurological: 1+ symmetrical DTRs in the upper extremities. 1+ symmetrical DTRs in the lower extremities. Sensation to light touch is intact. Negative Suarez's sign bilaterally. Skin: warm, dry, and intact. Musculoskeletal: Good range of motion with side to side cervical flexion but pain with right cervical flexion. Pain with cervical extension. Very tight across the upper trapezius on the right. Pain with supination and pronation of the right elbow; pain with palpation lateral aspect of elbow -- no swelling, warmth or erythema. Biceps  Triceps Deltoids Wrist Ext Wrist Flex Hand Intrin   Right +4/5 +4/5 +4/5 +4/5 +4/5 +4/5   Left +4/5 +4/5 +4/5 +4/5 +4/5 +4/5     PROCEDURES:    I administered 4 trigger point injections to right upper trapezius using 1 cc Marcaine, 1 cc Lidocaine, and 1 cc Celestone. Pre-injection pain level was 5/10 and post-injection pain level was 2/10    Chart reviewed for the following:   Umer CANCHOLA MD, have reviewed the History, Physical and updated the Allergic reactions for [facility]. TIME OUT performed immediately prior to start of procedure:  Umer CANCHOLA MD, have performed the following reviews on [facility] prior to the start of the procedure:            * Patient was identified by name and date of birth   * Agreement on procedure being performed was verified  * Risks and Benefits explained to the patient  * Procedure site verified and marked as necessary  * Patient was positioned for comfort  * Consent was obtained     Time: 9:35 AM     Date of procedure: 5/10/2021    Procedure performed by: Smith Devine MD    MsMamie  Burton graeme the procedure well with no complications. IMAGING:    Right shoulder MRI from 1/23/2021 was personally reviewed with the patient and demonstrated:       Results from Orders Only encounter on 01/11/21   MRI SHOULDER RT WO CONT     Narrative Procedure: MRI SHOULDER RT WO CONT     Indications: persistent right shoulder pain and limited range of motion despite  physical therapy and NSAID's; assess for labral or rotator cuff tear. Chronic  pain, right arm weakness, myofascial pain and muscle spasm.     Technique: Multi-sequence, multiplanar MR imaging performed through the right  shoulder:     Comparisons: None     Findings:     Joints/soft tissues: Mild hypertrophic spurring of the acromioclavicular joint  with trace inflammation. Type II acromion. Slight lateral downsloping. No  substantive bursitis. No shoulder joint effusion or loose body.     Labrum: Intact     Articular cartilage: Intact     Rotator cuff-   Supraspinatus: Mild distal tendinosis, no tear. Infraspinatus: Unremarkable  Subscapularis: Unremarkable  Teres Minor: Unremarkable     Biceps: Intact     Other Structures: Unremarkable           Impression :     1. Essentially unremarkable right shoulder MRI  -Minor supraspinatus tendinosis  -Mild AC joint arthritis  -Slight lateral downsloping of the acromion could predispose to external  impingement               Right elbow 3V x-rays from 1/23/2021 were personally reviewed with the patient and demonstrated:       Results from Orders Only encounter on 01/04/21   XR ELBOW RT MIN 3 V     Narrative  Right elbow AP, lateral and oblique views     HISTORY: Right elbow pain     COMPARISON: None.     FINDINGS: The bony structures about the elbow are intact. There is no evidence  of acute fracture or dislocation identified.  No joint effusion identified.        Impression Negative for acute injury.     Thank you for your referral.          Cervical spine MRI from 10/1/2020 was personally reviewed with the patient and demonstrated:  FINDINGS:     Postoperative changes: None. Alignment: Straightening of upper cervical lordotic curvature. .    Vertebral body heights: Normal.    Marrow signal: Normal.    Cord: Normal.    Cerebellar tonsils: No Chiari 1 malformation. Soft tissues: Normal.     Correlation of axial and sagittal data through the disc levels:    -C2-3: No significant disc pathology. Facet joints appear normal..No spinal canal or foraminal stenosis. -C3-4: No significant disc pathology. Facet joints appear normal..No spinal canal or foraminal stenosis. -C4-5: No significant disc pathology. Facet joints appear normal..No spinal canal or foraminal stenosis. -C5-6: Disc height is mildly diminished with slight disc bulging. Facet joints appear normal.. No spinal canal or foraminal stenosis. -C6-7: No significant disc pathology. Facet joints appear normal..No spinal canal or foraminal stenosis. -C7-T1: No significant disc pathology. Facet joints appear normal.. The small ossified body projecting in the C7-T1 superior left neural foramen on the prior radiographs is difficult to visualize on the MR images. It is possibly related to the facet on series 3 image 4. No significant spinal canal or foraminal stenosis. IMPRESSION  No large disc herniation, cord compression or cord signal abnormality. No foraminal stenosis or nerve root compression seen. Previously reported ossified density in the C7-T1 left neural foramen difficult to visualize as discussed above but of doubtful clinical significance given the patient's reported right side arm pain and weakness.     Cervical spine x-rays from 9/18/2020 were personally reviewed with the patient and demonstrated:  FINDINGS:    VERTEBRAE AND ALIGNMENT: No fracture or subluxation is seen.     FACET JOINTS AND FORAMINA: Calcific opacity is seen extending from the left C7-T1 facet joint with moderate narrowing of the left foramen as seen on the oblique view    PARASPINOUS SOFT TISSUES: Unremarkable. ADDITIONAL: Radiolucency is seen surrounding a left mandibular first molar, suggesting periapical abscess. _______________    IMPRESSION    No fracture or subluxation. Unusual calcific opacity narrowing the left C7-T1 foramen superiorly, suspect elongated facet osteophyte or less likely partially calcified synovial cyst. Consider follow-up dedicated MR cervical spine especially if left C8 radiculopathy symptoms. Findings suspicious for left mandibular first molar periapical abscess.      Right shoulder x-rays from 9/16/2020 were personally reviewed with the patient and demonstrated:  FINDINGS:    BONES: Intact and normally aligned. SOFT TISSUES: Unremarkable.    _______________    IMPRESSION    No significant abnormality.     Right upper extremity EMG from 11/25/2020 were personally reviewed with the patient and demonstrated:  NCV & EMG Findings:  All nerve conduction studies (as indicated in the following tables) were within normal limits.       All examined muscles (as indicated in the following table) showed no evidence of electrical instability.       INTERPRETATION  This was a normal nerve conduction and EMG study showing there to be no signs of neuropathy, myopathy, or radiculopathy in the nerves and muscles tested.      CLINICAL INTERPRETATION  Her electrodiagnostic findings do not appear to explain her right arm symptoms.     Written by Dawn Luis, as dictated by Vipin Oliva MD.  I, Dr. Vipin Oliva confirm that all documentation is accurate.

## 2021-05-10 ENCOUNTER — OFFICE VISIT (OUTPATIENT)
Dept: ORTHOPEDIC SURGERY | Age: 36
End: 2021-05-10
Payer: MEDICAID

## 2021-05-10 VITALS
OXYGEN SATURATION: 98 % | RESPIRATION RATE: 12 BRPM | HEART RATE: 70 BPM | WEIGHT: 137.8 LBS | TEMPERATURE: 97.7 F | SYSTOLIC BLOOD PRESSURE: 107 MMHG | BODY MASS INDEX: 26.01 KG/M2 | HEIGHT: 61 IN | DIASTOLIC BLOOD PRESSURE: 72 MMHG

## 2021-05-10 DIAGNOSIS — G89.29 CHRONIC RIGHT SHOULDER PAIN: ICD-10-CM

## 2021-05-10 DIAGNOSIS — M25.511 CHRONIC RIGHT SHOULDER PAIN: ICD-10-CM

## 2021-05-10 DIAGNOSIS — M79.18 MYOFASCIAL PAIN: ICD-10-CM

## 2021-05-10 DIAGNOSIS — M79.10 TRIGGER POINT: ICD-10-CM

## 2021-05-10 DIAGNOSIS — M62.838 MUSCLE SPASM: ICD-10-CM

## 2021-05-10 DIAGNOSIS — M25.521 RIGHT ELBOW PAIN: Primary | ICD-10-CM

## 2021-05-10 DIAGNOSIS — M19.011 OSTEOARTHRITIS OF RIGHT AC (ACROMIOCLAVICULAR) JOINT: ICD-10-CM

## 2021-05-10 PROCEDURE — 20552 NJX 1/MLT TRIGGER POINT 1/2: CPT | Performed by: PHYSICAL MEDICINE & REHABILITATION

## 2021-05-10 PROCEDURE — 99214 OFFICE O/P EST MOD 30 MIN: CPT | Performed by: PHYSICAL MEDICINE & REHABILITATION

## 2021-05-10 RX ORDER — PAROXETINE 10 MG/1
10 TABLET, FILM COATED ORAL DAILY
COMMUNITY
Start: 2021-04-26

## 2021-05-10 RX ORDER — PREDNISONE 10 MG/1
10 TABLET ORAL DAILY
COMMUNITY
Start: 2021-04-27

## 2021-05-10 RX ORDER — HYDROXYZINE PAMOATE 25 MG/1
1 CAPSULE ORAL DAILY
COMMUNITY
Start: 2021-04-26

## 2021-05-10 RX ORDER — SERTRALINE HYDROCHLORIDE 100 MG/1
1 TABLET, FILM COATED ORAL DAILY
COMMUNITY
Start: 2021-03-28

## 2021-05-10 RX ORDER — LIDOCAINE HYDROCHLORIDE 20 MG/ML
1 INJECTION, SOLUTION INFILTRATION; PERINEURAL ONCE
Status: COMPLETED | OUTPATIENT
Start: 2021-05-10 | End: 2021-05-10

## 2021-05-10 RX ORDER — BUPIVACAINE HYDROCHLORIDE 2.5 MG/ML
2.5 INJECTION, SOLUTION INFILTRATION; PERINEURAL ONCE
Status: COMPLETED | OUTPATIENT
Start: 2021-05-10 | End: 2021-05-10

## 2021-05-10 RX ORDER — BETAMETHASONE SODIUM PHOSPHATE AND BETAMETHASONE ACETATE 3; 3 MG/ML; MG/ML
6 INJECTION, SUSPENSION INTRA-ARTICULAR; INTRALESIONAL; INTRAMUSCULAR; SOFT TISSUE ONCE
Status: COMPLETED | OUTPATIENT
Start: 2021-05-10 | End: 2021-05-10

## 2021-05-10 RX ADMIN — BETAMETHASONE SODIUM PHOSPHATE AND BETAMETHASONE ACETATE 6 MG: 3; 3 INJECTION, SUSPENSION INTRA-ARTICULAR; INTRALESIONAL; INTRAMUSCULAR; SOFT TISSUE at 11:09

## 2021-05-10 RX ADMIN — BUPIVACAINE HYDROCHLORIDE 2.5 MG: 2.5 INJECTION, SOLUTION INFILTRATION; PERINEURAL at 11:10

## 2021-05-10 RX ADMIN — LIDOCAINE HYDROCHLORIDE 20 MG: 20 INJECTION, SOLUTION INFILTRATION; PERINEURAL at 11:11

## 2021-05-10 NOTE — LETTER
5/12/2021 Patient: Souleymane Nicole YOB: 1985 Date of Visit: 5/10/2021 Frankie Key MD 
[address] [place] [address] [address] [address] [address] Fax: [phone] Dear Frankie Key MD, Thank you for referring Ms. Renata Dias to [facility] for evaluation. My notes for this consultation are attached. If you have questions, please do not hesitate to call me. I look forward to following your patient along with you. Sincerely, Dudley Hugo MD

## 2021-05-10 NOTE — PATIENT INSTRUCTIONS
Neck Arthritis: Exercises Introduction Here are some examples of exercises for you to try. The exercises may be suggested for a condition or for rehabilitation. Start each exercise slowly. Ease off the exercises if you start to have pain. You will be told when to start these exercises and which ones will work best for you. How to do the exercises Neck stretches to the side 1. This stretch works best if you keep your shoulder down as you lean away from it. To help you remember to do this, start by relaxing your shoulders and lightly holding on to your thighs or your chair. 2. Tilt your head toward your shoulder and hold for 15 to 30 seconds. Let the weight of your head stretch your muscles. 3. Repeat 2 to 4 times toward each shoulder. Chin tuck 1. Lie on the floor with a rolled-up towel under your neck. Your head should be touching the floor. 2. Slowly bring your chin toward your chest. 
3. Hold for a count of 6, and then relax for up to 10 seconds. 4. Repeat 8 to 12 times. Active cervical rotation 1. Sit in a firm chair, or stand up straight. 2. Keeping your chin level, turn your head to the right, and hold for 15 to 30 seconds. 3. Turn your head to the left and hold for 15 to 30 seconds. 4. Repeat 2 to 4 times to each side. Shoulder blade squeeze 1. While standing, squeeze your shoulder blades together. 2. Do not raise your shoulders up as you are squeezing. 3. Hold for 6 seconds. 4. Repeat 8 to 12 times. Shoulder rolls 1. Sit comfortably with your feet shoulder-width apart. You can also do this exercise standing up. 2. Roll your shoulders up, then back, and then down in a smooth, circular motion. 3. Repeat 2 to 4 times. Follow-up care is a key part of your treatment and safety. Be sure to make and go to all appointments, and call your doctor if you are having problems. It's also a good idea to know your test results and keep a list of the medicines you take.  
Where can you learn more? Go to http://maxi-dominic.info/ Enter N649 in the search box to learn more about \"Neck Arthritis: Exercises. \" Current as of: November 16, 2020               Content Version: 12.8 © 7303-4528 [employer]. Care instructions adapted under license by [employer] (which disclaims liability or warranty for this information). If you have questions about a medical condition or this instruction, always ask your healthcare professional. [place] [address] any warranty or liability for your use of this information.

## 2021-09-02 NOTE — PROGRESS NOTES
Hematology/Oncology  Progress Note    Name: Nikolay Fernández  Date: 2020  : 1985    PCP: Dharmesh Sanches MD     Ms. Gerardo Petit is a 38 y.o.  female who was seen for erythrocytosis. Current therapy: Active surveillance; therapeutic phlebotomy will be provided if the hematocrit exceeds 45%. Subjective:     Mrs. Gerardo Petit is a 25-year-old -American woman who was previously evaluated for elevated RBC's to rule out polycythemia. I got a call yesterday from her primary care physician stating that her WBC count had recently increased to 19,000. She had been evaluated for influenza and that was ruled out. Additionally she had a test for coronavirus and this was negative as well. I was asked if I could assess the patient and an arrangement was made to have her come in today so that we could check up on her blood counts. Today she complains of feeling very tired and weak. She denies having any fever, shortness of breath, or generalized discomfort. Past medical history, family history, and social history: these were reviewed and remains unchanged.     Past Medical History:   Diagnosis Date    Anemia     Hemorrhoids     Joint pain     Muscle pain      Past Surgical History:   Procedure Laterality Date    FLEXIBLE SIGMOIDOSCOPY N/A 2019    SIGMOIDOSCOPY FLEXIBLE performed by Linwood Medina MD at [facility]    HX GYN      iud inserted and removed    HX HYSTERECTOMY      HX OTHER SURGICAL  Lap in     HX TUBAL LIGATION       Social History     Socioeconomic History    Marital status: UNKNOWN     Spouse name: Not on file    Number of children: Not on file    Years of education: Not on file    Highest education level: Not on file   Occupational History    Not on file   Social Needs    Financial resource strain: Not on file    Food insecurity     Worry: Not on file     Inability: Not on file    Transportation needs     Medical: Not on file     Non-medical: Not on file Recommended artificial tears to use: 1 drop 4x a day in both eyes. Tobacco Use    Smoking status: Never Smoker    Smokeless tobacco: Never Used   Substance and Sexual Activity    Alcohol use: No    Drug use: No    Sexual activity: Yes     Partners: Male   Lifestyle    Physical activity     Days per week: Not on file     Minutes per session: Not on file    Stress: Not on file   Relationships    Social connections     Talks on phone: Not on file     Gets together: Not on file     Attends Restorationist service: Not on file     Active member of club or organization: Not on file     Attends meetings of clubs or organizations: Not on file     Relationship status: Not on file    Intimate partner violence     Fear of current or ex partner: Not on file     Emotionally abused: Not on file     Physically abused: Not on file     Forced sexual activity: Not on file   Other Topics Concern    Not on file   Social History Narrative    Not on file     Family History   Problem Relation Age of Onset    Diabetes Mother     Hypertension Mother     Stroke Maternal Grandfather     Breast Cancer Paternal Aunt     Hypertension Father     Depression Father      Current Outpatient Medications   Medication Sig Dispense Refill    sertraline (ZOLOFT) 50 mg tablet Take 25 mg by mouth daily.  ergocalciferol (ERGOCALCIFEROL) 50,000 unit capsule Take 1 Cap by mouth every seven (7) days. 12 Cap 0    meloxicam (MOBIC) 15 mg tablet TAKE 1 TAB BY MOUTH DAILY AS NEEDED FOR PAIN. TAKE WITH FOOD  1    Cholecalciferol, Vitamin D3, (VITAMIN D3) 2,000 unit cap capsule Take  by mouth two (2) times a day.  oxycodone-acetaminophen (PERCOCET) 5-325 mg per tablet Take 1-2 tablets by mouth every six (6) hours as needed for Pain. 40 tablet 0    ibuprofen (MOTRIN) 600 mg tablet Take  by mouth. Indications: PAIN         Review of Systems  Constitutional: The patient has no acute distress or discomfort.   HEENT: The patient denies recent head trauma, eye pain, blurred vision,  hearing deficit, oropharyngeal mucosal pain or lesions, and the patient denies throat pain or discomfort. Lymphatics: The patient denies palpable peripheral lymphadenopathy. Hematologic: The patient denies having bruising, bleeding, or progressive fatigue. Respiratory: Patient denies having shortness of breath, cough, sputum production, fever, or dyspnea on exertion. Cardiovascular: The patient denies having leg pain, leg swelling, heart palpitations, chest permit, chest pain, or lightheadedness. The patient denies having dyspnea on exertion. Gastrointestinal: The patient denies having nausea, emesis, or diarrhea. The patient denies having any hematemesis or blood in the stool. Genitourinary: Patient denies having urinary urgency, frequency, or dysuria. The patient denies having blood in the urine. Psychological: The patient denies having symptoms of nervousness, anxiety, depression, or thoughts of harming himself some of this. Skin: Patient denies having skin rashes, skin, ulcerations, or unexplained itching or pruritus. Musculoskeletal: The patient denies having pain in the joints or bones. Objective:     Visit Vitals  BP 98/66   Pulse 88   Temp 99.5 °F (37.5 °C) (Oral)   Resp 16   Ht 5' 1\" (1.549 m)   LMP 10/06/2014   SpO2 99%   BMI 24.94 kg/m²     ECOG PS=0, pain= 0/10  Physical Exam:   Gen. Appearance: The patient is in no acute distress. Skin: There is no bruise or rash. HEENT: The exam is unremarkable. Neck: Supple without lymphadenopathy or thyromegaly. Lungs: Clear to auscultation and percussion; there are no wheezes or rhonchi. Heart: Regular rate and rhythm; there are no murmurs, gallops, or rubs. Abdomen: Bowel sounds are present and normal.  There is no guarding, tenderness, or hepatosplenomegaly. Extremities: There is no clubbing, cyanosis, or edema. Neurologic: There are no focal neurologic deficits. Lymphatics: There is no palpable peripheral lymphadenopathy.  Musculoskeletal: The patient has full range of motion at all joints. There is no evidence of joint deformity or effusions. There is no focal joint tenderness. Psychological/psychiatric: There is no clinical evidence of anxiety, depression, or melancholy. Lab data:      Results for orders placed or performed during the hospital encounter of 04/08/20   CBC WITH 3 PART DIFF     Status: Abnormal   Result Value Ref Range Status    WBC 11.3 4.5 - 13.0 K/uL Final    RBC 5.44 (H) 4.10 - 5.10 M/uL Final    HGB 13.1 12.0 - 16.0 g/dL Final    HCT 41.1 36 - 48 % Final    MCV 75.6 (L) 78 - 102 FL Final    MCH 24.1 (L) 25.0 - 35.0 PG Final    MCHC 31.9 31 - 37 g/dL Final    RDW 14.7 (H) 11.5 - 14.5 % Final    PLATELET 693 524 - 324 K/uL Final    NEUTROPHILS 54 40 - 70 % Final    MIXED CELLS 9 0.1 - 17 % Final    LYMPHOCYTES 37 14 - 44 % Final    ABS. NEUTROPHILS 6.0 1.8 - 9.5 K/UL Final    ABS. MIXED CELLS 1.1 0.0 - 2.3 K/uL Final    ABS. LYMPHOCYTES 4.2 1.1 - 5.9 K/UL Final     Comment: Test performed at [address] [place] [address] or Outpatient Infusion [facility] Location. Reviewed by Medical Director. DF AUTOMATED   Final           Assessment:     1. Neutrophilic leukocytosis    2. Erythrocytosis          Plan:     Neutrophilic Leukocytosis: CBC from today, 4/8/2020, shows that her WBC count is now back in the normal range at 11.3, the hemoglobin is 13.1 g/dL, hematocrit is 41.1%, and the platelet count is 303,445. At this time I will check an immunophenotyping profile to rule out any evidence of immunophenotypic aberrancy. We last did an immunophenotyping profile on 12/1/2007 and that test was negative. I will see her back in 2 weeks to review lab data. I suspect that she was experiencing a leukemoid reaction at the time that her WBC count was 19,000 versus a purely reactive process. Erythrocytosis: Total RBC;s from today was 5.44. Her hemoglobin and hematocrit are normal at 13.1 g/dL and 41.1% respectively. Her MCV is currently 75. 6. At this time an CMP, will be obtained. No tests will include an SPEP and the iron profile and ferritin levels. Return to clinic in 2 weeks. Orders Placed This Encounter    COMPLETE CBC & AUTO DIFF WBC    InHouse CBC ([employer])     Standing Status:   Future     Number of Occurrences:   1     Standing Expiration Date:   0/07/4389    METABOLIC PANEL, COMPREHENSIVE     Standing Status:   Future     Standing Expiration Date:   4/9/2021    PROTEIN ELECTROPHORESIS     Standing Status:   Future     Standing Expiration Date:   4/8/2021    IMMUNOPHENOTYPING PROFILE     Standing Status:   Future     Standing Expiration Date:   4/9/2021     Order Specific Question:   Specimen source     Answer:   Blood [2]     Order Specific Question:   [facility] Clinical Information     Answer:   N/A       Amena Bailey MD  4/8/2020       Please note: This document has been produced using voice recognition software. Unrecognized errors in transcription may be present.

## 2022-03-19 PROBLEM — E55.9 VITAMIN D DEFICIENCY: Status: ACTIVE | Noted: 2018-07-27

## 2022-03-19 PROBLEM — J20.9 ACUTE BRONCHITIS: Status: ACTIVE | Noted: 2020-04-01

## 2022-03-20 PROBLEM — D72.9 NEUTROPHILIC LEUKOCYTOSIS: Status: ACTIVE | Noted: 2017-12-01

## 2022-03-20 PROBLEM — D75.1 ERYTHROCYTOSIS: Status: ACTIVE | Noted: 2017-12-01

## 2022-04-07 ENCOUNTER — NEW PATIENT (OUTPATIENT)
Dept: URBAN - METROPOLITAN AREA CLINIC 1 | Facility: CLINIC | Age: 37
End: 2022-04-07

## 2022-04-07 DIAGNOSIS — H52.13: ICD-10-CM

## 2022-04-07 PROCEDURE — S0620 ROUTINE OPHTHALMOLOGICAL EXA: HCPCS

## 2022-04-07 PROCEDURE — 92310 CONTACT LENS FITTING OU: CPT

## 2022-04-07 ASSESSMENT — VISUAL ACUITY
OS_CC: J1+
OS_CC: 20/20
OD_CC: 20/20
OD_CC: J1+

## 2022-04-07 ASSESSMENT — KERATOMETRY
OS_AXISANGLE2_DEGREES: 102
OS_AXISANGLE_DEGREES: 012
OD_AXISANGLE2_DEGREES: 77
OD_K1POWER_DIOPTERS: 44.25
OD_K2POWER_DIOPTERS: 45.00
OS_K1POWER_DIOPTERS: 44.75
OS_K2POWER_DIOPTERS: 45.50
OD_AXISANGLE_DEGREES: 167

## 2022-04-15 ENCOUNTER — CONTACT LENSES/GLASSES VISIT (OUTPATIENT)
Dept: URBAN - METROPOLITAN AREA CLINIC 1 | Facility: CLINIC | Age: 37
End: 2022-04-15

## 2022-04-15 DIAGNOSIS — Z46.0: ICD-10-CM

## 2022-04-15 PROCEDURE — 92310-F CONTACT LENS FITTING FOLLOW UP

## 2022-04-15 ASSESSMENT — KERATOMETRY
OS_K1POWER_DIOPTERS: 44.75
OS_AXISANGLE_DEGREES: 012
OD_AXISANGLE_DEGREES: 167
OD_K1POWER_DIOPTERS: 44.25
OS_K2POWER_DIOPTERS: 45.50
OD_K2POWER_DIOPTERS: 45.00
OS_AXISANGLE2_DEGREES: 102
OD_AXISANGLE2_DEGREES: 77

## 2022-04-15 ASSESSMENT — VISUAL ACUITY: OS_CC: 20/20

## 2022-04-15 NOTE — PATIENT DISCUSSION
Good fit, good comfort. Va doing well per patient. Will recheck next week after eyelid symptoms have improved.

## 2022-04-22 ENCOUNTER — CONTACT LENSES/GLASSES VISIT (OUTPATIENT)
Dept: URBAN - METROPOLITAN AREA CLINIC 1 | Facility: CLINIC | Age: 37
End: 2022-04-22

## 2022-04-22 DIAGNOSIS — Z46.0: ICD-10-CM

## 2022-04-22 PROCEDURE — 92310-F CONTACT LENS FITTING FOLLOW UP

## 2022-04-22 ASSESSMENT — KERATOMETRY
OD_K1POWER_DIOPTERS: 44.25
OD_K2POWER_DIOPTERS: 45.00
OD_AXISANGLE_DEGREES: 167
OS_AXISANGLE_DEGREES: 012
OS_K2POWER_DIOPTERS: 45.50
OD_AXISANGLE2_DEGREES: 77
OS_K1POWER_DIOPTERS: 44.75
OS_AXISANGLE2_DEGREES: 102

## 2022-04-22 ASSESSMENT — VISUAL ACUITY
OD_CC: 20/20
OS_CC: 20/20

## 2022-04-22 NOTE — PATIENT DISCUSSION
Good fit, good comfort. VA doing well per patient ([facility]). Advised patient to help with dryness to use ATs BID-TID OU. Patient states not currently using ATs. CTL Rx finalized and given to patient.

## 2022-05-02 ENCOUNTER — OFFICE VISIT (OUTPATIENT)
Dept: ORTHOPEDIC SURGERY | Age: 37
End: 2022-05-02
Payer: COMMERCIAL

## 2022-05-02 VITALS
DIASTOLIC BLOOD PRESSURE: 60 MMHG | BODY MASS INDEX: 24.92 KG/M2 | HEIGHT: 61 IN | RESPIRATION RATE: 16 BRPM | HEART RATE: 76 BPM | WEIGHT: 132 LBS | SYSTOLIC BLOOD PRESSURE: 115 MMHG | OXYGEN SATURATION: 100 % | TEMPERATURE: 98.3 F

## 2022-05-02 DIAGNOSIS — M54.2 CERVICAL PAIN: ICD-10-CM

## 2022-05-02 DIAGNOSIS — M79.10 TRIGGER POINT: ICD-10-CM

## 2022-05-02 DIAGNOSIS — M62.838 MUSCLE SPASM: ICD-10-CM

## 2022-05-02 DIAGNOSIS — E55.9 VITAMIN D DEFICIENCY: ICD-10-CM

## 2022-05-02 DIAGNOSIS — M79.18 MYOFASCIAL PAIN: Primary | ICD-10-CM

## 2022-05-02 PROCEDURE — 20552 NJX 1/MLT TRIGGER POINT 1/2: CPT | Performed by: PHYSICAL MEDICINE & REHABILITATION

## 2022-05-02 PROCEDURE — 99214 OFFICE O/P EST MOD 30 MIN: CPT | Performed by: PHYSICAL MEDICINE & REHABILITATION

## 2022-05-02 RX ORDER — BETAMETHASONE SODIUM PHOSPHATE AND BETAMETHASONE ACETATE 3; 3 MG/ML; MG/ML
6 INJECTION, SUSPENSION INTRA-ARTICULAR; INTRALESIONAL; INTRAMUSCULAR; SOFT TISSUE ONCE
Status: COMPLETED | OUTPATIENT
Start: 2022-05-02 | End: 2022-05-02

## 2022-05-02 RX ORDER — TIZANIDINE 4 MG/1
TABLET ORAL
Qty: 60 TABLET | Refills: 1 | Status: SHIPPED | OUTPATIENT
Start: 2022-05-02

## 2022-05-02 RX ORDER — NAPROXEN 500 MG/1
500 TABLET ORAL 2 TIMES DAILY WITH MEALS
Qty: 60 TABLET | Refills: 4 | Status: SHIPPED | OUTPATIENT
Start: 2022-05-02

## 2022-05-02 RX ORDER — METHYLPREDNISOLONE 4 MG/1
TABLET ORAL
Qty: 1 DOSE PACK | Refills: 0 | Status: CANCELLED | OUTPATIENT
Start: 2022-05-02

## 2022-05-02 RX ORDER — LIDOCAINE HYDROCHLORIDE 20 MG/ML
1 INJECTION, SOLUTION INFILTRATION; PERINEURAL ONCE
Status: COMPLETED | OUTPATIENT
Start: 2022-05-02 | End: 2022-05-02

## 2022-05-02 RX ORDER — BUPIVACAINE HYDROCHLORIDE 2.5 MG/ML
2.5 INJECTION, SOLUTION INFILTRATION; PERINEURAL ONCE
Status: COMPLETED | OUTPATIENT
Start: 2022-05-02 | End: 2022-05-02

## 2022-05-02 RX ADMIN — LIDOCAINE HYDROCHLORIDE 20 MG: 20 INJECTION, SOLUTION INFILTRATION; PERINEURAL at 15:41

## 2022-05-02 RX ADMIN — BUPIVACAINE HYDROCHLORIDE 2.5 MG: 2.5 INJECTION, SOLUTION INFILTRATION; PERINEURAL at 15:40

## 2022-05-02 RX ADMIN — BETAMETHASONE SODIUM PHOSPHATE AND BETAMETHASONE ACETATE 6 MG: 3; 3 INJECTION, SUSPENSION INTRA-ARTICULAR; INTRALESIONAL; INTRAMUSCULAR; SOFT TISSUE at 15:38

## 2022-05-02 NOTE — PROGRESS NOTES
Chief Complaint   Patient presents with    Follow-up       Pt preferred language for health care discussion is english. Is someone accompanying this pt? no    Is the patient using any DME equipment during [address]? no    Depression Screening:  3 most recent [facility] ([place], [place]) Screens 5/10/2021 12/10/2020 11/10/2020 6/18/2020 4/8/2020 12/19/2019 6/13/2019   PHQ Not Done - Patient Decline Patient Decline - - - -   Little interest or pleasure in doing things Not at all - - Not at all Not at all Not at all Not at all   Feeling down, depressed, irritable, or hopeless Not at all - - Not at all Not at all Not at all Not at all   Total Score PHQ 2 0 - - 0 0 0 0       Learning Assessment:  Learning Assessment 6/13/2019   PRIMARY LEARNER Patient   BARRIERS PRIMARY LEARNER NONE   CO-LEARNER CAREGIVER No   PRIMARY LANGUAGE ENGLISH   LEARNER PREFERENCE PRIMARY READING   ANSWERED BY self   RELATIONSHIP SELF         Health Maintenance reviewed and discussed per provider. Yes        Advance Directive:  1. Do you have an advance directive in place? Patient Reply:no    2. If not, would you like material regarding how to put one in place? Patient Reply: no    Coordination of Care:  1. Have you been to the ER, urgent care clinic since your last visit? Hospitalized since your last visit? no    2. Have you seen or consulted any other health care providers outside of the [address] [place] [address] since your last visit? Include any pap smears or colon screening.  no

## 2022-05-02 NOTE — PATIENT INSTRUCTIONS
Neck Arthritis: Exercises  Introduction  Here are some examples of exercises for you to try. The exercises may be suggested for a condition or for rehabilitation. Start each exercise slowly. Ease off the exercises if you start to have pain. You will be told when to start these exercises and which ones will work best for you. How to do the exercises  Neck stretches to the side    1. This stretch works best if you keep your shoulder down as you lean away from it. To help you remember to do this, start by relaxing your shoulders and lightly holding on to your thighs or your chair. 2. Tilt your head toward your shoulder and hold for 15 to 30 seconds. Let the weight of your head stretch your muscles. 3. Repeat 2 to 4 times toward each shoulder. Chin tuck    1. Lie on the floor with a rolled-up towel under your neck. Your head should be touching the floor. 2. Slowly bring your chin toward your chest.  3. Hold for a count of 6, and then relax for up to 10 seconds. 4. Repeat 8 to 12 times. Active cervical rotation    1. Sit in a firm chair, or stand up straight. 2. Keeping your chin level, turn your head to the right, and hold for 15 to 30 seconds. 3. Turn your head to the left and hold for 15 to 30 seconds. 4. Repeat 2 to 4 times to each side. Shoulder blade squeeze    1. While standing, squeeze your shoulder blades together. 2. Do not raise your shoulders up as you are squeezing. 3. Hold for 6 seconds. 4. Repeat 8 to 12 times. Shoulder rolls    1. Sit comfortably with your feet shoulder-width apart. You can also do this exercise standing up. 2. Roll your shoulders up, then back, and then down in a smooth, circular motion. 3. Repeat 2 to 4 times. Follow-up care is a key part of your treatment and safety. Be sure to make and go to all appointments, and call your doctor if you are having problems. It's also a good idea to know your test results and keep a list of the medicines you take.   Where can you learn more? Go to http://www.gray.com/  Enter L055 in the search box to learn more about \"Neck Arthritis: Exercises. \"  Current as of: July 1, 2021               Content Version: 13.2  © 2006-2022 [employer]. Care instructions adapted under license by [employer] (which disclaims liability or warranty for this information). If you have questions about a medical condition or this instruction, always ask your healthcare professional. [address] any warranty or liability for your use of this information. Shoulder Arthritis: Exercises  Introduction  Here are some examples of exercises for you to try. The exercises may be suggested for a condition or for rehabilitation. Start each exercise slowly. Ease off the exercises if you start to have pain. You will be told when to start these exercises and which ones will work best for you. How to do the exercises  Shoulder flexion (lying down)    To make a wand for this exercise, use a piece of PVC pipe or a broom handle with the broom removed. Make the wand about a foot wider than your shoulders. 1. Lie on your back, holding a wand with both hands. Your palms should face down as you hold the wand. 2. Keeping your elbows straight, slowly raise your arms over your head. Raise them until you feel a stretch in your shoulders, upper back, and chest.  3. Hold for 15 to 30 seconds. 4. Repeat 2 to 4 times. Shoulder rotation (lying down)    To make a wand for this exercise, use a piece of PVC pipe or a broom handle with the broom removed. Make the wand about a foot wider than your shoulders. 1. Lie on your back. Hold a wand with both hands with your elbows bent and palms up. 2. Keep your elbows close to your body, and move the wand across your body toward the sore arm. 3. Hold for 8 to 12 seconds. 4. Repeat 2 to 4 times. Shoulder internal rotation with towel    1.  Hold a towel above and behind your head with the arm that is not sore. 2. With your sore arm, reach behind your back and grasp the towel. 3. With the arm above your head, pull the towel upward. Do this until you feel a stretch on the front and outside of your sore shoulder. 4. Hold 15 to 30 seconds. 5. Repeat 2 to 4 times. Shoulder blade squeeze    1. Stand with your arms at your sides, and squeeze your shoulder blades together. Do not raise your shoulders up as you squeeze. 2. Hold 6 seconds. 3. Repeat 8 to 12 times. Resisted rows    For this exercise, you will need elastic exercise material, such as surgical tubing or Thera-Band. 1. Put the band around a solid object at about waist level. (A bedpost will work well.) Each hand should hold an end of the band. 2. With your elbows at your sides and bent to 90 degrees, pull the band back. Your shoulder blades should move toward each other. Return to the starting position. 3. Repeat 8 to 12 times. External rotator strengthening exercise    1. Start by tying a piece of elastic exercise material to a doorknob. You can use surgical tubing or Thera-Band. (You may also hold one end of the band in each hand.)  2. Stand or sit with your shoulder relaxed and your elbow bent 90 degrees. Your upper arm should rest comfortably against your side. Squeeze a rolled towel between your elbow and your body for comfort. This will help keep your arm at your side. 3. Hold one end of the elastic band with the hand of the painful arm. 4. Start with your forearm across your belly. Slowly rotate the forearm out away from your body. Keep your elbow and upper arm tucked against the towel roll or the side of your body until you begin to feel tightness in your shoulder. Slowly move your arm back to where you started. 5. Repeat 8 to 12 times. Internal rotator strengthening exercise    1. Start by tying a piece of elastic exercise material to a doorknob. You can use surgical tubing or Thera-Band.   2. Stand or sit with your shoulder relaxed and your elbow bent 90 degrees. Your upper arm should rest comfortably against your side. Squeeze a rolled towel between your elbow and your body for comfort. This will help keep your arm at your side. 3. Hold one end of the elastic band in the hand of the painful arm. 4. Slowly rotate your forearm toward your body until it touches your belly. Slowly move it back to where you started. 5. Keep your elbow and upper arm firmly tucked against the towel roll or at your side. 6. Repeat 8 to 12 times. Pendulum swing    If you have pain in your back, do not do this exercise. 1. Hold on to a table or the back of a chair with your good arm. Then bend forward a little and let your sore arm hang straight down. This exercise does not use the arm muscles. Rather, use your legs and your hips to create movement that makes your arm swing freely. 2. Use the movement from your hips and legs to guide the slightly swinging arm back and forth like a pendulum (or elephant trunk). Then guide it in circles that start small (about the size of a dinner plate). Make the circles a bit larger each day, as your pain allows. 3. Do this exercise for 5 minutes, 5 to 7 times each day. 4. As you have less pain, try bending over a little farther to do this exercise. This will increase the amount of movement at your shoulder. Follow-up care is a key part of your treatment and safety. Be sure to make and go to all appointments, and call your doctor if you are having problems. It's also a good idea to know your test results and keep a list of the medicines you take. Where can you learn more? Go to http://www.gray.com/  Enter H562 in the search box to learn more about \"Shoulder Arthritis: Exercises. \"  Current as of: July 1, 2021               Content Version: 13.2  © 1543-0680 [employer].    Care instructions adapted under license by [employer] (which disclaims liability or warranty for this information). If you have questions about a medical condition or this instruction, always ask your healthcare professional. [place] [address] any warranty or liability for your use of this information.

## 2022-05-02 NOTE — PROGRESS NOTES
[facility]  [address] [place] [address], [place] [address]  Phone: [phone]  Fax: [phone]    Pt's YOB: 1985    ASSESSMENT   Diagnoses and all orders for this visit:    1. Myofascial pain  -     naproxen (NAPROSYN) 500 mg tablet; Take 1 Tablet by mouth two (2) times daily (with meals). 2. Muscle spasm  -     tiZANidine (ZANAFLEX) 4 mg tablet; Take 1 tab by mouth BID-TID as needed for muscle spasm. 3. Trigger point  -     betamethasone (CELESTONE) injection 6 mg  -     bupivacaine HCl (MARCAINE) 0.25 % (2.5 mg/mL) injection 2.5 mg  -     lidocaine (XYLOCAINE) 20 mg/mL (2 %) injection 20 mg  -     INJECT TRIGGER POINT, 1 OR 2    4. Cervical pain  -     naproxen (NAPROSYN) 500 mg tablet; Take 1 Tablet by mouth two (2) times daily (with meals). 5. Vitamin D deficiency         IMPRESSION AND PLAN:  Clif Son is a 42 y.o. right hand dominant female with history of cervical pain. She complains of cervical pain and stiffness x 2 months. Pt is taking naproxen 500 mg 1 tab BID as needed with relief and uses Voltaren gel as needed. 1) Pt was given information on cervical and shoulder arthritis exercises. 2) Pt was prescribed Zanaflex 4 mg 1 tab BID-TID as needed for muscle spasm. 3) Pt received refills of Naprosyn 500 mg.   4) Trigger point injections were performed at the site of maximal tenderness using 1% plain Lidocaine and Celestone. This was well tolerated, and followed by 40% relief of pain. Pt was advised to avoid using heat on the injection sites for 48 hours and to stretch the surrounding muscles. 5) Pt was given information on how to use a TheraCane. 6) Pt was advised about safe dosing of naproxen (no more than 500 mg 1 tab BID). 7) Pt was advised to discuss vitamin D3 and magnesium supplementation and a daily multivitamin with her PCP. 8) Ms. Vane Sepulveda has a reminder for a \"due or due soon\" health maintenance.  I have asked that she contact her primary care provider, Yuval Alcazar MD, for follow-up on this health maintenance. 9)  demonstrated consistency with prescribing. Follow-up and Dispositions    · Return if symptoms worsen or fail to improve. HISTORY OF PRESENT ILLNESS:  Dori Sanches is a 42 y.o. right hand dominant female with history of cervical pain and presents to the office today for follow up. Pt complains of cervical pain and stiffness x 2 months. She denies any pain or weakness in the upper extremities or balance impairment but admits to difficulty with sleep. 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 She notes that she was previously enrolled in cervical physical therapy at the [facility] and underwent heat therapy, exercises, and traction with benefit. Pt is taking naproxen 500 mg 1 tab BID as needed with relief, supplements with vitamin D 50,000 international units weekly, and uses Voltaren gel as needed. Labs from 03/03/2022 were reviewed and demonstrated a 25-OH-vitamin D level of 22.0 ng/dL, a mean cell volume of 77 fL, hemoglobin level of 12.9 g/dL, a hematocrit of 42.8%, a TSH level of 1.01, a creatinine level of 0.8 mg/dL and an eGFR of >60. Pt denies any thalassemia or other congenital red blood cell abnormalities. She confirms that she previously took Robaxin but does not recall if it provided significant relief. Pt notes relief with previous trigger point injections. She also notes that she did not follow up with orthopedics regarding her elbow pain due to improvement in symptoms. Pt at this time desires to proceed with medication evaluation and trigger point injections.     Pain Scale: 5/10    PCP: Yuval Alcazar MD     Past Medical History:   Diagnosis Date    Anemia     Hemorrhoids     Joint pain     Muscle pain         Social History     Socioeconomic History    Marital status: SINGLE     Spouse name: Not on file    Number of children: Not on file    Years of education: Not on file    Highest education level: Not on file   Occupational History    Not on file   Tobacco Use    Smoking status: Never Smoker    Smokeless tobacco: Never Used   Substance and Sexual Activity    Alcohol use: No    Drug use: No    Sexual activity: Yes     Partners: Male   Other Topics Concern    Not on file   Social History Narrative    Not on file     Social Determinants of Health     Financial Resource Strain:     Difficulty of Paying Living Expenses: Not on file   Food Insecurity:     Worried About Running Out of Food in the Last Year: Not on file    Deandre of Food in the Last Year: Not on file   Transportation Needs:     Lack of Transportation (Medical): Not on file    Lack of Transportation (Non-Medical): Not on file   Physical Activity:     Days of Exercise per Week: Not on file    Minutes of Exercise per Session: Not on file   Stress:     Feeling of Stress : Not on file   Social Connections:     Frequency of Communication with Friends and Family: Not on file    Frequency of Social Gatherings with Friends and Family: Not on file    Attends Shinto Services: Not on file    Active Member of [address] [place] [address] [employer] or Organizations: Not on file    Attends Club or Organization Meetings: Not on file    Marital Status: Not on file   Intimate Partner Violence:     Fear of Current or Ex-Partner: Not on file    Emotionally Abused: Not on file    Physically Abused: Not on file    Sexually Abused: Not on file   Housing Stability:     Unable to Pay for Housing in the Last Year: Not on file    Number of [place] in the Last Year: Not on file    Unstable Housing in the Last Year: Not on file       Current Outpatient Medications   Medication Sig Dispense Refill    naproxen (NAPROSYN) 500 mg tablet Take 1 Tablet by mouth two (2) times daily (with meals).  60 Tablet 4    tiZANidine (ZANAFLEX) 4 mg tablet Take 1 tab by mouth BID-TID as needed for muscle spasm. 60 Tablet 1    hydrOXYzine pamoate (VISTARIL) 25 mg capsule Take 1 Cap by mouth daily.  diclofenac (Voltaren) 1 % gel Apply 4 grams to affected joint up to 4 times per day, maximum 16 grams per joint per day. Dispense 5 100 gram tubes 500 g 1    naproxen (NAPROSYN) 500 mg tablet Take 1 Tab by mouth two (2) times daily (with meals). 60 Tab 1    albuterol (PROVENTIL HFA, VENTOLIN HFA, PROAIR HFA) 90 mcg/actuation inhaler PLEASE SEE ATTACHED FOR DETAILED DIRECTIONS      Symbicort 80-4.5 mcg/actuation HFAA INHALE 1 PUFF BY MOUTH TWICE A DAY RINSE MOUTH AFTER USE      montelukast (SINGULAIR) 10 mg tablet TAKE 1 TABLET BY MOUTH EVERYDAY AT BEDTIME      ergocalciferol (ERGOCALCIFEROL) 50,000 unit capsule Take 1 Cap by mouth every seven (7) days. 12 Cap 0    PARoxetine (PAXIL) 10 mg tablet Take 10 mg by mouth daily. (Patient not taking: Reported on 5/2/2022)      predniSONE (DELTASONE) 10 mg tablet Take 10 mg by mouth daily. (Patient not taking: Reported on 5/2/2022)      sertraline (ZOLOFT) 100 mg tablet Take 1 Tab by mouth daily. (Patient not taking: Reported on 5/2/2022)      methocarbamoL (ROBAXIN) 750 mg tablet Take 750 mg by mouth every four (4) hours as needed. (Patient not taking: Reported on 5/2/2022)      Cholecalciferol, Vitamin D3, (VITAMIN D3) 2,000 unit cap capsule Take 2,000 Units by mouth two (2) times a day. (Patient not taking: Reported on 5/2/2022)         Allergies   Allergen Reactions    Sertraline Unknown (comments)     **PT DENIES ALLERGY**         REVIEW OF SYSTEMS    Constitutional: Negative for fever, chills, or weight change. Respiratory: Negative for cough or shortness of breath. Cardiovascular: Negative for chest pain or palpitations. Gastrointestinal: Negative for acid reflux, change in bowel habits, or constipation. Genitourinary: Negative for dysuria and flank pain.    Musculoskeletal: Positive for cervical pain.  Skin: Negative for rash. Neurological: Negative for headaches, dizziness, or numbness. Endo/Heme/Allergies: Negative for increased bruising. Psychiatric/Behavioral: Positive for difficulty with sleep. As per HPI    PHYSICAL EXAMINATION  Visit Vitals  /60 (BP 1 Location: Right arm, BP Patient Position: Sitting, BP Cuff Size: Adult)   Pulse 76   Temp 98.3 °F (36.8 °C) (Tympanic)   Resp 16   Ht 5' 1\" (1.549 m)   Wt 132 lb (59.9 kg)   LMP 10/06/2014   SpO2 100%   BMI 24.94 kg/m²       Constitutional: Awake, alert, and in no acute distress. Neurological:  Sensation to light touch is intact. Skin: warm, dry, and intact. Musculoskeletal: Good range of motion in the cervical spine on all planes. Good range of motion in the bilateral shoulders. Stiffness in the right shoulder with passive ranging. Tightness and tenderness to palpation across the bilateral trapezius, R>L. No pain with extension, axial loading, or forward flexion. No pain with internal or external rotation of her hips. Negative straight leg raise bilaterally. Biceps  Triceps Deltoids Wrist Ext Wrist Flex Hand Intrin   Right +4/5 +4/5 +4/5 +4/5 +4/5 +4/5   Left +4/5 +4/5 +4/5 +4/5 +4/5 +4/5     PROCEDURES:    I administered 3 trigger point injections to the left upper trapezius and 1 to the right upper trapezius using 1 cc Marcaine, 1 cc Lidocaine, and 1 cc Celestone. Pre-injection pain level was 5/10 and post-injection pain level was 3/10. Chart reviewed for the following:   Constantine Westfall MD, have reviewed the History, Physical and updated the Allergic reactions for [facility].      TIME OUT performed immediately prior to start of procedure:  I, Obed Tobin MD, have performed the following reviews on [facility] prior to the start of the procedure:            * Patient was identified by name and date of birth   * Agreement on procedure being performed was verified  * Risks and Benefits explained to the patient  * Procedure site verified and marked as necessary  * Patient was positioned for comfort  * Consent was obtained     Time: 3:05 PM     Date of procedure: 05/02/2022    Procedure performed by: Hardik Bose MD    Ms. Manrique tolerated the procedure well with no complications. IMAGING:    Right shoulder MRI from 1/23/2021 was personally reviewed with the patient and demonstrated:          Results from Orders Only encounter on 01/11/21   MRI SHOULDER RT WO CONT     Narrative Procedure: MRI SHOULDER RT WO CONT     Indications: persistent right shoulder pain and limited range of motion despite  physical therapy and NSAID's; assess for labral or rotator cuff tear. Chronic  pain, right arm weakness, myofascial pain and muscle spasm.     Technique: Multi-sequence, multiplanar MR imaging performed through the right  shoulder:     Comparisons: None     Findings:     Joints/soft tissues: Mild hypertrophic spurring of the acromioclavicular joint  with trace inflammation. Type II acromion. Slight lateral downsloping. No  substantive bursitis. No shoulder joint effusion or loose body.     Labrum: Intact     Articular cartilage: Intact     Rotator cuff-   Supraspinatus: Mild distal tendinosis, no tear. Infraspinatus: Unremarkable  Subscapularis: Unremarkable  Teres Minor: Unremarkable     Biceps: Intact     Other Structures: Unremarkable           Impression :     1. Essentially unremarkable right shoulder MRI  -Minor supraspinatus tendinosis  -Mild AC joint arthritis  -Slight lateral downsloping of the acromion could predispose to external  impingement               Right elbow 3V x-rays from 1/23/2021 were personally reviewed with the patient and demonstrated:          Results from Orders Only encounter on 01/04/21   XR ELBOW RT MIN 3 V     Narrative  Right elbow AP, lateral and oblique views     HISTORY: Right elbow pain     COMPARISON: None.     FINDINGS: The bony structures about the elbow are intact. There is no evidence  of acute fracture or dislocation identified. No joint effusion identified.        Impression Negative for acute injury.     Thank you for your referral.          Cervical spine MRI from 10/1/2020 was personally reviewed with the patient and demonstrated:  FINDINGS:     Postoperative changes: None. Alignment: Straightening of upper cervical lordotic curvature. .    Vertebral body heights: Normal.    Marrow signal: Normal.    Cord: Normal.    Cerebellar tonsils: No Chiari 1 malformation. Soft tissues: Normal.     Correlation of axial and sagittal data through the disc levels:    -C2-3: No significant disc pathology. Facet joints appear normal..No spinal canal or foraminal stenosis. -C3-4: No significant disc pathology. Facet joints appear normal..No spinal canal or foraminal stenosis. -C4-5: No significant disc pathology. Facet joints appear normal..No spinal canal or foraminal stenosis. -C5-6: Disc height is mildly diminished with slight disc bulging. Facet joints appear normal.. No spinal canal or foraminal stenosis. -C6-7: No significant disc pathology. Facet joints appear normal..No spinal canal or foraminal stenosis. -C7-T1: No significant disc pathology. Facet joints appear normal.. The small ossified body projecting in the C7-T1 superior left neural foramen on the prior radiographs is difficult to visualize on the MR images. It is possibly related to the facet on series 3 image 4. No significant spinal canal or foraminal stenosis. IMPRESSION  No large disc herniation, cord compression or cord signal abnormality. No foraminal stenosis or nerve root compression seen.  Previously reported ossified density in the C7-T1 left neural foramen difficult to visualize as discussed above but of doubtful clinical significance given the patient's reported right side arm pain and weakness.     Cervical spine x-rays from 9/18/2020 were personally reviewed with the patient and demonstrated:  FINDINGS:    VERTEBRAE AND ALIGNMENT: No fracture or subluxation is seen. FACET JOINTS AND FORAMINA: Calcific opacity is seen extending from the left C7-T1 facet joint with moderate narrowing of the left foramen as seen on the oblique view    PARASPINOUS SOFT TISSUES: Unremarkable. ADDITIONAL: Radiolucency is seen surrounding a left mandibular first molar, suggesting periapical abscess. _______________    IMPRESSION    No fracture or subluxation. Unusual calcific opacity narrowing the left C7-T1 foramen superiorly, suspect elongated facet osteophyte or less likely partially calcified synovial cyst. Consider follow-up dedicated MR cervical spine especially if left C8 radiculopathy symptoms. Findings suspicious for left mandibular first molar periapical abscess.      Right shoulder x-rays from 9/16/2020 were personally reviewed with the patient and demonstrated:  FINDINGS:    BONES: Intact and normally aligned. SOFT TISSUES: Unremarkable.    _______________    IMPRESSION    No significant abnormality. Written by Fabrizio Valentine, as dictated by Ewa Erwin MD.  I, Dr. Ewa Erwin confirm that all documentation is accurate.

## 2023-04-18 ENCOUNTER — OFFICE VISIT (OUTPATIENT)
Age: 38
End: 2023-04-18

## 2023-04-18 VITALS — HEIGHT: 61 IN | RESPIRATION RATE: 20 BRPM | BODY MASS INDEX: 24.94 KG/M2

## 2023-04-18 DIAGNOSIS — M77.11 RIGHT LATERAL EPICONDYLITIS: Primary | ICD-10-CM

## 2023-04-18 PROBLEM — F41.9 ANXIETY: Status: ACTIVE | Noted: 2019-01-16

## 2023-04-18 PROBLEM — D56.3 ALPHA THALASSAEMIA MINOR: Status: ACTIVE | Noted: 2023-04-18

## 2023-04-18 NOTE — PROGRESS NOTES
[facility] AMBULATORY OFFICE NOTE      Patient: Reyna Rios                MRN: [id number]       SSN: [id number]  YOB: 1985        AGE: 34 y.o. SEX: female  Body mass index is 24.94 kg/m². PCP: Robe Curtis MD  04/18/23    CHIEF COMPLAINT: Right elbow pain    HPI: Reyna Rios is a 34 y.o. female patient who complains of right elbow pain for approximate 1 year. She denies any specific injury or trauma to the right elbow patient says the pain is worse when bone. But she also has pain throughout the day. She not had any treatment for this at this point time. Past Medical History:   Diagnosis Date    Anemia     Hemorrhoids     Joint pain     Muscle pain        Family History   Problem Relation Age of Onset    Hypertension Mother     Hypertension Father     Diabetes Mother     Depression Father     Breast Cancer Paternal Aunt     Stroke Maternal Grandfather        Current Outpatient Medications   Medication Sig Dispense Refill    diclofenac sodium (VOLTAREN) 1 % GEL Apply 2 g topically 4 times daily 100 g 2    albuterol sulfate HFA (PROVENTIL;VENTOLIN;PROAIR) 108 (90 Base) MCG/ACT inhaler PLEASE SEE ATTACHED FOR DETAILED DIRECTIONS      budesonide-formoterol (SYMBICORT) 80-4.5 MCG/ACT AERO INHALE 1 PUFF BY MOUTH TWICE A DAY RINSE MOUTH AFTER USE      Cholecalciferol 50 MCG (2000 UT) CAPS Take 2,000 Units by mouth 2 times daily      diclofenac sodium (VOLTAREN) 1 % GEL Apply 4 grams to affected joint up to 4 times per day, maximum 16 grams per joint per day.  Dispense 5 100 gram tubes      ergocalciferol (ERGOCALCIFEROL) 1.25 MG (84880 UT) capsule Take 50,000 Units by mouth every 7 days      hydrOXYzine pamoate (VISTARIL) 25 MG capsule Take 1 capsule by mouth daily      methocarbamol (ROBAXIN) 750 MG tablet Take 750 mg by mouth every 4 hours as needed      montelukast (SINGULAIR) 10 MG tablet TAKE 1 TABLET BY MOUTH EVERYDAY AT BEDTIME

## (undated) DEVICE — Device

## (undated) DEVICE — CATH IV SAFE STR 22GX1IN BLU -- PROTECTIV PLUS